# Patient Record
Sex: MALE | Race: WHITE | NOT HISPANIC OR LATINO | Employment: OTHER | URBAN - NONMETROPOLITAN AREA
[De-identification: names, ages, dates, MRNs, and addresses within clinical notes are randomized per-mention and may not be internally consistent; named-entity substitution may affect disease eponyms.]

---

## 2021-02-17 DIAGNOSIS — I10 ESSENTIAL HYPERTENSION, BENIGN: Primary | ICD-10-CM

## 2021-02-17 RX ORDER — CHLORTHALIDONE 25 MG/1
25 TABLET ORAL DAILY
Qty: 90 TABLET | Refills: 3 | Status: SHIPPED | OUTPATIENT
Start: 2021-02-17 | End: 2021-10-18 | Stop reason: SDUPTHER

## 2021-02-17 RX ORDER — HYDRALAZINE HYDROCHLORIDE 50 MG/1
50 TABLET, FILM COATED ORAL 2 TIMES DAILY
Qty: 180 TABLET | Refills: 3 | Status: SHIPPED | OUTPATIENT
Start: 2021-02-17 | End: 2021-04-27 | Stop reason: SDUPTHER

## 2021-04-27 ENCOUNTER — OFFICE VISIT (OUTPATIENT)
Dept: CARDIOLOGY | Facility: CLINIC | Age: 81
End: 2021-04-27

## 2021-04-27 VITALS
BODY MASS INDEX: 30.8 KG/M2 | HEIGHT: 71 IN | RESPIRATION RATE: 12 BRPM | DIASTOLIC BLOOD PRESSURE: 63 MMHG | SYSTOLIC BLOOD PRESSURE: 123 MMHG | TEMPERATURE: 97.8 F | WEIGHT: 220 LBS | OXYGEN SATURATION: 99 % | HEART RATE: 70 BPM

## 2021-04-27 DIAGNOSIS — I25.10 CORONARY ARTERY DISEASE INVOLVING NATIVE CORONARY ARTERY OF NATIVE HEART WITHOUT ANGINA PECTORIS: Primary | ICD-10-CM

## 2021-04-27 DIAGNOSIS — I48.20 ATRIAL FIBRILLATION, CHRONIC (HCC): ICD-10-CM

## 2021-04-27 DIAGNOSIS — E78.5 HYPERLIPIDEMIA LDL GOAL <70: ICD-10-CM

## 2021-04-27 DIAGNOSIS — Z95.0 PRESENCE OF CARDIAC PACEMAKER: ICD-10-CM

## 2021-04-27 DIAGNOSIS — I10 ESSENTIAL HYPERTENSION: ICD-10-CM

## 2021-04-27 PROCEDURE — 99443 PR PHYS/QHP TELEPHONE EVALUATION 21-30 MIN: CPT | Performed by: INTERNAL MEDICINE

## 2021-04-27 RX ORDER — HYDRALAZINE HYDROCHLORIDE 50 MG/1
50 TABLET, FILM COATED ORAL 2 TIMES DAILY
Qty: 180 TABLET | Refills: 3 | Status: SHIPPED | OUTPATIENT
Start: 2021-04-27 | End: 2021-10-18 | Stop reason: SDUPTHER

## 2021-04-27 NOTE — PROGRESS NOTES
[facility] [place]  [facility] CARDIOLOGY  [address] [place] [address]  Dept: [phone]  Dept Fax: [phone]    Reg Guerra  1940    Televisit Note    You have chosen to receive care through a telephone visit. Do you consent to use a telephone visit for your medical care today? Yes    History of Present Illness:  Reg Guerra is a 80 y.o. male who presents via phone for Follow-up. CAD- He denies any chest pain, SOB, no edema, he has CABG in 2008 and has had stress nuclear in 2017 and also 2020 with abnormal reversible defect inferior and lateral,  He is on ASA.,he has stop the Coreg few weeks ago,     The following portions of the patient's history were reviewed and updated as appropriate: allergies, current medications, past family history, past medical history, past social history, past surgical history and problem list.    This visit was scheduled as a phone visit to comply with patient safety concerns in accordance with CDC recommendations.  Time spent in discussion with the patient was 30 minutes.     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 (CMS/HCC)    • Benign prostate hyperplasia    • Bilateral cataracts    • BMI 28.0-28.9,adult    • Chronic atrial fibrillation (CMS/HCC)    • Chronic gout, unspecified, without tophus (tophi)    • Chronic gouty arthritis    • Chronic low back pain    • Diabetes (CMS/HCC)    • DJD (degenerative joint disease)    • Dyslipidemia    • Essential hypertension    • Gastroesophageal reflux disease without  "esophagitis    • Hiatal hernia    • History of rheumatic fever as a child    • Hyperlipidemia    • Knee injury    • Mixed hyperlipidemia    • Obesity    • Peptic ulcer disease    • Presence of cardiac pacemaker        Past Surgical History:   Procedure Laterality Date   • APPENDECTOMY  1957   • CARDIAC PACEMAKER PLACEMENT      VVI   • KIDNEY STONE SURGERY      LITHOTRIPSY   • KNEE SURGERY  1960   • MANDIBLE FRACTURE SURGERY  2009    KICKED BY HORSE   • NISSEN FUNDOPLICATION     • TURP / TRANSURETHRAL INCISION / DRAINAGE PROSTATE     • VENTRAL HERNIA REPAIR         Family History   Problem Relation Age of Onset   • Pancreatic cancer Father    • Heart attack Sister    • Diabetes Sister    • Hypertension Sister         Social History     Socioeconomic History   • Marital status:      Spouse name: Not on file   • Number of children: Not on file   • Years of education: Not on file   • Highest education level: Not on file   Tobacco Use   • Smoking status: Never Smoker   • Smokeless tobacco: Never Used   Substance and Sexual Activity   • Alcohol use: Never   • Drug use: Never   • Sexual activity: Defer       Cardiovascular Procedures    ECHO/MUGA:   STRESS TESTS:   CARDIAC CATH:   DEVICES:   HOLTER:   CT/MRI:   VASCULAR:   CARDIOTHORACIC:     Objective  Vitals:    04/27/21 0940   BP: 123/63  Comment: at home   BP Location: Left arm   Patient Position: Sitting   Cuff Size: Adult   Pulse: 70   Resp: 12   Temp: 97.8 °F (36.6 °C)   TempSrc: Infrared   SpO2: 99%   Weight: 99.8 kg (220 lb)   Height: 180.3 cm (71\")   PainSc: 0-No pain     Body mass index is 30.68 kg/m².    Assessment and Plan  Diagnoses and all orders for this visit:    Coronary artery disease involving native coronary artery of native heart without angina pectoris- No chest pain, prior CABG in 2008 normal Ef, stress nuclear is abnormal     Atrial fibrillation, chronic (CMS/HCC)-rate control, refused blood thinner just ASA    Essential hypertension- The BP " is fine on Amlodipine 5 mg, Hydralazine 50 mg bid and Clorthalidone     Presence of cardiac pacemaker- This seems working fine    Hyperlipidemia LDL goal <70- On Pravastatin         No follow-ups on file.    Fredy Yee MD  04/27/2021

## 2021-04-30 ENCOUNTER — LAB (OUTPATIENT)
Dept: CARDIOLOGY | Facility: CLINIC | Age: 81
End: 2021-04-30

## 2021-04-30 DIAGNOSIS — I10 ESSENTIAL HYPERTENSION: Primary | ICD-10-CM

## 2021-04-30 DIAGNOSIS — E78.5 HYPERLIPIDEMIA LDL GOAL <70: ICD-10-CM

## 2021-10-18 ENCOUNTER — OFFICE VISIT (OUTPATIENT)
Dept: CARDIOLOGY | Facility: CLINIC | Age: 81
End: 2021-10-18

## 2021-10-18 VITALS
HEART RATE: 86 BPM | BODY MASS INDEX: 28.98 KG/M2 | HEIGHT: 71 IN | TEMPERATURE: 97 F | DIASTOLIC BLOOD PRESSURE: 76 MMHG | WEIGHT: 207 LBS | OXYGEN SATURATION: 96 % | RESPIRATION RATE: 12 BRPM | SYSTOLIC BLOOD PRESSURE: 138 MMHG

## 2021-10-18 DIAGNOSIS — I10 ESSENTIAL HYPERTENSION, BENIGN: ICD-10-CM

## 2021-10-18 DIAGNOSIS — I48.20 ATRIAL FIBRILLATION, CHRONIC (HCC): ICD-10-CM

## 2021-10-18 DIAGNOSIS — E78.5 HYPERLIPIDEMIA LDL GOAL <70: ICD-10-CM

## 2021-10-18 DIAGNOSIS — Z95.0 PRESENCE OF CARDIAC PACEMAKER: ICD-10-CM

## 2021-10-18 DIAGNOSIS — I25.10 CORONARY ARTERY DISEASE INVOLVING NATIVE CORONARY ARTERY OF NATIVE HEART WITHOUT ANGINA PECTORIS: Primary | ICD-10-CM

## 2021-10-18 DIAGNOSIS — I10 ESSENTIAL HYPERTENSION: ICD-10-CM

## 2021-10-18 PROCEDURE — 93000 ELECTROCARDIOGRAM COMPLETE: CPT | Performed by: INTERNAL MEDICINE

## 2021-10-18 PROCEDURE — 99214 OFFICE O/P EST MOD 30 MIN: CPT | Performed by: INTERNAL MEDICINE

## 2021-10-18 RX ORDER — PRAVASTATIN SODIUM 40 MG
40 TABLET ORAL DAILY
Qty: 90 TABLET | Refills: 3 | Status: SHIPPED | OUTPATIENT
Start: 2021-10-18 | End: 2022-04-18 | Stop reason: DRUGHIGH

## 2021-10-18 RX ORDER — AMLODIPINE BESYLATE 5 MG/1
5 TABLET ORAL DAILY
Qty: 90 TABLET | Refills: 3 | Status: SHIPPED | OUTPATIENT
Start: 2021-10-18 | End: 2022-04-18 | Stop reason: SDUPTHER

## 2021-10-18 RX ORDER — CHLORTHALIDONE 25 MG/1
25 TABLET ORAL DAILY
Qty: 90 TABLET | Refills: 3 | Status: SHIPPED | OUTPATIENT
Start: 2021-10-18 | End: 2022-04-18 | Stop reason: SDUPTHER

## 2021-10-18 RX ORDER — HYDRALAZINE HYDROCHLORIDE 50 MG/1
50 TABLET, FILM COATED ORAL 2 TIMES DAILY
Qty: 180 TABLET | Refills: 3 | Status: SHIPPED | OUTPATIENT
Start: 2021-10-18 | End: 2022-04-18 | Stop reason: SDUPTHER

## 2021-10-18 NOTE — PROGRESS NOTES
[facility] [place]  [facility] CARDIOLOGY  [address] [place] [address]  Dept: [phone]  Dept Fax: [phone]    Reg Guerra  1940    Follow Up Office Visit Note    History of Present Illness:  Reg Guerra is a 81 y.o. male who presents to the clinic for Follow-up.  CADD_ He denies any chest pain, SOB, edema, he has CABG in 2008 and his Ef was normal, last stress nuclear was 2020, with reversible inferolateral defect, and normal EF on ASA, Coreg 3125 bid,    The following portions of the patient's history were reviewed and updated as appropriate: allergies, current medications, past family history, past medical history, past social history, past surgical history and problem list.    Medications:  allopurinol  amLODIPine  cetirizine  chlorthalidone  finasteride  fluticasone  hydrALAZINE  levothyroxine  nitroglycerin  omeprazole  pravastatin  sucralfate  tamsulosin capsule  TiZANidine  traMADol    Subjective  No Known Allergies     Past Medical History:   Diagnosis Date   • Acquired hypothyroidism    • Allergic rhinitis due to pollen    • Appendicitis    • Atherosclerotic heart disease of native coronary artery with other forms of angina pectoris (HCC)    • Benign prostate hyperplasia    • Bilateral cataracts    • BMI 28.0-28.9,adult    • Chronic atrial fibrillation (HCC)    • Chronic gout, unspecified, without tophus (tophi)    • Chronic gouty arthritis    • Chronic low back pain    • Diabetes (HCC)    • DJD (degenerative joint disease)    • Dyslipidemia    • Essential hypertension    • Gastroesophageal reflux disease without esophagitis    • Hiatal hernia    • History of rheumatic fever as a child    • Hyperlipidemia    • Knee injury    • Mixed hyperlipidemia    • Obesity    • Peptic ulcer disease    • Presence of cardiac pacemaker        Past Surgical History:   Procedure Laterality Date   • APPENDECTOMY  1957   • CARDIAC PACEMAKER PLACEMENT      VVI   • KIDNEY STONE  "SURGERY      LITHOTRIPSY   • KNEE SURGERY  1960   • MANDIBLE FRACTURE SURGERY  2009    KICKED BY HORSE   • NISSEN FUNDOPLICATION     • TURP / TRANSURETHRAL INCISION / DRAINAGE PROSTATE     • VENTRAL HERNIA REPAIR         Family History   Problem Relation Age of Onset   • Pancreatic cancer Father    • Heart attack Sister    • Diabetes Sister    • Hypertension Sister         Social History     Socioeconomic History   • Marital status:    Tobacco Use   • Smoking status: Never Smoker   • Smokeless tobacco: Never Used   Vaping Use   • Vaping Use: Never used   Substance and Sexual Activity   • Alcohol use: Never   • Drug use: Never   • Sexual activity: Defer       Review of Systems   Constitutional: Negative.    HENT: Negative.    Respiratory: Negative.    Cardiovascular: Negative.    Endocrine: Negative.    Genitourinary: Negative.    Musculoskeletal: Negative.    Skin: Negative.    Allergic/Immunologic: Negative.    Neurological: Negative.    Hematological: Negative.    Psychiatric/Behavioral: Negative.    All other systems reviewed and are negative.      Cardiovascular Procedures    ECHO/MUGA:   STRESS TESTS:   CARDIAC CATH:   DEVICES:   HOLTER:   CT/MRI:   VASCULAR:   CARDIOTHORACIC:     Objective  Vitals:    10/18/21 1324   BP: 138/76   BP Location: Left arm   Patient Position: Sitting   Cuff Size: Adult   Pulse: 86   Resp: 12   Temp: 97 °F (36.1 °C)   TempSrc: Infrared   SpO2: 96%   Weight: 93.9 kg (207 lb)   Height: 180.3 cm (71\")   PainSc: 0-No pain     Body mass index is 28.87 kg/m².     Physical Exam  Constitutional:       Appearance: Healthy appearance. Not in distress.   Neck:      Vascular: No JVR. JVD normal.   Pulmonary:      Effort: Pulmonary effort is normal.      Breath sounds: Normal breath sounds. No wheezing. No rhonchi. No rales.   Chest:      Chest wall: Not tender to palpatation.   Cardiovascular:      PMI at left midclavicular line. Normal rate. Irregularly irregular rhythm. Normal S1. " Normal S2.      Murmurs: There is no murmur.      No gallop. No click. No rub.   Pulses:     Intact distal pulses.   Edema:     Peripheral edema absent.   Abdominal:      General: Bowel sounds are normal.      Palpations: Abdomen is soft.      Tenderness: There is no abdominal tenderness.   Musculoskeletal: Normal range of motion.         General: No tenderness. Skin:     General: Skin is warm and dry.   Neurological:      General: No focal deficit present.      Mental Status: Alert and oriented to person, place and time.          Diagnostic Data    ECG 12 Lead    Date/Time: 10/18/2021 2:13 PM  Performed by: Fredy Yee MD  Authorized by: Fredy Yee MD   Previous ECG: no previous ECG available  Rhythm: sinus rhythm and paced  Rate: normal  BPM: 70  Conduction: left bundle branch block  QRS axis: left    Clinical impression: abnormal EKG            Assessment and Plan  Diagnoses and all orders for this visit:    Coronary artery disease involving native coronary artery of native heart without angina pectoris- On ASA, No chest pain, doing good, bypass 2008    Atrial fibrillation, chronic (HCC)= rate control on Coreg, he is s/p pacer    Essential hypertension- BP is good 130/.60  On Coreg 3,125 bid, Amlodipine 5 mg, Hydralazine 50 mg bid     Hyperlipidemia LDL goal <70- On Pravastatin    Presence of cardiac pacemaker- This seems working fine         No follow-ups on file.    Fredy Yee MD  10/18/2021

## 2022-04-08 RX ORDER — ALLOPURINOL 100 MG/1
100 TABLET ORAL 2 TIMES DAILY
Qty: 180 TABLET | Refills: 0 | Status: SHIPPED | OUTPATIENT
Start: 2022-04-08 | End: 2022-07-18 | Stop reason: SDUPTHER

## 2022-04-08 RX ORDER — SUCRALFATE 1 G/1
1 TABLET ORAL
Qty: 360 TABLET | Refills: 0 | Status: SHIPPED | OUTPATIENT
Start: 2022-04-08 | End: 2022-04-22 | Stop reason: SDUPTHER

## 2022-04-18 ENCOUNTER — OFFICE VISIT (OUTPATIENT)
Dept: CARDIOLOGY | Facility: CLINIC | Age: 82
End: 2022-04-18

## 2022-04-18 VITALS
OXYGEN SATURATION: 99 % | WEIGHT: 200 LBS | BODY MASS INDEX: 28 KG/M2 | HEIGHT: 71 IN | SYSTOLIC BLOOD PRESSURE: 128 MMHG | RESPIRATION RATE: 18 BRPM | DIASTOLIC BLOOD PRESSURE: 74 MMHG | TEMPERATURE: 98 F | HEART RATE: 84 BPM

## 2022-04-18 DIAGNOSIS — Z95.0 PRESENCE OF CARDIAC PACEMAKER: ICD-10-CM

## 2022-04-18 DIAGNOSIS — I10 ESSENTIAL HYPERTENSION, BENIGN: ICD-10-CM

## 2022-04-18 DIAGNOSIS — I48.20 ATRIAL FIBRILLATION, CHRONIC: ICD-10-CM

## 2022-04-18 DIAGNOSIS — I25.10 CORONARY ARTERY DISEASE INVOLVING NATIVE CORONARY ARTERY OF NATIVE HEART WITHOUT ANGINA PECTORIS: Primary | ICD-10-CM

## 2022-04-18 DIAGNOSIS — I10 ESSENTIAL HYPERTENSION: ICD-10-CM

## 2022-04-18 DIAGNOSIS — E78.5 HYPERLIPIDEMIA LDL GOAL <70: ICD-10-CM

## 2022-04-18 PROCEDURE — 99214 OFFICE O/P EST MOD 30 MIN: CPT | Performed by: INTERNAL MEDICINE

## 2022-04-18 RX ORDER — AMLODIPINE BESYLATE 5 MG/1
5 TABLET ORAL DAILY
Qty: 90 TABLET | Refills: 3 | Status: SHIPPED | OUTPATIENT
Start: 2022-04-18 | End: 2022-10-19 | Stop reason: SDUPTHER

## 2022-04-18 RX ORDER — ASPIRIN 81 MG/1
81 TABLET ORAL DAILY
Qty: 90 TABLET | Refills: 3 | Status: SHIPPED | OUTPATIENT
Start: 2022-04-18

## 2022-04-18 RX ORDER — HYDRALAZINE HYDROCHLORIDE 50 MG/1
50 TABLET, FILM COATED ORAL 2 TIMES DAILY
Qty: 180 TABLET | Refills: 3 | Status: SHIPPED | OUTPATIENT
Start: 2022-04-18 | End: 2022-10-19 | Stop reason: SDUPTHER

## 2022-04-18 RX ORDER — ROSUVASTATIN CALCIUM 40 MG/1
40 TABLET, COATED ORAL DAILY
Qty: 90 TABLET | Refills: 3 | Status: SHIPPED | OUTPATIENT
Start: 2022-04-18 | End: 2022-10-19 | Stop reason: SDUPTHER

## 2022-04-18 RX ORDER — CHLORTHALIDONE 25 MG/1
25 TABLET ORAL DAILY
Qty: 90 TABLET | Refills: 3 | Status: SHIPPED | OUTPATIENT
Start: 2022-04-18 | End: 2022-10-19 | Stop reason: SDUPTHER

## 2022-04-18 NOTE — PROGRESS NOTES
[facility] [place]  [facility] CARDIOLOGY  [address] [place] [address]  Dept: [phone]  Dept Fax: [phone]    Reg Guerra  1940    Follow Up Office Visit Note    History of Present Illness:  Reg Guerra is a 81 y.o. male who presents to the clinic for Follow-up. Coronary artery disease- He is s/p CABG in 2008 , denies any chest pain, SOB, his Ef is normal, , on ASA, stress nuclear in 2016 mild lateral reversible defect, normal EF., will keep medical treatment    The following portions of the patient's history were reviewed and updated as appropriate: allergies, current medications, past family history, past medical history, past social history, past surgical history and problem list.    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 (HCC)    • Benign prostate hyperplasia    • Bilateral cataracts    • BMI 28.0-28.9,adult    • Chronic atrial fibrillation (HCC)    • Chronic gout, unspecified, without tophus (tophi)    • Chronic gouty arthritis    • Chronic low back pain    • Diabetes (HCC)    • DJD (degenerative joint disease)    • Dyslipidemia    • Essential hypertension    • Gastroesophageal reflux disease without esophagitis    • Hiatal hernia    • History of rheumatic fever as a child    • Hyperlipidemia    • Knee injury    • Mixed hyperlipidemia    • Obesity    • Peptic ulcer disease    • Presence of cardiac pacemaker        Past Surgical History:   Procedure Laterality Date   • APPENDECTOMY  1957   • CARDIAC PACEMAKER PLACEMENT      VVI   •  "KIDNEY STONE SURGERY      LITHOTRIPSY   • KNEE SURGERY  1960   • MANDIBLE FRACTURE SURGERY  2009    KICKED BY HORSE   • NISSEN FUNDOPLICATION     • TURP / TRANSURETHRAL INCISION / DRAINAGE PROSTATE     • VENTRAL HERNIA REPAIR         Family History   Problem Relation Age of Onset   • Pancreatic cancer Father    • Heart attack Sister    • Diabetes Sister    • Hypertension Sister         Social History     Socioeconomic History   • Marital status:    Tobacco Use   • Smoking status: Never Smoker   • Smokeless tobacco: Never Used   Vaping Use   • Vaping Use: Never used   Substance and Sexual Activity   • Alcohol use: Never   • Drug use: Never   • Sexual activity: Defer       Review of Systems   Constitutional: Negative.    HENT: Negative.    Respiratory: Negative.    Cardiovascular: Negative.    Endocrine: Negative.    Genitourinary: Negative.    Musculoskeletal: Negative.    Skin: Negative.    Allergic/Immunologic: Negative.    Neurological: Negative.    Hematological: Negative.    Psychiatric/Behavioral: Negative.        Cardiovascular Procedures    ECHO/MUGA:   STRESS TESTS:   CARDIAC CATH:   DEVICES:   HOLTER:   CT/MRI:   VASCULAR:   CARDIOTHORACIC:     Objective  Vitals:    04/18/22 0953   BP: 128/74   BP Location: Left arm   Patient Position: Sitting   Cuff Size: Adult   Pulse: 84   Resp: 18   Temp: 98 °F (36.7 °C)   TempSrc: Infrared   SpO2: 99%   Weight: 90.7 kg (200 lb)   Height: 180.3 cm (71\")   PainSc: 0-No pain     Body mass index is 27.89 kg/m².     Physical Exam  Constitutional:       Appearance: Healthy appearance. Not in distress.   Neck:      Vascular: No JVR. JVD normal.   Pulmonary:      Effort: Pulmonary effort is normal.      Breath sounds: Normal breath sounds. No wheezing. No rhonchi. No rales.   Chest:      Chest wall: Not tender to palpatation.   Cardiovascular:      PMI at left midclavicular line. Normal rate. Regular rhythm. Normal S1. Normal S2.      Murmurs: There is no murmur.      " No gallop. No click. No rub.   Pulses:     Intact distal pulses.   Edema:     Peripheral edema absent.   Abdominal:      General: Bowel sounds are normal.      Palpations: Abdomen is soft.      Tenderness: There is no abdominal tenderness.   Musculoskeletal: Normal range of motion.         General: No tenderness. Skin:     General: Skin is warm and dry.   Neurological:      General: No focal deficit present.      Mental Status: Alert and oriented to person, place and time.          Diagnostic Data  Procedures    Assessment and Plan  Diagnoses and all orders for this visit:    Coronary artery disease involving native coronary artery of native heart without angina pectoris-No chest pain, prior CABG 2008 and stress nuclear 2016 small reversible lateral defect, on Asa Ef 55%    Atrial fibrillation, chronic (HCC)- rate control on Coreg 3,125 bid, has had nose bleeding and does not want to take any blood thinner, we also talk about watchman device, he is not interested now , advised risk for stroke is high, he only takes ASa    Essential hypertension- The BP is 140.80 on Amlodipine 5 mg, hydralazine 50 mg bid, Coreg 3,125 bid and Chlorthalidone 25 mg   -     hydrALAZINE (APRESOLINE) 50 MG tablet; Take 1 tablet by mouth 2 (Two) Times a Day.    Hyperlipidemia LDL goal <70 on Pravastatin, LDl is over 80, will change to Crestor 40 mg    Presence of cardiac pacemaker- This seems working wellm        Other orders  -     rosuvastatin (CRESTOR) 40 MG tablet; Take 1 tablet by mouth Daily.  -     aspirin (aspirin) 81 MG EC tablet; Take 1 tablet by mouth Daily.  -     amLODIPine (NORVASC) 5 MG tablet; Take 1 tablet by mouth Daily.         Return in about 6 months (around 10/18/2022) for Recheck.    Fredy Yee MD  04/18/2022

## 2022-04-21 ENCOUNTER — TELEPHONE (OUTPATIENT)
Dept: FAMILY MEDICINE CLINIC | Facility: CLINIC | Age: 82
End: 2022-04-21

## 2022-04-21 NOTE — TELEPHONE ENCOUNTER
There was a request for his sucralfate from pharmacy- last documentation in [employer] was he takes it twice a day- please see if that is correct and if he needs refill-- thanks

## 2022-04-22 RX ORDER — SUCRALFATE 1 G/1
TABLET ORAL
Qty: 270 TABLET | Refills: 1 | Status: SHIPPED | OUTPATIENT
Start: 2022-04-22 | End: 2022-09-06

## 2022-05-23 RX ORDER — ALLOPURINOL 100 MG/1
TABLET ORAL
Qty: 180 TABLET | Refills: 3 | OUTPATIENT
Start: 2022-05-23

## 2022-06-16 RX ORDER — OMEPRAZOLE 40 MG/1
CAPSULE, DELAYED RELEASE ORAL
Qty: 90 CAPSULE | Refills: 3 | OUTPATIENT
Start: 2022-06-16

## 2022-07-05 ENCOUNTER — OFFICE VISIT (OUTPATIENT)
Dept: FAMILY MEDICINE CLINIC | Facility: CLINIC | Age: 82
End: 2022-07-05

## 2022-07-05 VITALS
WEIGHT: 202 LBS | HEART RATE: 67 BPM | HEIGHT: 71 IN | SYSTOLIC BLOOD PRESSURE: 104 MMHG | OXYGEN SATURATION: 92 % | BODY MASS INDEX: 28.28 KG/M2 | DIASTOLIC BLOOD PRESSURE: 56 MMHG

## 2022-07-05 DIAGNOSIS — E03.9 PRIMARY HYPOTHYROIDISM: Primary | ICD-10-CM

## 2022-07-05 DIAGNOSIS — M1A.9XX0 CHRONIC GOUT WITHOUT TOPHUS, UNSPECIFIED CAUSE, UNSPECIFIED SITE: ICD-10-CM

## 2022-07-05 DIAGNOSIS — I25.10 CORONARY ARTERY DISEASE INVOLVING NATIVE CORONARY ARTERY OF NATIVE HEART WITHOUT ANGINA PECTORIS: ICD-10-CM

## 2022-07-05 DIAGNOSIS — E78.5 HYPERLIPIDEMIA LDL GOAL <70: ICD-10-CM

## 2022-07-05 DIAGNOSIS — G89.29 CHRONIC PAIN OF RIGHT KNEE: ICD-10-CM

## 2022-07-05 DIAGNOSIS — I10 ESSENTIAL HYPERTENSION: ICD-10-CM

## 2022-07-05 DIAGNOSIS — Z00.00 ENCOUNTER FOR SUBSEQUENT ANNUAL WELLNESS VISIT (AWV) IN MEDICARE PATIENT: ICD-10-CM

## 2022-07-05 DIAGNOSIS — K21.9 GASTROESOPHAGEAL REFLUX DISEASE, UNSPECIFIED WHETHER ESOPHAGITIS PRESENT: ICD-10-CM

## 2022-07-05 DIAGNOSIS — I48.20 ATRIAL FIBRILLATION, CHRONIC: ICD-10-CM

## 2022-07-05 DIAGNOSIS — I10 PRIMARY HYPERTENSION: ICD-10-CM

## 2022-07-05 DIAGNOSIS — Z95.0 PRESENCE OF CARDIAC PACEMAKER: ICD-10-CM

## 2022-07-05 DIAGNOSIS — M25.561 CHRONIC PAIN OF RIGHT KNEE: ICD-10-CM

## 2022-07-05 DIAGNOSIS — E78.2 MIXED HYPERLIPIDEMIA: ICD-10-CM

## 2022-07-05 PROCEDURE — G0439 PPPS, SUBSEQ VISIT: HCPCS | Performed by: PHYSICIAN ASSISTANT

## 2022-07-05 PROCEDURE — 1170F FXNL STATUS ASSESSED: CPT | Performed by: PHYSICIAN ASSISTANT

## 2022-07-05 PROCEDURE — 1125F AMNT PAIN NOTED PAIN PRSNT: CPT | Performed by: PHYSICIAN ASSISTANT

## 2022-07-05 PROCEDURE — 1159F MED LIST DOCD IN RCRD: CPT | Performed by: PHYSICIAN ASSISTANT

## 2022-07-05 PROCEDURE — 99214 OFFICE O/P EST MOD 30 MIN: CPT | Performed by: PHYSICIAN ASSISTANT

## 2022-07-05 RX ORDER — LEVOTHYROXINE SODIUM 88 UG/1
88 TABLET ORAL DAILY
Qty: 90 TABLET | Refills: 1 | Status: SHIPPED | OUTPATIENT
Start: 2022-07-05 | End: 2022-12-12 | Stop reason: SDUPTHER

## 2022-07-05 RX ORDER — OMEPRAZOLE 40 MG/1
40 CAPSULE, DELAYED RELEASE ORAL DAILY
Qty: 90 CAPSULE | Refills: 1 | Status: SHIPPED | OUTPATIENT
Start: 2022-07-05 | End: 2023-02-14 | Stop reason: SDUPTHER

## 2022-07-05 NOTE — PROGRESS NOTES
QUICK REFERENCE INFORMATION:  The ABCs of the Annual Wellness Visit    Subsequent Medicare Wellness Visit      HEALTH RISK ASSESSMENT    1940    Recent Hospitalizations:  No hospitalization(s) within the last year..    Current Medical Providers:  Patient Care Team:  Gloria Siegel PA-C as PCP - General (Physician Assistant)  Fredy Yee MD as Consulting Physician (Cardiology)    Smoking Status:  Social History     Tobacco Use   Smoking Status Never Smoker   Smokeless Tobacco Never Used       Alcohol Consumption:  Social History     Substance and Sexual Activity   Alcohol Use Never       Depression Screen:   PHQ-2/PHQ-9 Depression Screening 7/5/2022   Little Interest or Pleasure in Doing Things 0-->not at all   Feeling Down, Depressed or Hopeless 0-->not at all   PHQ-9: Brief Depression Severity Measure Score 0       Health Habits and Functional and Cognitive Screening:  Functional & Cognitive Status 7/5/2022   Do you have difficulty preparing food and eating? No   Do you have difficulty bathing yourself, getting dressed or grooming yourself? No   Do you have difficulty using the toilet? No   Do you have difficulty moving around from place to place? No   Do you have trouble with steps or getting out of a bed or a chair? No   Current Diet Well Balanced Diet   Dental Exam Up to date   Eye Exam Not up to date   Exercise (times per week) 5 times per week   Current Exercises Include Yard Work   Do you need help using the phone?  No   Are you deaf or do you have serious difficulty hearing?  No   Do you need help with transportation? No   Do you need help shopping? No   Do you need help preparing meals?  No   Do you need help with housework?  No   Do you need help with laundry? No   Do you need help taking your medications? No   Do you need help managing money? No   Do you ever drive or ride in a car without wearing a seat belt? No   Have you felt unusual stress, anger or loneliness in the last month? No    Who do you live with? Spouse   If you need help, do you have trouble finding someone available to you? No   Have you been bothered in the last four weeks by sexual problems? No   Do you have difficulty concentrating, remembering or making decisions? No       Fall Risk Screen:  COLEMAN Fall Risk Assessment was completed, and patient is at LOW risk for falls.Assessment completed on:7/5/2022    ACE III MINI        Does the patient have evidence of cognitive impairment? No    Opioid medication/s are on active medication list.  and I have evaluated his active treatment plan and pain score trends (see table).  Vitals:    07/05/22 1044   PainSc:   8   PainLoc: Comment: right knee     I have reviewed the chart for potential of high risk medication and harmful drug interactions in the elderly.            Aspirin use counseling: Taking ASA appropriately as indicated    Recent Lab Results:  CMP:     HbA1c:  No results found for: HGBA1C  Microalbumin:  No results found for: MICROALBUR, POCMALB, POCCREAT  Lipid Panel  No results found for: CHOL, TRIG, HDL, LDL, AST, ALT    Visual Acuity:  No exam data present    Age-appropriate Screening Schedule:  Refer to the list below for future screening recommendations based on patient's age, sex and/or medical conditions. Orders for these recommended tests are listed in the plan section. The patient has been provided with a written plan.    Health Maintenance   Topic Date Due   • ZOSTER VACCINE (1 of 2) 07/05/2022 (Originally 6/6/1991)   • TDAP/TD VACCINES (1 - Tdap) 10/19/2022 (Originally [birth year]/6/1960)   • INFLUENZA VACCINE  10/01/2022   • LIPID PANEL  02/03/2023        Giovanna Guerra is a 82 y.o. male who presents for a Subsequent Wellness Visit.    The following portions of the patient's history were reviewed and updated as appropriate: allergies, current medications, past family history, past medical history, past social history, past surgical history and problem  list.    Outpatient Medications Prior to Visit   Medication Sig Dispense Refill   • allopurinol (ZYLOPRIM) 100 MG tablet Take 1 tablet by mouth 2 (Two) Times a Day. 180 tablet 0   • amLODIPine (NORVASC) 5 MG tablet Take 1 tablet by mouth Daily. 90 tablet 3   • aspirin (aspirin) 81 MG EC tablet Take 1 tablet by mouth Daily. 90 tablet 3   • cetirizine (zyrTEC) 10 MG tablet Take 10 mg by mouth Daily.     • chlorthalidone (HYGROTON) 25 MG tablet Take 1 tablet by mouth Daily. 90 tablet 3   • finasteride (PROSCAR) 5 MG tablet Take 5 mg by mouth Daily.     • fluticasone (FLONASE) 50 MCG/ACT nasal spray 1-2 sprays into the nostril(s) as directed by provider Daily.     • hydrALAZINE (APRESOLINE) 50 MG tablet Take 1 tablet by mouth 2 (Two) Times a Day. 180 tablet 3   • nitroglycerin (NITROSTAT) 0.4 MG SL tablet Place 0.4 mg under the tongue Every 5 (Five) Minutes As Needed for Chest Pain. Take no more than 3 doses in 15 minutes.     • rosuvastatin (CRESTOR) 40 MG tablet Take 1 tablet by mouth Daily. 90 tablet 3   • sucralfate (Carafate) 1 g tablet Take 1 tablet by mouth three time a day on an empty stomach,  1 hour before meals 270 tablet 1   • tamsulosin (FLOMAX) 0.4 MG capsule 24 hr capsule Take 1 capsule by mouth Daily. 1/2 HOUR FOLLOWING THE SAME MEAL EACH DAY     • TiZANidine (ZANAFLEX) 2 MG capsule Take 2 mg by mouth Every 8 (Eight) Hours As Needed for Muscle Spasms.     • traMADol (ULTRAM) 50 MG tablet Take 50 mg by mouth Every 6 (Six) Hours As Needed for Moderate Pain .     • levothyroxine (SYNTHROID, LEVOTHROID) 88 MCG tablet Take 88 mcg by mouth Daily.     • omeprazole (priLOSEC) 40 MG capsule Take 40 mg by mouth Daily. Before a meal       No facility-administered medications prior to visit.       Patient Active Problem List   Diagnosis   • Coronary artery disease involving native coronary artery of native heart   • Atrial fibrillation, chronic (HCC)   • Primary hypertension   • Mixed hyperlipidemia   • Presence of  "cardiac pacemaker   • Encounter for subsequent annual wellness visit (AWV) in Medicare patient   • Gastroesophageal reflux disease   • Chronic pain of right knee   • Primary hypothyroidism   • Chronic gout without tophus       Advance Care Planning:  ACP discussion was held with the patient during this visit. Patient has an advance directive (not in EMR), copy requested.    Identification of Risk Factors:  Risk factors include: Advance Directive Discussion  Cardiovascular risk  Chronic Pain   Colon Cancer Screening  Immunizations Discussed/Encouraged (specific immunizations; Td, Tdap and Shingrix )  Obesity/Overweight .    Compared to one year ago, the patient feels his physical health is the same.  Compared to one year ago, the patient feels his mental health is the same.    Objective       Vitals:    07/05/22 1044   BP: 104/56   Pulse: 67   SpO2: 92%   Weight: 91.6 kg (202 lb)   Height: 180.3 cm (71\")   PainSc:   8   PainLoc: Comment: right knee     BMI Readings from Last 1 Encounters:   07/05/22 28.17 kg/m²   BMI is above normal parameters. Recommendations include: educational material, exercise counseling and nutrition counseling      Assessment & Plan   Problem List Items Addressed This Visit        Cardiac and Vasculature    Coronary artery disease involving native coronary artery of native heart    Current Assessment & Plan     Continue follow up with cardiology           Relevant Medications    nitroglycerin (NITROSTAT) 0.4 MG SL tablet    amLODIPine (NORVASC) 5 MG tablet    Atrial fibrillation, chronic (HCC)    Current Assessment & Plan     Continue follow up with cardiology           Relevant Medications    nitroglycerin (NITROSTAT) 0.4 MG SL tablet    amLODIPine (NORVASC) 5 MG tablet    Primary hypertension    Current Assessment & Plan     Continue follow up with cardiology           Relevant Medications    hydrALAZINE (APRESOLINE) 50 MG tablet    amLODIPine (NORVASC) 5 MG tablet    chlorthalidone " (HYGROTON) 25 MG tablet    Other Relevant Orders    CBC & Differential    TSH    Mixed hyperlipidemia    Current Assessment & Plan     Continue follow up with cardiology           Relevant Medications    rosuvastatin (CRESTOR) 40 MG tablet    Other Relevant Orders    Comprehensive Metabolic Panel    Lipid Panel    Presence of cardiac pacemaker    Current Assessment & Plan     Continue follow up with cardiology              Endocrine and Metabolic    Primary hypothyroidism - Primary    Relevant Medications    levothyroxine (SYNTHROID, LEVOTHROID) 88 MCG tablet       Gastrointestinal Abdominal     Gastroesophageal reflux disease    Relevant Medications    sucralfate (Carafate) 1 g tablet    omeprazole (priLOSEC) 40 MG capsule       Musculoskeletal and Injuries    Chronic pain of right knee    Relevant Orders    XR Knee 3 View Right    Chronic gout without tophus    Relevant Orders    Uric Acid       Other    Encounter for subsequent annual wellness visit (AWV) in Medicare patient    Current Assessment & Plan     Updated annual wellness visit checklist.  Immunizations discussed. Patient declined Shingles vaccine. Patient to follow up with pharmacy about Tdap. Patient is past routine screening age. No complaints of change in bowel patterns or rectal bleeding.  Recommend yearly dental and eye exams. Also discussed monitoring of blood pressure and lipids. We addressed patient self-assessment of health status, frailty, and physical functioning. We reviewed psychosocial risks, behavioral risks, instrumental activities of daily living, and patient health risk assessment. Patient was given a personalized prevention plan.                  Patient Self-Management and Personalized Health Advice  The patient has been provided with information about: diet, exercise and prevention of cardiac or vascular disease and preventive services including:   · Annual Wellness Visit (AWV)  · Depression Screening (15 minutes face to face, Code  ).    Outpatient Encounter Medications as of 7/5/2022   Medication Sig Dispense Refill   • allopurinol (ZYLOPRIM) 100 MG tablet Take 1 tablet by mouth 2 (Two) Times a Day. 180 tablet 0   • amLODIPine (NORVASC) 5 MG tablet Take 1 tablet by mouth Daily. 90 tablet 3   • aspirin (aspirin) 81 MG EC tablet Take 1 tablet by mouth Daily. 90 tablet 3   • cetirizine (zyrTEC) 10 MG tablet Take 10 mg by mouth Daily.     • chlorthalidone (HYGROTON) 25 MG tablet Take 1 tablet by mouth Daily. 90 tablet 3   • finasteride (PROSCAR) 5 MG tablet Take 5 mg by mouth Daily.     • fluticasone (FLONASE) 50 MCG/ACT nasal spray 1-2 sprays into the nostril(s) as directed by provider Daily.     • hydrALAZINE (APRESOLINE) 50 MG tablet Take 1 tablet by mouth 2 (Two) Times a Day. 180 tablet 3   • levothyroxine (SYNTHROID, LEVOTHROID) 88 MCG tablet Take 1 tablet by mouth Daily. 90 tablet 1   • nitroglycerin (NITROSTAT) 0.4 MG SL tablet Place 0.4 mg under the tongue Every 5 (Five) Minutes As Needed for Chest Pain. Take no more than 3 doses in 15 minutes.     • omeprazole (priLOSEC) 40 MG capsule Take 1 capsule by mouth Daily. Before a meal 90 capsule 1   • rosuvastatin (CRESTOR) 40 MG tablet Take 1 tablet by mouth Daily. 90 tablet 3   • sucralfate (Carafate) 1 g tablet Take 1 tablet by mouth three time a day on an empty stomach,  1 hour before meals 270 tablet 1   • tamsulosin (FLOMAX) 0.4 MG capsule 24 hr capsule Take 1 capsule by mouth Daily. 1/2 HOUR FOLLOWING THE SAME MEAL EACH DAY     • TiZANidine (ZANAFLEX) 2 MG capsule Take 2 mg by mouth Every 8 (Eight) Hours As Needed for Muscle Spasms.     • traMADol (ULTRAM) 50 MG tablet Take 50 mg by mouth Every 6 (Six) Hours As Needed for Moderate Pain .     • [DISCONTINUED] levothyroxine (SYNTHROID, LEVOTHROID) 88 MCG tablet Take 88 mcg by mouth Daily.     • [DISCONTINUED] omeprazole (priLOSEC) 40 MG capsule Take 40 mg by mouth Daily. Before a meal       No facility-administered encounter  medications on file as of 7/5/2022.       Follow Up:  Return in about 1 year (around 7/5/2023) for Medicare Wellness.     There are no Patient Instructions on file for this visit.    An After Visit Summary and PPPS with all of these plans were given to the patient.

## 2022-07-05 NOTE — ASSESSMENT & PLAN NOTE
Updated annual wellness visit checklist.  Immunizations discussed. Patient declined Shingles vaccine. Patient to follow up with pharmacy about Tdap. Patient is past routine screening age. No complaints of change in bowel patterns or rectal bleeding.  Recommend yearly dental and eye exams. Also discussed monitoring of blood pressure and lipids. We addressed patient self-assessment of health status, frailty, and physical functioning. We reviewed psychosocial risks, behavioral risks, instrumental activities of daily living, and patient health risk assessment. Patient was given a personalized prevention plan.

## 2022-07-05 NOTE — PROGRESS NOTES
"Chief Complaint  Med Refill, Knee Pain (Right knee pain), Sinusitis, and Medicare Wellness-subsequent    Subjective          Reg Guerra presents to [facility]  Patient in today for mediation recheck and refills.  Will rtc for labs this week. Sees cardiology for other medications. States has been feeling well. Has had some right knee pain x 1+ month- denies any injury to area.  Had total knee replacement around 5 yrs ago. Denies any heat/swelling to knee. Also has had daily nasal congestion x 5-6 months- states if doesn't use nasal decongestant keeps congestion. Denies fever. Denies sinus pressure.     Knee Pain     Heartburn  He reports no abdominal pain, no chest pain, no coughing, no nausea or no sore throat. Pertinent negatives include no fatigue.   Hypothyroidism  This is a chronic problem. Associated symptoms include arthralgias and congestion. Pertinent negatives include no abdominal pain, chest pain, coughing, fatigue, fever, nausea, sore throat or vomiting.       Objective   Vital Signs:   /56   Pulse 67   Ht 180.3 cm (71\")   Wt 91.6 kg (202 lb)   SpO2 92%   BMI 28.17 kg/m²     Body mass index is 28.17 kg/m².    Review of Systems   Constitutional: Negative for fatigue and fever.   HENT: Positive for congestion. Negative for sore throat.    Respiratory: Negative for cough and shortness of breath.    Cardiovascular: Negative for chest pain, palpitations and leg swelling.   Gastrointestinal: Negative for abdominal pain, diarrhea, nausea and vomiting.   Genitourinary: Negative for dysuria and frequency.   Musculoskeletal: Positive for arthralgias.   Neurological: Negative for dizziness and headache.       Past History:  Medical History: has a past medical history of Acquired hypothyroidism, Allergic rhinitis due to pollen, Appendicitis, Atherosclerotic heart disease of native coronary artery with other forms of angina pectoris (HCC), Benign prostate hyperplasia, " Bilateral cataracts, BMI 28.0-28.9,adult, Chronic atrial fibrillation (HCC), Chronic gout, unspecified, without tophus (tophi), Chronic gouty arthritis, Chronic low back pain, Diabetes (HCC), DJD (degenerative joint disease), Dyslipidemia, Essential hypertension, Gastroesophageal reflux disease without esophagitis, Hiatal hernia, History of rheumatic fever as a child, Hyperlipidemia, Knee injury, Mixed hyperlipidemia, Obesity, Peptic ulcer disease, and Presence of cardiac pacemaker.   Surgical History: has a past surgical history that includes Knee surgery (1960); Appendectomy (1957); Ventral hernia repair; Kidney stone surgery; Cardiac pacemaker placement; Nissen fundoplication; TURP / transurethral incision / drainage prostate; Mandible fracture surgery (2009); and Bypass Graft.   Family History: family history includes Diabetes in his sister; Heart attack in his sister; Hypertension in his sister; Pancreatic cancer in his father.   Social History: reports that he has never smoked. He has never used smokeless tobacco. He reports that he does not drink alcohol and does not use drugs.      Current Outpatient Medications:   •  allopurinol (ZYLOPRIM) 100 MG tablet, Take 1 tablet by mouth 2 (Two) Times a Day., Disp: 180 tablet, Rfl: 0  •  amLODIPine (NORVASC) 5 MG tablet, Take 1 tablet by mouth Daily., Disp: 90 tablet, Rfl: 3  •  aspirin (aspirin) 81 MG EC tablet, Take 1 tablet by mouth Daily., Disp: 90 tablet, Rfl: 3  •  cetirizine (zyrTEC) 10 MG tablet, Take 10 mg by mouth Daily., Disp: , Rfl:   •  chlorthalidone (HYGROTON) 25 MG tablet, Take 1 tablet by mouth Daily., Disp: 90 tablet, Rfl: 3  •  finasteride (PROSCAR) 5 MG tablet, Take 5 mg by mouth Daily., Disp: , Rfl:   •  fluticasone (FLONASE) 50 MCG/ACT nasal spray, 1-2 sprays into the nostril(s) as directed by provider Daily., Disp: , Rfl:   •  hydrALAZINE (APRESOLINE) 50 MG tablet, Take 1 tablet by mouth 2 (Two) Times a Day., Disp: 180 tablet, Rfl: 3  •   levothyroxine (SYNTHROID, LEVOTHROID) 88 MCG tablet, Take 1 tablet by mouth Daily., Disp: 90 tablet, Rfl: 1  •  nitroglycerin (NITROSTAT) 0.4 MG SL tablet, Place 0.4 mg under the tongue Every 5 (Five) Minutes As Needed for Chest Pain. Take no more than 3 doses in 15 minutes., Disp: , Rfl:   •  omeprazole (priLOSEC) 40 MG capsule, Take 1 capsule by mouth Daily. Before a meal, Disp: 90 capsule, Rfl: 1  •  rosuvastatin (CRESTOR) 40 MG tablet, Take 1 tablet by mouth Daily., Disp: 90 tablet, Rfl: 3  •  sucralfate (Carafate) 1 g tablet, Take 1 tablet by mouth three time a day on an empty stomach,  1 hour before meals, Disp: 270 tablet, Rfl: 1  •  tamsulosin (FLOMAX) 0.4 MG capsule 24 hr capsule, Take 1 capsule by mouth Daily. 1/2 HOUR FOLLOWING THE SAME MEAL EACH DAY, Disp: , Rfl:   •  TiZANidine (ZANAFLEX) 2 MG capsule, Take 2 mg by mouth Every 8 (Eight) Hours As Needed for Muscle Spasms., Disp: , Rfl:   •  traMADol (ULTRAM) 50 MG tablet, Take 50 mg by mouth Every 6 (Six) Hours As Needed for Moderate Pain ., Disp: , Rfl:   Allergies: Patient has no known allergies.    Physical Exam  Constitutional:       Appearance: Normal appearance.   HENT:      Right Ear: Tympanic membrane normal.      Left Ear: Tympanic membrane normal.      Mouth/Throat:      Pharynx: Oropharynx is clear.   Eyes:      Conjunctiva/sclera: Conjunctivae normal.      Pupils: Pupils are equal, round, and reactive to light.   Cardiovascular:      Rate and Rhythm: Normal rate and regular rhythm.      Heart sounds: Normal heart sounds.   Pulmonary:      Effort: Pulmonary effort is normal.      Breath sounds: Normal breath sounds.   Abdominal:      Palpations: Abdomen is soft.      Tenderness: There is no abdominal tenderness.   Musculoskeletal:      Right knee: No swelling. Normal range of motion. Tenderness present.   Neurological:      Mental Status: He is oriented to person, place, and time.   Psychiatric:         Mood and Affect: Mood normal.          Behavior: Behavior normal.             Assessment and Plan   Diagnoses and all orders for this visit:    1. Primary hypothyroidism (Primary)  Refilled medication. Will rtc for labs.   2. Chronic gout without tophus, unspecified cause, unspecified site  -     Uric Acid; Future  Will check uric acid level.   3. Primary hypertension  Assessment & Plan:  Continue follow up with cardiology  Orders:  -     CBC & Differential; Future  -     TSH; Future    4. Mixed hyperlipidemia  Assessment & Plan:  Continue follow up with cardiology    Orders:  -     Comprehensive Metabolic Panel; Future  -     Lipid Panel; Future    5. Gastroesophageal reflux disease, unspecified whether esophagitis present  Refilled omeprazole.   6. Chronic pain of right knee  -     XR Knee 3 View Right; Future  Will rtc for xray of knee and will f/up with ortho with persistent discomfort.   7. Coronary artery disease involving native coronary artery of native heart without angina pectoris  Assessment & Plan:  Continue follow up with cardiology      8. Atrial fibrillation, chronic (HCC)  Assessment & Plan:  Continue follow up with cardiology      9. Essential hypertension  Assessment & Plan:  Continue follow up with cardiology      10. Hyperlipidemia LDL goal <70  Assessment & Plan:  Continue follow up with cardiology      11. Presence of cardiac pacemaker  Assessment & Plan:  Continue follow up with cardiology      12. Encounter for subsequent annual wellness visit (AWV) in Medicare patient  Assessment & Plan:  Updated annual wellness visit checklist.  Immunizations discussed. Patient declined Shingles vaccine. Patient to follow up with pharmacy about Tdap. Patient is past routine screening age. No complaints of change in bowel patterns or rectal bleeding.  Recommend yearly dental and eye exams. Also discussed monitoring of blood pressure and lipids. We addressed patient self-assessment of health status, frailty, and physical functioning. We reviewed  psychosocial risks, behavioral risks, instrumental activities of daily living, and patient health risk assessment. Patient was given a personalized prevention plan.         Other orders  -     levothyroxine (SYNTHROID, LEVOTHROID) 88 MCG tablet; Take 1 tablet by mouth Daily.  Dispense: 90 tablet; Refill: 1  -     omeprazole (priLOSEC) 40 MG capsule; Take 1 capsule by mouth Daily. Before a meal  Dispense: 90 capsule; Refill: 1            Follow Up   Return in about 1 year (around 7/5/2023) for Medicare Wellness.  Patient was given instructions and counseling regarding his condition or for health maintenance advice. Please see specific information pulled into the AVS if appropriate.     Gloria Siegel PA-C

## 2022-07-13 ENCOUNTER — LAB (OUTPATIENT)
Dept: FAMILY MEDICINE CLINIC | Facility: CLINIC | Age: 82
End: 2022-07-13

## 2022-07-13 DIAGNOSIS — E78.2 MIXED HYPERLIPIDEMIA: ICD-10-CM

## 2022-07-13 DIAGNOSIS — I10 PRIMARY HYPERTENSION: ICD-10-CM

## 2022-07-13 DIAGNOSIS — M1A.9XX0 CHRONIC GOUT WITHOUT TOPHUS, UNSPECIFIED CAUSE, UNSPECIFIED SITE: ICD-10-CM

## 2022-07-13 PROCEDURE — 36415 COLL VENOUS BLD VENIPUNCTURE: CPT | Performed by: PHYSICIAN ASSISTANT

## 2022-07-14 LAB
ALBUMIN SERPL-MCNC: 4.1 G/DL (ref 3.6–4.6)
ALBUMIN/GLOB SERPL: 1.4 {RATIO} (ref 1.2–2.2)
ALP SERPL-CCNC: 86 IU/L (ref 44–121)
ALT SERPL-CCNC: 12 IU/L (ref 0–44)
AST SERPL-CCNC: 20 IU/L (ref 0–40)
BASOPHILS # BLD AUTO: 0.1 X10E3/UL (ref 0–0.2)
BASOPHILS NFR BLD AUTO: 1 %
BILIRUB SERPL-MCNC: 0.4 MG/DL (ref 0–1.2)
BUN SERPL-MCNC: 20 MG/DL (ref 8–27)
BUN/CREAT SERPL: 19 (ref 10–24)
CALCIUM SERPL-MCNC: 9.2 MG/DL (ref 8.6–10.2)
CHLORIDE SERPL-SCNC: 103 MMOL/L (ref 96–106)
CHOLEST SERPL-MCNC: 118 MG/DL (ref 100–199)
CO2 SERPL-SCNC: 22 MMOL/L (ref 20–29)
CREAT SERPL-MCNC: 1.05 MG/DL (ref 0.76–1.27)
EGFRCR SERPLBLD CKD-EPI 2021: 71 ML/MIN/1.73
EOSINOPHIL # BLD AUTO: 0.5 X10E3/UL (ref 0–0.4)
EOSINOPHIL NFR BLD AUTO: 6 %
ERYTHROCYTE [DISTWIDTH] IN BLOOD BY AUTOMATED COUNT: 15.7 % (ref 11.6–15.4)
GLOBULIN SER CALC-MCNC: 2.9 G/DL (ref 1.5–4.5)
GLUCOSE SERPL-MCNC: 101 MG/DL (ref 65–99)
HCT VFR BLD AUTO: 38.8 % (ref 37.5–51)
HDLC SERPL-MCNC: 41 MG/DL
HGB BLD-MCNC: 12.9 G/DL (ref 13–17.7)
IMM GRANULOCYTES # BLD AUTO: 0 X10E3/UL (ref 0–0.1)
IMM GRANULOCYTES NFR BLD AUTO: 1 %
LDLC SERPL CALC-MCNC: 60 MG/DL (ref 0–99)
LYMPHOCYTES # BLD AUTO: 1.9 X10E3/UL (ref 0.7–3.1)
LYMPHOCYTES NFR BLD AUTO: 24 %
MCH RBC QN AUTO: 28.7 PG (ref 26.6–33)
MCHC RBC AUTO-ENTMCNC: 33.2 G/DL (ref 31.5–35.7)
MCV RBC AUTO: 86 FL (ref 79–97)
MONOCYTES # BLD AUTO: 0.8 X10E3/UL (ref 0.1–0.9)
MONOCYTES NFR BLD AUTO: 10 %
NEUTROPHILS # BLD AUTO: 4.7 X10E3/UL (ref 1.4–7)
NEUTROPHILS NFR BLD AUTO: 58 %
PLATELET # BLD AUTO: 259 X10E3/UL (ref 150–450)
POTASSIUM SERPL-SCNC: 4.4 MMOL/L (ref 3.5–5.2)
PROT SERPL-MCNC: 7 G/DL (ref 6–8.5)
RBC # BLD AUTO: 4.49 X10E6/UL (ref 4.14–5.8)
SODIUM SERPL-SCNC: 140 MMOL/L (ref 134–144)
TRIGL SERPL-MCNC: 87 MG/DL (ref 0–149)
TSH SERPL DL<=0.005 MIU/L-ACNC: 1.17 UIU/ML (ref 0.45–4.5)
URATE SERPL-MCNC: 3.4 MG/DL (ref 3.8–8.4)
VLDLC SERPL CALC-MCNC: 17 MG/DL (ref 5–40)
WBC # BLD AUTO: 8 X10E3/UL (ref 3.4–10.8)

## 2022-07-18 NOTE — TELEPHONE ENCOUNTER
Caller: Ming Guerrae    Relationship: Self    Best call back number: [phone]    Requested Prescriptions:   Requested Prescriptions     Pending Prescriptions Disp Refills   • allopurinol (ZYLOPRIM) 100 MG tablet 180 tablet 0     Sig: Take 1 tablet by mouth 2 (Two) Times a Day.        Pharmacy where request should be sent: [facility]) - [facility] [address] [phone] [facility] [phone] [facility]     Additional details provided by patient: PATIENT HAS 8 DAYS. PATIENT ALSO REQUESTING HIS LAB RESULTS  Does the patient have less than a 3 day supply:  [x] Yes  [] No    Jossie Smith Rep   07/18/22 10:16 EDT

## 2022-07-20 ENCOUNTER — TELEPHONE (OUTPATIENT)
Dept: FAMILY MEDICINE CLINIC | Facility: CLINIC | Age: 82
End: 2022-07-20

## 2022-07-21 RX ORDER — ALLOPURINOL 100 MG/1
100 TABLET ORAL 2 TIMES DAILY
Qty: 180 TABLET | Refills: 1 | Status: SHIPPED | OUTPATIENT
Start: 2022-07-21 | End: 2022-12-12 | Stop reason: SDUPTHER

## 2022-08-01 ENCOUNTER — TELEPHONE (OUTPATIENT)
Dept: FAMILY MEDICINE CLINIC | Facility: CLINIC | Age: 82
End: 2022-08-01

## 2022-09-06 RX ORDER — SUCRALFATE 1 G/1
TABLET ORAL
Qty: 270 TABLET | Refills: 1 | Status: SHIPPED | OUTPATIENT
Start: 2022-09-06 | End: 2023-02-14 | Stop reason: SDUPTHER

## 2022-10-19 ENCOUNTER — OFFICE VISIT (OUTPATIENT)
Dept: CARDIOLOGY | Facility: CLINIC | Age: 82
End: 2022-10-19

## 2022-10-19 VITALS
TEMPERATURE: 98 F | DIASTOLIC BLOOD PRESSURE: 72 MMHG | SYSTOLIC BLOOD PRESSURE: 134 MMHG | BODY MASS INDEX: 28.56 KG/M2 | HEIGHT: 71 IN | WEIGHT: 204 LBS | RESPIRATION RATE: 16 BRPM | OXYGEN SATURATION: 99 % | HEART RATE: 72 BPM

## 2022-10-19 DIAGNOSIS — I10 PRIMARY HYPERTENSION: ICD-10-CM

## 2022-10-19 DIAGNOSIS — E78.2 MIXED HYPERLIPIDEMIA: ICD-10-CM

## 2022-10-19 DIAGNOSIS — I48.20 ATRIAL FIBRILLATION, CHRONIC: Primary | ICD-10-CM

## 2022-10-19 DIAGNOSIS — I25.10 CORONARY ARTERY DISEASE INVOLVING NATIVE CORONARY ARTERY OF NATIVE HEART WITHOUT ANGINA PECTORIS: ICD-10-CM

## 2022-10-19 DIAGNOSIS — Z95.0 PRESENCE OF CARDIAC PACEMAKER: ICD-10-CM

## 2022-10-19 DIAGNOSIS — I10 ESSENTIAL HYPERTENSION, BENIGN: ICD-10-CM

## 2022-10-19 DIAGNOSIS — I10 ESSENTIAL HYPERTENSION: ICD-10-CM

## 2022-10-19 PROCEDURE — 93000 ELECTROCARDIOGRAM COMPLETE: CPT | Performed by: INTERNAL MEDICINE

## 2022-10-19 PROCEDURE — 99214 OFFICE O/P EST MOD 30 MIN: CPT | Performed by: INTERNAL MEDICINE

## 2022-10-19 RX ORDER — AMLODIPINE BESYLATE 5 MG/1
5 TABLET ORAL DAILY
Qty: 90 TABLET | Refills: 3 | Status: SHIPPED | OUTPATIENT
Start: 2022-10-19

## 2022-10-19 RX ORDER — ROSUVASTATIN CALCIUM 40 MG/1
40 TABLET, COATED ORAL DAILY
Qty: 90 TABLET | Refills: 3 | Status: SHIPPED | OUTPATIENT
Start: 2022-10-19

## 2022-10-19 RX ORDER — HYDRALAZINE HYDROCHLORIDE 50 MG/1
50 TABLET, FILM COATED ORAL 2 TIMES DAILY
Qty: 180 TABLET | Refills: 3 | Status: SHIPPED | OUTPATIENT
Start: 2022-10-19

## 2022-10-19 RX ORDER — CHLORTHALIDONE 25 MG/1
25 TABLET ORAL DAILY
Qty: 90 TABLET | Refills: 3 | Status: SHIPPED | OUTPATIENT
Start: 2022-10-19

## 2022-10-19 NOTE — PROGRESS NOTES
[facility] [place]  [facility] CARDIOLOGY  [address] [place] [address]  Dept: [phone]  Dept Fax: [phone]    Reg Guerra  1940    Follow Up Office Visit Note    History of Present Illness:  Reg Guerra is a 82 y.o. male who presents to the clinic for Follow-up. CAD- He is s/p CABG  in 2008 Normal EF, denies any chest pain, SOB, has stress nuclear in 2016 with small reversible lateral defect, on ASA. No complaints     The following portions of the patient's history were reviewed and updated as appropriate: allergies, current medications, past family history, past medical history, past social history, past surgical history and problem list.    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 (HCC)    • Benign prostate hyperplasia    • Bilateral cataracts    • BMI 28.0-28.9,adult    • Chronic atrial fibrillation (HCC)    • Chronic gout, unspecified, without tophus (tophi)    • Chronic gouty arthritis    • Chronic low back pain    • Diabetes (HCC)    • DJD (degenerative joint disease)    • Dyslipidemia    • Essential hypertension    • Gastroesophageal reflux disease without esophagitis    • Hiatal hernia    • History of rheumatic fever as a child    • Hyperlipidemia    • Knee injury    • Mixed hyperlipidemia    • Obesity    • Peptic ulcer disease    • Presence of cardiac pacemaker        Past Surgical History:   Procedure Laterality Date   • APPENDECTOMY  1957   • BYPASS GRAFT     • CARDIAC PACEMAKER PLACEMENT      VVI   • KIDNEY STONE SURGERY       "LITHOTRIPSY   • KNEE SURGERY  1960   • MANDIBLE FRACTURE SURGERY  2009    KICKED BY HORSE   • NISSEN FUNDOPLICATION     • TURP / TRANSURETHRAL INCISION / DRAINAGE PROSTATE     • VENTRAL HERNIA REPAIR         Family History   Problem Relation Age of Onset   • Pancreatic cancer Father    • Heart attack Sister    • Diabetes Sister    • Hypertension Sister         Social History     Socioeconomic History   • Marital status:    • Number of children: 2   • Highest education level: 12th grade   Tobacco Use   • Smoking status: Never   • Smokeless tobacco: Never   Vaping Use   • Vaping Use: Never used   Substance and Sexual Activity   • Alcohol use: Never   • Drug use: Never   • Sexual activity: Defer       Review of Systems   Constitutional: Negative.    HENT: Negative.    Respiratory: Negative.    Cardiovascular: Negative.    Endocrine: Negative.    Genitourinary: Negative.    Musculoskeletal: Negative.    Skin: Negative.    Allergic/Immunologic: Negative.    Neurological: Negative.    Hematological: Negative.    Psychiatric/Behavioral: Negative.        Cardiovascular Procedures    ECHO/MUGA:   STRESS TESTS:   CARDIAC CATH:   DEVICES:   HOLTER:   CT/MRI:   VASCULAR:   CARDIOTHORACIC:     Objective  Vitals:    10/19/22 0802   BP: 134/72   BP Location: Right arm   Patient Position: Lying   Cuff Size: Adult   Pulse: 72   Resp: 16   Temp: 98 °F (36.7 °C)   TempSrc: Infrared   SpO2: 99%   Weight: 92.5 kg (204 lb)   Height: 180.3 cm (71\")   PainSc: 0-No pain     Body mass index is 28.45 kg/m².     Physical Exam  Vitals reviewed.   Constitutional:       Appearance: Healthy appearance. Not in distress.   Eyes:      Pupils: Pupils are equal, round, and reactive to light.   HENT:    Mouth/Throat:      Pharynx: Oropharynx is clear.   Neck:      Thyroid: Thyroid normal.      Vascular: No JVR. JVD normal.   Pulmonary:      Effort: Pulmonary effort is normal.      Breath sounds: Normal breath sounds. No wheezing. No rhonchi. No " rales.   Chest:      Chest wall: Not tender to palpatation.   Cardiovascular:      PMI at left midclavicular line. Normal rate. Irregularly irregular rhythm. Normal S1. Normal S2.      Murmurs: There is no murmur.      No gallop. No click. No rub.   Pulses:     Intact distal pulses.   Edema:     Peripheral edema absent.   Abdominal:      General: Bowel sounds are normal.      Palpations: Abdomen is soft.      Tenderness: There is no abdominal tenderness.   Musculoskeletal: Normal range of motion.         General: No tenderness.      Cervical back: Normal range of motion and neck supple. Skin:     General: Skin is warm and dry.   Neurological:      General: No focal deficit present.      Mental Status: Alert and oriented to person, place and time.          Diagnostic Data    ECG 12 Lead    Date/Time: 10/19/2022 8:27 AM  Performed by: Fredy Yee MD  Authorized by: Fredy Yee MD   Comparison: compared with previous ECG from 10/18/2021  Similar to previous ECG  Rhythm: atrial fibrillation  Rate: normal  BPM: 73  Conduction: left bundle branch block  QRS axis: left    Clinical impression: abnormal EKG            Assessment and Plan  Diagnoses and all orders for this visit:    Atrial fibrillation, chronic (HCC)-rate control on no meds s/p pacemaker, no blood thinner he refuses just asa,     Presence of cardiac pacemaker- This is working well    Mixed hyperlipidemia- Lipids are great on Crestor 40 mg    Primary hypertension- BP is 135.80 on Hydralazine 50 bid, Amlodipine 5 mg and also chlorthalidone 25 mg     Coronary artery disease involving native coronary artery of native heart without angina pectoris- No chest pain, no SOB, has had CABG in 20008 and stress in 2016 with small reversible lateral defect.         Return in about 6 months (around 4/19/2023) for Recheck.    Fredy Yee MD  10/19/2022

## 2022-11-28 RX ORDER — OMEPRAZOLE 40 MG/1
40 CAPSULE, DELAYED RELEASE ORAL DAILY
Qty: 90 CAPSULE | Refills: 3 | OUTPATIENT
Start: 2022-11-28

## 2022-11-28 NOTE — TELEPHONE ENCOUNTER
Patient should have fills until January. They are requesting the refill too early. Sent [facility] a msg

## 2022-12-08 NOTE — TELEPHONE ENCOUNTER
It looks like has enough until due for f/up appt which is next month-- if can't get in can send refill x 1. Thanks.

## 2022-12-12 RX ORDER — ALLOPURINOL 100 MG/1
100 TABLET ORAL 2 TIMES DAILY
Qty: 180 TABLET | Refills: 0 | Status: SHIPPED | OUTPATIENT
Start: 2022-12-12 | End: 2023-02-14 | Stop reason: SDUPTHER

## 2022-12-12 RX ORDER — LEVOTHYROXINE SODIUM 88 UG/1
88 TABLET ORAL DAILY
Qty: 90 TABLET | Refills: 0 | Status: SHIPPED | OUTPATIENT
Start: 2022-12-12 | End: 2023-02-14 | Stop reason: SDUPTHER

## 2022-12-12 RX ORDER — LEVOTHYROXINE SODIUM 88 UG/1
88 TABLET ORAL DAILY
Qty: 90 TABLET | Refills: 3 | OUTPATIENT
Start: 2022-12-12

## 2022-12-12 RX ORDER — ALLOPURINOL 100 MG/1
TABLET ORAL
Qty: 180 TABLET | Refills: 3 | OUTPATIENT
Start: 2022-12-12

## 2023-02-14 ENCOUNTER — OFFICE VISIT (OUTPATIENT)
Dept: FAMILY MEDICINE CLINIC | Facility: CLINIC | Age: 83
End: 2023-02-14
Payer: MEDICARE

## 2023-02-14 ENCOUNTER — TELEPHONE (OUTPATIENT)
Dept: FAMILY MEDICINE CLINIC | Facility: CLINIC | Age: 83
End: 2023-02-14

## 2023-02-14 VITALS
HEART RATE: 96 BPM | DIASTOLIC BLOOD PRESSURE: 70 MMHG | BODY MASS INDEX: 28.98 KG/M2 | OXYGEN SATURATION: 98 % | SYSTOLIC BLOOD PRESSURE: 160 MMHG | WEIGHT: 207 LBS | HEIGHT: 71 IN

## 2023-02-14 DIAGNOSIS — H10.31 ACUTE BACTERIAL CONJUNCTIVITIS OF RIGHT EYE: Primary | ICD-10-CM

## 2023-02-14 DIAGNOSIS — E78.2 MIXED HYPERLIPIDEMIA: ICD-10-CM

## 2023-02-14 DIAGNOSIS — E03.9 PRIMARY HYPOTHYROIDISM: ICD-10-CM

## 2023-02-14 DIAGNOSIS — I10 ESSENTIAL HYPERTENSION: ICD-10-CM

## 2023-02-14 DIAGNOSIS — M1A.9XX0 CHRONIC GOUT WITHOUT TOPHUS, UNSPECIFIED CAUSE, UNSPECIFIED SITE: ICD-10-CM

## 2023-02-14 DIAGNOSIS — K21.9 GASTROESOPHAGEAL REFLUX DISEASE, UNSPECIFIED WHETHER ESOPHAGITIS PRESENT: ICD-10-CM

## 2023-02-14 PROCEDURE — 99214 OFFICE O/P EST MOD 30 MIN: CPT | Performed by: PHYSICIAN ASSISTANT

## 2023-02-14 RX ORDER — MOXIFLOXACIN 5 MG/ML
SOLUTION/ DROPS OPHTHALMIC
Qty: 3 ML | Refills: 0 | Status: SHIPPED | OUTPATIENT
Start: 2023-02-14

## 2023-02-14 RX ORDER — SUCRALFATE 1 G/1
TABLET ORAL
Qty: 270 TABLET | Refills: 1 | Status: SHIPPED | OUTPATIENT
Start: 2023-02-14

## 2023-02-14 RX ORDER — LEVOTHYROXINE SODIUM 88 UG/1
88 TABLET ORAL DAILY
Qty: 90 TABLET | Refills: 1 | Status: SHIPPED | OUTPATIENT
Start: 2023-02-14

## 2023-02-14 RX ORDER — SUCRALFATE 1 G/1
TABLET ORAL
Qty: 270 TABLET | Refills: 3 | OUTPATIENT
Start: 2023-02-14

## 2023-02-14 RX ORDER — OMEPRAZOLE 40 MG/1
40 CAPSULE, DELAYED RELEASE ORAL DAILY
Qty: 90 CAPSULE | Refills: 1 | Status: SHIPPED | OUTPATIENT
Start: 2023-02-14

## 2023-02-14 RX ORDER — ALLOPURINOL 100 MG/1
100 TABLET ORAL 2 TIMES DAILY
Qty: 180 TABLET | Refills: 1 | Status: SHIPPED | OUTPATIENT
Start: 2023-02-14

## 2023-02-14 NOTE — PROGRESS NOTES
"Chief Complaint  Med Refill    Subjective          Reg Guerra presents to [facility]  History of Present Illness  Patient in today for medication recheck and refills.  He states he has been feeling well.  States his blood pressure normally runs in normal range.  He had not taken his medicine yet this morning.  He denies any chest pain or shortness of breath.  He states he has follow-up with his cardiologist in 2 months.  He is here today fasting for labs.  He states he sees his urologist next month and follow-up as well.  Overall states has been feeling well.  He states has noticed some itching/irritation  and yellow drainage from right eye over the past 2 days.  Denies any visual loss. Denies any injury to eye.   Hypertension  This is a chronic problem. The problem is controlled. Pertinent negatives include no chest pain, palpitations, peripheral edema or shortness of breath.   Hyperlipidemia  This is a chronic problem. Pertinent negatives include no chest pain or shortness of breath. Current antihyperlipidemic treatment includes statins.   Heartburn  He complains of heartburn. He reports no abdominal pain, no chest pain, no coughing, no dysphagia, no hoarse voice, no nausea or no sore throat. Pertinent negatives include no fatigue.       Objective   Vital Signs:   /70 (BP Location: Left arm, Patient Position: Sitting, Cuff Size: Large Adult)   Pulse 96   Ht 180.3 cm (71\")   Wt 93.9 kg (207 lb)   SpO2 98%   BMI 28.87 kg/m²     Body mass index is 28.87 kg/m².    Review of Systems   Constitutional: Negative for fatigue.   HENT: Negative for congestion, hoarse voice and sore throat.    Respiratory: Negative for cough and shortness of breath.    Cardiovascular: Negative for chest pain, palpitations and leg swelling.   Gastrointestinal: Positive for GERD. Negative for abdominal pain, blood in stool, diarrhea, dysphagia, nausea and vomiting.   Neurological: Negative for " dizziness and headache.       Past History:  Medical History: has a past medical history of Acquired hypothyroidism, Allergic rhinitis due to pollen, Appendicitis, Atherosclerotic heart disease of native coronary artery with other forms of angina pectoris (HCC), Benign prostate hyperplasia, Bilateral cataracts, BMI 28.0-28.9,adult, Chronic atrial fibrillation (HCC), Chronic gout, unspecified, without tophus (tophi), Chronic gouty arthritis, Chronic low back pain, Diabetes (HCC), DJD (degenerative joint disease), Dyslipidemia, Essential hypertension, Gastroesophageal reflux disease without esophagitis, Hiatal hernia, History of rheumatic fever as a child, Hyperlipidemia, Knee injury, Mixed hyperlipidemia, Obesity, Peptic ulcer disease, and Presence of cardiac pacemaker.   Surgical History: has a past surgical history that includes Knee surgery (1960); Appendectomy (1957); Ventral hernia repair; Kidney stone surgery; Cardiac pacemaker placement; Nissen fundoplication; TURP / transurethral incision / drainage prostate; Mandible fracture surgery (2009); and Bypass Graft.   Family History: family history includes Diabetes in his sister; Heart attack in his sister; Hypertension in his sister; Pancreatic cancer in his father.   Social History: reports that he has never smoked. He has never used smokeless tobacco. He reports that he does not drink alcohol and does not use drugs.      Current Outpatient Medications:   •  allopurinol (ZYLOPRIM) 100 MG tablet, Take 1 tablet by mouth 2 (Two) Times a Day., Disp: 180 tablet, Rfl: 1  •  amLODIPine (NORVASC) 5 MG tablet, Take 1 tablet by mouth Daily., Disp: 90 tablet, Rfl: 3  •  aspirin (aspirin) 81 MG EC tablet, Take 1 tablet by mouth Daily., Disp: 90 tablet, Rfl: 3  •  cetirizine (zyrTEC) 10 MG tablet, Take 10 mg by mouth Daily., Disp: , Rfl:   •  chlorthalidone (HYGROTON) 25 MG tablet, Take 1 tablet by mouth Daily., Disp: 90 tablet, Rfl: 3  •  finasteride (PROSCAR) 5 MG tablet,  Take 5 mg by mouth Daily., Disp: , Rfl:   •  fluticasone (FLONASE) 50 MCG/ACT nasal spray, 1-2 sprays into the nostril(s) as directed by provider Daily., Disp: , Rfl:   •  hydrALAZINE (APRESOLINE) 50 MG tablet, Take 1 tablet by mouth 2 (Two) Times a Day., Disp: 180 tablet, Rfl: 3  •  levothyroxine (SYNTHROID, LEVOTHROID) 88 MCG tablet, Take 1 tablet by mouth Daily., Disp: 90 tablet, Rfl: 1  •  nitroglycerin (NITROSTAT) 0.4 MG SL tablet, Place 0.4 mg under the tongue Every 5 (Five) Minutes As Needed for Chest Pain. Take no more than 3 doses in 15 minutes., Disp: , Rfl:   •  omeprazole (priLOSEC) 40 MG capsule, Take 1 capsule by mouth Daily. Before a meal, Disp: 90 capsule, Rfl: 1  •  rosuvastatin (CRESTOR) 40 MG tablet, Take 1 tablet by mouth Daily., Disp: 90 tablet, Rfl: 3  •  sucralfate (CARAFATE) 1 g tablet, TAKE 1 TABLET BY MOUTH 3  TIMES DAILY ON AN EMPTY  STOMACH 1 HOUR BEFORE MEALS, Disp: 270 tablet, Rfl: 1  •  tamsulosin (FLOMAX) 0.4 MG capsule 24 hr capsule, Take 1 capsule by mouth Daily. 1/2 HOUR FOLLOWING THE SAME MEAL EACH DAY, Disp: , Rfl:   •  TiZANidine (ZANAFLEX) 2 MG capsule, Take 2 mg by mouth Every 8 (Eight) Hours As Needed for Muscle Spasms., Disp: , Rfl:   •  traMADol (ULTRAM) 50 MG tablet, Take 50 mg by mouth Every 6 (Six) Hours As Needed for Moderate Pain ., Disp: , Rfl:   Allergies: Patient has no known allergies.    Physical Exam  Constitutional:       Appearance: Normal appearance.   HENT:      Right Ear: Tympanic membrane normal.      Left Ear: Tympanic membrane normal.      Mouth/Throat:      Pharynx: Oropharynx is clear.   Eyes:      General:         Right eye: Discharge present.      Extraocular Movements: Extraocular movements intact.      Conjunctiva/sclera: Conjunctivae normal.      Pupils: Pupils are equal, round, and reactive to light.      Comments: Yellow discharge noted to right eye with minimal redness noted to eye. Left eye appears clear.    Cardiovascular:      Rate and  Rhythm: Normal rate and regular rhythm.      Heart sounds: Normal heart sounds.   Pulmonary:      Effort: Pulmonary effort is normal.      Breath sounds: Normal breath sounds.   Abdominal:      Palpations: Abdomen is soft.      Tenderness: There is no abdominal tenderness.   Neurological:      Mental Status: He is oriented to person, place, and time.   Psychiatric:         Mood and Affect: Mood normal.         Behavior: Behavior normal.             Assessment and Plan   Diagnoses and all orders for this visit:    1. Acute bacterial conjunctivitis of right eye (Primary)  Encouraged frequent handwashing. Will send in some antibiotic drops. If not improving,recommend to get in with eye specialist.   2. Essential hypertension  -     CBC & Differential; Future  -     Comprehensive Metabolic Panel; Future  -     TSH; Future  Continue current medication and monitor bp and keep f/up with cardiology as directed.   3. Mixed hyperlipidemia  -     Lipid Panel; Future  Continue medication. Fasting labs today.   4. Chronic gout without tophus, unspecified cause, unspecified site  -     Uric Acid; Future  Refilled allopurinol. Will check uric acid level.   5. Gastroesophageal reflux disease, unspecified whether esophagitis present  Refilled omeprazole and sucralfate.   7. Primary hypothyroidism  Refilled levothyroxine. TSH with labs today.   Other orders  -     allopurinol (ZYLOPRIM) 100 MG tablet; Take 1 tablet by mouth 2 (Two) Times a Day.  Dispense: 180 tablet; Refill: 1  -     levothyroxine (SYNTHROID, LEVOTHROID) 88 MCG tablet; Take 1 tablet by mouth Daily.  Dispense: 90 tablet; Refill: 1  -     sucralfate (CARAFATE) 1 g tablet; TAKE 1 TABLET BY MOUTH 3  TIMES DAILY ON AN EMPTY  STOMACH 1 HOUR BEFORE MEALS  Dispense: 270 tablet; Refill: 1  -     omeprazole (priLOSEC) 40 MG capsule; Take 1 capsule by mouth Daily. Before a meal  Dispense: 90 capsule; Refill: 1            Follow Up   No follow-ups on file.  Patient was given  instructions and counseling regarding his condition or for health maintenance advice. Please see specific information pulled into the AVS if appropriate.     Gloria Siegel PA-C

## 2023-02-15 LAB
ALBUMIN SERPL-MCNC: 4.4 G/DL (ref 3.6–4.6)
ALBUMIN/GLOB SERPL: 1.8 {RATIO} (ref 1.2–2.2)
ALP SERPL-CCNC: 93 IU/L (ref 44–121)
ALT SERPL-CCNC: 12 IU/L (ref 0–44)
AST SERPL-CCNC: 19 IU/L (ref 0–40)
BASOPHILS # BLD AUTO: 0.1 X10E3/UL (ref 0–0.2)
BASOPHILS NFR BLD AUTO: 1 %
BILIRUB SERPL-MCNC: 0.5 MG/DL (ref 0–1.2)
BUN SERPL-MCNC: 16 MG/DL (ref 8–27)
BUN/CREAT SERPL: 15 (ref 10–24)
CALCIUM SERPL-MCNC: 9.4 MG/DL (ref 8.6–10.2)
CHLORIDE SERPL-SCNC: 101 MMOL/L (ref 96–106)
CHOLEST SERPL-MCNC: 124 MG/DL (ref 100–199)
CO2 SERPL-SCNC: 23 MMOL/L (ref 20–29)
CREAT SERPL-MCNC: 1.07 MG/DL (ref 0.76–1.27)
EGFRCR SERPLBLD CKD-EPI 2021: 70 ML/MIN/1.73
EOSINOPHIL # BLD AUTO: 0.3 X10E3/UL (ref 0–0.4)
EOSINOPHIL NFR BLD AUTO: 3 %
ERYTHROCYTE [DISTWIDTH] IN BLOOD BY AUTOMATED COUNT: 15 % (ref 11.6–15.4)
GLOBULIN SER CALC-MCNC: 2.5 G/DL (ref 1.5–4.5)
GLUCOSE SERPL-MCNC: 93 MG/DL (ref 70–99)
HCT VFR BLD AUTO: 39.6 % (ref 37.5–51)
HDLC SERPL-MCNC: 45 MG/DL
HGB BLD-MCNC: 13.1 G/DL (ref 13–17.7)
IMM GRANULOCYTES # BLD AUTO: 0 X10E3/UL (ref 0–0.1)
IMM GRANULOCYTES NFR BLD AUTO: 0 %
LDLC SERPL CALC-MCNC: 58 MG/DL (ref 0–99)
LYMPHOCYTES # BLD AUTO: 2 X10E3/UL (ref 0.7–3.1)
LYMPHOCYTES NFR BLD AUTO: 21 %
MCH RBC QN AUTO: 28.4 PG (ref 26.6–33)
MCHC RBC AUTO-ENTMCNC: 33.1 G/DL (ref 31.5–35.7)
MCV RBC AUTO: 86 FL (ref 79–97)
MONOCYTES # BLD AUTO: 1 X10E3/UL (ref 0.1–0.9)
MONOCYTES NFR BLD AUTO: 10 %
NEUTROPHILS # BLD AUTO: 6.1 X10E3/UL (ref 1.4–7)
NEUTROPHILS NFR BLD AUTO: 65 %
PLATELET # BLD AUTO: 276 X10E3/UL (ref 150–450)
POTASSIUM SERPL-SCNC: 4.2 MMOL/L (ref 3.5–5.2)
PROT SERPL-MCNC: 6.9 G/DL (ref 6–8.5)
RBC # BLD AUTO: 4.61 X10E6/UL (ref 4.14–5.8)
SODIUM SERPL-SCNC: 137 MMOL/L (ref 134–144)
TRIGL SERPL-MCNC: 114 MG/DL (ref 0–149)
TSH SERPL DL<=0.005 MIU/L-ACNC: 1.03 UIU/ML (ref 0.45–4.5)
URATE SERPL-MCNC: 3.5 MG/DL (ref 3.8–8.4)
VLDLC SERPL CALC-MCNC: 21 MG/DL (ref 5–40)
WBC # BLD AUTO: 9.5 X10E3/UL (ref 3.4–10.8)

## 2023-03-22 ENCOUNTER — OFFICE VISIT (OUTPATIENT)
Dept: FAMILY MEDICINE CLINIC | Facility: CLINIC | Age: 83
End: 2023-03-22
Payer: MEDICARE

## 2023-03-22 VITALS
SYSTOLIC BLOOD PRESSURE: 124 MMHG | WEIGHT: 207 LBS | HEIGHT: 71 IN | BODY MASS INDEX: 28.98 KG/M2 | HEART RATE: 88 BPM | OXYGEN SATURATION: 95 % | TEMPERATURE: 97.5 F | DIASTOLIC BLOOD PRESSURE: 62 MMHG

## 2023-03-22 DIAGNOSIS — R07.81 RIB PAIN: Primary | ICD-10-CM

## 2023-03-22 PROCEDURE — 1160F RVW MEDS BY RX/DR IN RCRD: CPT | Performed by: INTERNAL MEDICINE

## 2023-03-22 PROCEDURE — 99213 OFFICE O/P EST LOW 20 MIN: CPT | Performed by: INTERNAL MEDICINE

## 2023-03-22 PROCEDURE — 3074F SYST BP LT 130 MM HG: CPT | Performed by: INTERNAL MEDICINE

## 2023-03-22 PROCEDURE — 1159F MED LIST DOCD IN RCRD: CPT | Performed by: INTERNAL MEDICINE

## 2023-03-22 PROCEDURE — 3078F DIAST BP <80 MM HG: CPT | Performed by: INTERNAL MEDICINE

## 2023-03-22 NOTE — PROGRESS NOTES
Office Note     Name: Reg Guerra    : 1940     MRN: [id number]     Chief Complaint  Abdominal Pain (Pt complains of LUQ pain onset 1.5 weeks after a fall. )    Subjective     History of Present Illness:  Reg Guerra is a 82 y.o. male who presents today for rib pain      2 weeks ago was carryign a generator and tripped over a toy and landed hard on the concrete floor wit his right ribcage, had right rib and hip pain at first which is better. however  Starting about 5 days later he is now having left sided rib pain.  Hurts to turn or cough/lift, hurts to take a deep breath.    Review of Systems:   Review of Systems    Past Medical History:   Past Medical History:   Diagnosis Date   • Acquired hypothyroidism    • Allergic rhinitis due to pollen    • Appendicitis    • Atherosclerotic heart disease of native coronary artery with other forms of angina pectoris (HCC)    • Benign prostate hyperplasia    • Bilateral cataracts    • BMI 28.0-28.9,adult    • Chronic atrial fibrillation (HCC)    • Chronic gout, unspecified, without tophus (tophi)    • Chronic gouty arthritis    • Chronic low back pain    • Diabetes (HCC)    • DJD (degenerative joint disease)    • Dyslipidemia    • Essential hypertension    • Gastroesophageal reflux disease without esophagitis    • Hiatal hernia    • History of rheumatic fever as a child    • Hyperlipidemia    • Knee injury    • Mixed hyperlipidemia    • Obesity    • Peptic ulcer disease    • Presence of cardiac pacemaker        Past Surgical History:   Past Surgical History:   Procedure Laterality Date   • APPENDECTOMY     • BYPASS GRAFT     • CARDIAC PACEMAKER PLACEMENT      VVI   • KIDNEY STONE SURGERY      LITHOTRIPSY   • KNEE SURGERY     • MANDIBLE FRACTURE SURGERY      KICKED BY HORSE   • NISSEN FUNDOPLICATION     • TURP / TRANSURETHRAL INCISION / DRAINAGE PROSTATE     • VENTRAL HERNIA REPAIR         Family History:   Family History   Problem Relation Age of  Onset   • Pancreatic cancer Father    • Heart attack Sister    • Diabetes Sister    • Hypertension Sister        Social History:   Social History     Socioeconomic History   • Marital status:    • Number of children: 2   • Highest education level: 12th grade   Tobacco Use   • Smoking status: Never   • Smokeless tobacco: Never   Vaping Use   • Vaping Use: Never used   Substance and Sexual Activity   • Alcohol use: Never   • Drug use: Never   • Sexual activity: Defer       Immunizations:   Immunization History   Administered Date(s) Administered   • COVID-19 (MODERNA) 1st, 2nd, 3rd Dose Only 02/24/2021, 02/24/2021, 03/24/2021, 11/05/2021   • Flu Vaccine Quad PF >36MO 09/27/2012, 11/11/2019   • Fluzone High Dose =>65 Years ([employer] ONLY) 10/07/2016   • Fluzone High-Dose 65+yrs 10/16/2021   • Pneumococcal Conjugate 13-Valent (PCV13) 10/08/2018   • Pneumococcal Polysaccharide (PPSV23) 11/11/2019        Medications:     Current Outpatient Medications:   •  allopurinol (ZYLOPRIM) 100 MG tablet, Take 1 tablet by mouth 2 (Two) Times a Day., Disp: 180 tablet, Rfl: 1  •  amLODIPine (NORVASC) 5 MG tablet, Take 1 tablet by mouth Daily., Disp: 90 tablet, Rfl: 3  •  aspirin (aspirin) 81 MG EC tablet, Take 1 tablet by mouth Daily., Disp: 90 tablet, Rfl: 3  •  cetirizine (zyrTEC) 10 MG tablet, Take 1 tablet by mouth Daily., Disp: , Rfl:   •  chlorthalidone (HYGROTON) 25 MG tablet, Take 1 tablet by mouth Daily., Disp: 90 tablet, Rfl: 3  •  finasteride (PROSCAR) 5 MG tablet, Take 1 tablet by mouth Daily., Disp: , Rfl:   •  fluticasone (FLONASE) 50 MCG/ACT nasal spray, 1-2 sprays into the nostril(s) as directed by provider Daily., Disp: , Rfl:   •  hydrALAZINE (APRESOLINE) 50 MG tablet, Take 1 tablet by mouth 2 (Two) Times a Day., Disp: 180 tablet, Rfl: 3  •  levothyroxine (SYNTHROID, LEVOTHROID) 88 MCG tablet, Take 1 tablet by mouth Daily., Disp: 90 tablet, Rfl: 1  •  moxifloxacin (Vigamox) 0.5 % ophthalmic solution, Apply one  "drop to affected eye three times a day x 7 days, Disp: 3 mL, Rfl: 0  •  nitroglycerin (NITROSTAT) 0.4 MG SL tablet, Place 1 tablet under the tongue Every 5 (Five) Minutes As Needed for Chest Pain. Take no more than 3 doses in 15 minutes., Disp: , Rfl:   •  omeprazole (priLOSEC) 40 MG capsule, Take 1 capsule by mouth Daily. Before a meal, Disp: 90 capsule, Rfl: 1  •  rosuvastatin (CRESTOR) 40 MG tablet, Take 1 tablet by mouth Daily., Disp: 90 tablet, Rfl: 3  •  sucralfate (CARAFATE) 1 g tablet, TAKE 1 TABLET BY MOUTH 3  TIMES DAILY ON AN EMPTY  STOMACH 1 HOUR BEFORE MEALS, Disp: 270 tablet, Rfl: 1  •  tamsulosin (FLOMAX) 0.4 MG capsule 24 hr capsule, Take 1 capsule by mouth Daily. 1/2 HOUR FOLLOWING THE SAME MEAL EACH DAY, Disp: , Rfl:   •  TiZANidine (ZANAFLEX) 2 MG capsule, Take 1 capsule by mouth Every 8 (Eight) Hours As Needed for Muscle Spasms., Disp: , Rfl:   •  traMADol (ULTRAM) 50 MG tablet, Take 1 tablet by mouth Every 6 (Six) Hours As Needed for Moderate Pain., Disp: , Rfl:     Allergies:   No Known Allergies    Objective     Vital Signs  /62   Pulse 88   Temp 97.5 °F (36.4 °C)   Ht 180.3 cm (71\")   Wt 93.9 kg (207 lb)   SpO2 95%   BMI 28.87 kg/m²   Estimated body mass index is 28.87 kg/m² as calculated from the following:    Height as of this encounter: 180.3 cm (71\").    Weight as of this encounter: 93.9 kg (207 lb).          Physical Exam  Vitals and nursing note reviewed.   Constitutional:       Appearance: Normal appearance.   Cardiovascular:      Rate and Rhythm: Normal rate and regular rhythm.      Heart sounds: No murmur heard.    No friction rub. No gallop.   Pulmonary:      Effort: Pulmonary effort is normal.      Breath sounds: Normal breath sounds. No wheezing, rhonchi or rales.   Chest:      Chest wall: Tenderness present.   Neurological:      Mental Status: He is alert.            Procedures     Assessment and Plan     1. Rib pain    - XR Ribs Bilateral 3 View (In Office) showed no " obvious displaced fracture.  Recommend OTC medication for pain and discomfort, patietn should followup if pain worsens or persists or if he develops SOA worsening chest pain.     I spent 20 minutes caring for this patient on this date of service. This time includes time spent by me in the following activities: preparing for the visit, reviewing tests, obtaining and/or reviewing a separately obtained history, counseling and educating the patient/family/caregiver and documenting information in the medical record.    Follow Up  No follow-ups on file.    MD LOREN Razo PC [facility]  [address] [place] [id number] [phone]

## 2023-04-19 ENCOUNTER — OFFICE VISIT (OUTPATIENT)
Dept: CARDIOLOGY | Facility: CLINIC | Age: 83
End: 2023-04-19
Payer: MEDICARE

## 2023-04-19 VITALS
HEIGHT: 71 IN | SYSTOLIC BLOOD PRESSURE: 128 MMHG | RESPIRATION RATE: 18 BRPM | OXYGEN SATURATION: 98 % | DIASTOLIC BLOOD PRESSURE: 68 MMHG | HEART RATE: 70 BPM | WEIGHT: 208.6 LBS | BODY MASS INDEX: 29.2 KG/M2 | TEMPERATURE: 98.6 F

## 2023-04-19 DIAGNOSIS — I10 PRIMARY HYPERTENSION: ICD-10-CM

## 2023-04-19 DIAGNOSIS — E78.2 MIXED HYPERLIPIDEMIA: ICD-10-CM

## 2023-04-19 DIAGNOSIS — I10 ESSENTIAL HYPERTENSION: ICD-10-CM

## 2023-04-19 DIAGNOSIS — Z95.0 PRESENCE OF CARDIAC PACEMAKER: ICD-10-CM

## 2023-04-19 DIAGNOSIS — I48.20 ATRIAL FIBRILLATION, CHRONIC: Primary | ICD-10-CM

## 2023-04-19 DIAGNOSIS — I25.10 CORONARY ARTERY DISEASE INVOLVING NATIVE CORONARY ARTERY OF NATIVE HEART WITHOUT ANGINA PECTORIS: ICD-10-CM

## 2023-04-19 DIAGNOSIS — I10 ESSENTIAL HYPERTENSION, BENIGN: ICD-10-CM

## 2023-04-19 RX ORDER — AMLODIPINE BESYLATE 5 MG/1
5 TABLET ORAL DAILY
Qty: 90 TABLET | Refills: 3 | Status: SHIPPED | OUTPATIENT
Start: 2023-04-19

## 2023-04-19 RX ORDER — CHLORTHALIDONE 25 MG/1
25 TABLET ORAL DAILY
Qty: 90 TABLET | Refills: 3 | Status: SHIPPED | OUTPATIENT
Start: 2023-04-19

## 2023-04-19 RX ORDER — HYDRALAZINE HYDROCHLORIDE 50 MG/1
50 TABLET, FILM COATED ORAL 2 TIMES DAILY
Qty: 180 TABLET | Refills: 3 | Status: SHIPPED | OUTPATIENT
Start: 2023-04-19

## 2023-04-19 RX ORDER — ASPIRIN 81 MG/1
81 TABLET ORAL DAILY
Qty: 90 TABLET | Refills: 3 | Status: SHIPPED | OUTPATIENT
Start: 2023-04-19

## 2023-04-19 RX ORDER — ROSUVASTATIN CALCIUM 40 MG/1
40 TABLET, COATED ORAL DAILY
Qty: 90 TABLET | Refills: 3 | Status: SHIPPED | OUTPATIENT
Start: 2023-04-19

## 2023-04-19 NOTE — PROGRESS NOTES
[facility] [place]  [facility] CARDIOLOGY  [address] [place] [address]  Dept: [phone]  Dept Fax: [phone]    Reg Guerra  1940    Follow Up Office Visit Note    History of Present Illness:  Reg Guerra is a 82 y.o. male who presents to the clinic for Follow-up. CAD- he seems doing good, s/p CABG in 2008 and also has had a stress nuclear in 2016 with mild lateral ischemia, but he is asymptomatic, no chest pain, no SON, normal EF, On ASA,, will keep medical treatment    The following portions of the patient's history were reviewed and updated as appropriate: allergies, current medications, past family history, past medical history, past social history, past surgical history, and problem list.    Medications:  allopurinol  amLODIPine  aspirin  cetirizine  chlorthalidone  finasteride  fluticasone  hydrALAZINE  levothyroxine  moxifloxacin  nitroglycerin  omeprazole  rosuvastatin  sucralfate  tamsulosin capsule  TiZANidine  traMADol    Subjective  No Known Allergies     Past Medical History:   Diagnosis Date   • Acquired hypothyroidism    • Allergic rhinitis due to pollen    • Appendicitis    • Atherosclerotic heart disease of native coronary artery with other forms of angina pectoris    • Benign prostate hyperplasia    • Bilateral cataracts    • BMI 28.0-28.9,adult    • Chronic atrial fibrillation    • Chronic gout, unspecified, without tophus (tophi)    • Chronic gouty arthritis    • Chronic low back pain    • Diabetes    • DJD (degenerative joint disease)    • Dyslipidemia    • Essential hypertension    • Gastroesophageal reflux disease without esophagitis    • Hiatal hernia    • History of rheumatic fever as a child    • Hyperlipidemia    • Knee injury    • Mixed hyperlipidemia    • Obesity    • Peptic ulcer disease    • Presence of cardiac pacemaker        Past Surgical History:   Procedure Laterality Date   • APPENDECTOMY  1957   • BYPASS GRAFT     • CARDIAC PACEMAKER  "PLACEMENT      VVI   • KIDNEY STONE SURGERY      LITHOTRIPSY   • KNEE SURGERY  1960   • MANDIBLE FRACTURE SURGERY  2009    KICKED BY HORSE   • NISSEN FUNDOPLICATION     • TURP / TRANSURETHRAL INCISION / DRAINAGE PROSTATE     • VENTRAL HERNIA REPAIR         Family History   Problem Relation Age of Onset   • Pancreatic cancer Father    • Heart attack Sister    • Diabetes Sister    • Hypertension Sister         Social History     Socioeconomic History   • Marital status:    • Number of children: 2   • Highest education level: 12th grade   Tobacco Use   • Smoking status: Never   • Smokeless tobacco: Never   Vaping Use   • Vaping Use: Never used   Substance and Sexual Activity   • Alcohol use: Never   • Drug use: Never   • Sexual activity: Defer       Review of Systems   Constitutional: Negative.    HENT: Negative.    Respiratory: Negative.    Cardiovascular: Negative.    Endocrine: Negative.    Genitourinary: Negative.    Musculoskeletal: Negative.    Skin: Negative.    Allergic/Immunologic: Negative.    Neurological: Negative.    Hematological: Negative.    Psychiatric/Behavioral: Negative.        Cardiovascular Procedures    ECHO/MUGA:  STRESS TESTS:   CARDIAC CATH:   DEVICES:   HOLTER:   CT/MRI:   VASCULAR:   CARDIOTHORACIC:     Objective  Vitals:    04/19/23 0911   BP: 128/68   Pulse: 70   Resp: 18   Temp: 98.6 °F (37 °C)   SpO2: 98%   Weight: 94.6 kg (208 lb 9.6 oz)   Height: 180.3 cm (70.98\")   PainSc: 0-No pain     Body mass index is 29.11 kg/m².     Physical Exam  Vitals reviewed.   Constitutional:       Appearance: Healthy appearance. Not in distress.   Eyes:      Pupils: Pupils are equal, round, and reactive to light.   HENT:    Mouth/Throat:      Pharynx: Oropharynx is clear.   Neck:      Thyroid: Thyroid normal.      Vascular: No JVR. JVD normal.   Pulmonary:      Effort: Pulmonary effort is normal.      Breath sounds: Normal breath sounds. No wheezing. No rhonchi. No rales.   Chest:      Chest wall: " Not tender to palpatation.   Cardiovascular:      PMI at left midclavicular line. Normal rate. Regular rhythm. Normal S1. Normal S2.      Murmurs: There is no murmur.      No gallop. No click. No rub.   Pulses:     Intact distal pulses.   Edema:     Peripheral edema absent.   Abdominal:      General: Bowel sounds are normal.      Palpations: Abdomen is soft.      Tenderness: There is no abdominal tenderness.   Musculoskeletal: Normal range of motion.         General: No tenderness.      Cervical back: Normal range of motion and neck supple. Skin:     General: Skin is warm and dry.   Neurological:      General: No focal deficit present.      Mental Status: Alert and oriented to person, place and time.          Diagnostic Data  Procedures    Assessment and Plan  Diagnoses and all orders for this visit:    Atrial fibrillation, chronic- no complaints refused blood thinner just ASA    Coronary artery disease involving native coronary artery of native heart without angina pectoris- s/P: CABG no chest pain, on ASA    Mixed hyperlipidemia- On Crestor 40 mg lipids are great LDL 58    Presence of cardiac pacemaker- This is working well        Essential hypertension, benign- BP is good 120.80 on Hydralazine 50 mg BID, Amlodipine 5 mg, Chlorthalidone 25 mg  -     chlorthalidone (HYGROTON) 25 MG tablet; Take 1 tablet by mouth Harriet    Other orders  -     amLODIPine (NORVASC) 5 MG tablet; Take 1 tablet by mouth Daily.  -     aspirin 81 MG EC tablet; Take 1 tablet by mouth Daily.  -     rosuvastatin (CRESTOR) 40 MG tablet; Take 1 tablet by mouth Daily.         Return in about 6 months (around 10/19/2023) for Recheck with Dr. Yee.    Fredy Yee MD  04/19/2023

## 2023-08-02 RX ORDER — LEVOTHYROXINE SODIUM 88 UG/1
88 TABLET ORAL DAILY
Qty: 30 TABLET | Refills: 0 | Status: SHIPPED | OUTPATIENT
Start: 2023-08-02

## 2023-08-02 RX ORDER — ALLOPURINOL 100 MG/1
TABLET ORAL
Qty: 60 TABLET | Refills: 0 | Status: SHIPPED | OUTPATIENT
Start: 2023-08-02

## 2023-08-02 RX ORDER — SUCRALFATE 1 G/1
TABLET ORAL
Qty: 90 TABLET | Refills: 0 | Status: SHIPPED | OUTPATIENT
Start: 2023-08-02

## 2023-08-08 ENCOUNTER — OFFICE VISIT (OUTPATIENT)
Dept: FAMILY MEDICINE CLINIC | Facility: CLINIC | Age: 83
End: 2023-08-08
Payer: MEDICARE

## 2023-08-08 VITALS
HEART RATE: 70 BPM | OXYGEN SATURATION: 98 % | WEIGHT: 197.4 LBS | BODY MASS INDEX: 27.64 KG/M2 | SYSTOLIC BLOOD PRESSURE: 110 MMHG | DIASTOLIC BLOOD PRESSURE: 70 MMHG | HEIGHT: 71 IN

## 2023-08-08 DIAGNOSIS — E78.2 MIXED HYPERLIPIDEMIA: ICD-10-CM

## 2023-08-08 DIAGNOSIS — K21.9 GASTROESOPHAGEAL REFLUX DISEASE, UNSPECIFIED WHETHER ESOPHAGITIS PRESENT: ICD-10-CM

## 2023-08-08 DIAGNOSIS — E03.9 PRIMARY HYPOTHYROIDISM: ICD-10-CM

## 2023-08-08 DIAGNOSIS — I10 ESSENTIAL HYPERTENSION: ICD-10-CM

## 2023-08-08 DIAGNOSIS — M10.9 GOUT, UNSPECIFIED CAUSE, UNSPECIFIED CHRONICITY, UNSPECIFIED SITE: ICD-10-CM

## 2023-08-08 DIAGNOSIS — G56.91 NEUROPATHY, ARM, RIGHT: Primary | ICD-10-CM

## 2023-08-08 PROCEDURE — 3078F DIAST BP <80 MM HG: CPT | Performed by: PHYSICIAN ASSISTANT

## 2023-08-08 PROCEDURE — 3074F SYST BP LT 130 MM HG: CPT | Performed by: PHYSICIAN ASSISTANT

## 2023-08-08 PROCEDURE — 99214 OFFICE O/P EST MOD 30 MIN: CPT | Performed by: PHYSICIAN ASSISTANT

## 2023-08-08 RX ORDER — LEVOTHYROXINE SODIUM 88 UG/1
88 TABLET ORAL DAILY
Qty: 90 TABLET | Refills: 1 | Status: SHIPPED | OUTPATIENT
Start: 2023-08-08

## 2023-08-08 RX ORDER — ALLOPURINOL 100 MG/1
100 TABLET ORAL 2 TIMES DAILY
Qty: 180 TABLET | Refills: 1 | Status: SHIPPED | OUTPATIENT
Start: 2023-08-08

## 2023-08-08 RX ORDER — OMEPRAZOLE 40 MG/1
40 CAPSULE, DELAYED RELEASE ORAL DAILY
Qty: 90 CAPSULE | Refills: 1 | Status: SHIPPED | OUTPATIENT
Start: 2023-08-08

## 2023-08-08 RX ORDER — SUCRALFATE 1 G/1
TABLET ORAL
Qty: 270 TABLET | Refills: 1 | Status: SHIPPED | OUTPATIENT
Start: 2023-08-08

## 2023-08-08 NOTE — PROGRESS NOTES
"Chief Complaint  Med Refill and Numbness (Right arm.. couple months)    Subjective          Reg Guerra presents to [facility]  History of Present Illness  Patient in today for medication recheck and refills.  He is also here today for his labs.  States his blood pressure has been doing well.  Denies any chest pain or shortness of breath.  He continues to follow with cardiology for his medications.  He states he has been having some numbness into his right arm intermittently for the last several months that he describes as pins/needles- noted when doing things such as with eating.  No pain to his shoulder or pain into neck.  Denies any known injury to area.  Denies any swelling to right upper extremity.  He states he does follow with orthopedist for chronic lower back pain.  States the omeprazole continues to work well for his reflux symptoms and would like to continue.  He is also needing a refill on his sucralfate.  He states he continues to follow with urology for his prostate medication and recheck visits.   Hypertension  This is a chronic problem. Pertinent negatives include no chest pain, headaches, peripheral edema or shortness of breath. Current antihypertension treatment includes diuretics and calcium channel blockers.   Hyperlipidemia  This is a chronic problem. Pertinent negatives include no chest pain, myalgias or shortness of breath. Current antihyperlipidemic treatment includes statins.     Objective   Vital Signs:   /70   Pulse 70   Ht 180.3 cm (70.98\")   Wt 89.5 kg (197 lb 6.4 oz)   SpO2 98%   BMI 27.54 kg/mý     Body mass index is 27.54 kg/mý.    Review of Systems   Constitutional:  Negative for fatigue.   HENT:  Negative for congestion and sore throat.    Respiratory:  Negative for cough and shortness of breath.    Cardiovascular:  Negative for chest pain.   Gastrointestinal:  Negative for abdominal pain, diarrhea, nausea and vomiting.   Genitourinary:  " Negative for dysuria and frequency.   Musculoskeletal:  Positive for back pain. Negative for myalgias.   Neurological:  Positive for numbness. Negative for dizziness and headache.     Past History:  Medical History: has a past medical history of Acquired hypothyroidism, Allergic rhinitis due to pollen, Appendicitis, Atherosclerotic heart disease of native coronary artery with other forms of angina pectoris, Benign prostate hyperplasia, Bilateral cataracts, BMI 28.0-28.9,adult, Chronic atrial fibrillation, Chronic gout, unspecified, without tophus (tophi), Chronic gouty arthritis, Chronic low back pain, Diabetes, DJD (degenerative joint disease), Dyslipidemia, Essential hypertension, Gastroesophageal reflux disease without esophagitis, Hiatal hernia, History of rheumatic fever as a child, Hyperlipidemia, Knee injury, Mixed hyperlipidemia, Obesity, Peptic ulcer disease, and Presence of cardiac pacemaker.   Surgical History: has a past surgical history that includes Knee surgery (1960); Appendectomy (1957); Ventral hernia repair; Kidney stone surgery; Cardiac pacemaker placement; Nissen fundoplication; TURP / transurethral incision / drainage prostate; Mandible fracture surgery (2009); and Bypass Graft.   Family History: family history includes Diabetes in his sister; Heart attack in his sister; Hypertension in his sister; Pancreatic cancer in his father.   Social History: reports that he has never smoked. He has never used smokeless tobacco. He reports that he does not drink alcohol and does not use drugs.      Current Outpatient Medications:     allopurinol (ZYLOPRIM) 100 MG tablet, TAKE 1 TABLET BY MOUTH TWICE  DAILY, Disp: 60 tablet, Rfl: 0    amLODIPine (NORVASC) 5 MG tablet, Take 1 tablet by mouth Daily., Disp: 90 tablet, Rfl: 3    aspirin 81 MG EC tablet, Take 1 tablet by mouth Daily., Disp: 90 tablet, Rfl: 3    cetirizine (zyrTEC) 10 MG tablet, Take 1 tablet by mouth Daily., Disp: , Rfl:     chlorthalidone  (HYGROTON) 25 MG tablet, Take 1 tablet by mouth Daily., Disp: 90 tablet, Rfl: 3    finasteride (PROSCAR) 5 MG tablet, Take 1 tablet by mouth Daily., Disp: , Rfl:     fluticasone (FLONASE) 50 MCG/ACT nasal spray, 1-2 sprays into the nostril(s) as directed by provider Daily., Disp: , Rfl:     hydrALAZINE (APRESOLINE) 50 MG tablet, Take 1 tablet by mouth 2 (Two) Times a Day., Disp: 180 tablet, Rfl: 3    levothyroxine (SYNTHROID, LEVOTHROID) 88 MCG tablet, TAKE 1 TABLET BY MOUTH DAILY, Disp: 30 tablet, Rfl: 0    nitroglycerin (NITROSTAT) 0.4 MG SL tablet, Place 1 tablet under the tongue Every 5 (Five) Minutes As Needed for Chest Pain. Take no more than 3 doses in 15 minutes., Disp: , Rfl:     omeprazole (priLOSEC) 40 MG capsule, Take 1 capsule by mouth Daily. Before a meal, Disp: 90 capsule, Rfl: 1    rosuvastatin (CRESTOR) 40 MG tablet, Take 1 tablet by mouth Daily., Disp: 90 tablet, Rfl: 3    sucralfate (CARAFATE) 1 g tablet, TAKE 1 TABLET BY MOUTH 3 TIMES  DAILY ON AN EMPTY STOMACH 1 HOUR BEFORE MEALS, Disp: 90 tablet, Rfl: 0    tamsulosin (FLOMAX) 0.4 MG capsule 24 hr capsule, Take 1 capsule by mouth Daily. 1/2 HOUR FOLLOWING THE SAME MEAL EACH DAY, Disp: , Rfl:     TiZANidine (ZANAFLEX) 2 MG capsule, Take 1 capsule by mouth Every 8 (Eight) Hours As Needed for Muscle Spasms., Disp: , Rfl:     traMADol (ULTRAM) 50 MG tablet, Take 1 tablet by mouth Every 6 (Six) Hours As Needed for Moderate Pain., Disp: , Rfl:   Allergies: Patient has no known allergies.    Physical Exam  Constitutional:       Appearance: Normal appearance.   HENT:      Right Ear: Tympanic membrane normal.      Left Ear: Tympanic membrane normal.      Mouth/Throat:      Pharynx: Oropharynx is clear.   Eyes:      Conjunctiva/sclera: Conjunctivae normal.      Pupils: Pupils are equal, round, and reactive to light.   Cardiovascular:      Rate and Rhythm: Normal rate and regular rhythm.      Heart sounds: Normal heart sounds.   Pulmonary:      Effort:  Pulmonary effort is normal.      Breath sounds: Normal breath sounds.   Abdominal:      Palpations: Abdomen is soft.      Tenderness: There is no abdominal tenderness.   Musculoskeletal:      Comments: FROM of neck and FROM of right shoulder ;nontender to palpate these areas    Neurological:      Mental Status: He is alert and oriented to person, place, and time.   Psychiatric:         Mood and Affect: Mood normal.         Behavior: Behavior normal.           Assessment and Plan   Diagnoses and all orders for this visit:    1. Neuropathy, arm, right (Primary)  -     XR Spine Cervical 2 or 3 View  Will check xray of neck today and discussed may need to follow up with orthopedist.   2. Essential hypertension  -     CBC & Differential  -     Comprehensive Metabolic Panel  -     TSH  Continue current medication;;will check labs today. Encouraged healthy diet and exercise.   3. Mixed hyperlipidemia  -     Lipid Panel  Continue medication and f/up with cardiology as directed.   4. Gout, unspecified cause, unspecified chronicity, unspecified site  -     Uric Acid  Refilled allopurinol and will check uric acid level.   5. Primary hypothyroidism  Refilled levothyroxine. Will check  TSH with labs.   6. Gastroesophageal reflux disease, unspecified whether esophagitis present  Refilled omeprazole and sucralfate. He states doing well and does not feel needs to f/up with GI at this time. RTC prior to recheck as needed.           Follow Up   No follow-ups on file.  Patient was given instructions and counseling regarding his condition or for health maintenance advice. Please see specific information pulled into the AVS if appropriate.     Gloria Siegel PA-C

## 2023-08-09 DIAGNOSIS — D64.9 ANEMIA, UNSPECIFIED TYPE: Primary | ICD-10-CM

## 2023-08-09 LAB
ALBUMIN SERPL-MCNC: 4.4 G/DL (ref 3.7–4.7)
ALBUMIN/GLOB SERPL: 1.8 {RATIO} (ref 1.2–2.2)
ALP SERPL-CCNC: 87 IU/L (ref 44–121)
ALT SERPL-CCNC: 16 IU/L (ref 0–44)
AST SERPL-CCNC: 20 IU/L (ref 0–40)
BASOPHILS # BLD AUTO: 0.1 X10E3/UL (ref 0–0.2)
BASOPHILS NFR BLD AUTO: 1 %
BILIRUB SERPL-MCNC: 0.5 MG/DL (ref 0–1.2)
BUN SERPL-MCNC: 20 MG/DL (ref 8–27)
BUN/CREAT SERPL: 18 (ref 10–24)
CALCIUM SERPL-MCNC: 9.5 MG/DL (ref 8.6–10.2)
CHLORIDE SERPL-SCNC: 101 MMOL/L (ref 96–106)
CHOLEST SERPL-MCNC: 118 MG/DL (ref 100–199)
CO2 SERPL-SCNC: 22 MMOL/L (ref 20–29)
CREAT SERPL-MCNC: 1.14 MG/DL (ref 0.76–1.27)
EGFRCR SERPLBLD CKD-EPI 2021: 64 ML/MIN/1.73
EOSINOPHIL # BLD AUTO: 0.2 X10E3/UL (ref 0–0.4)
EOSINOPHIL NFR BLD AUTO: 3 %
ERYTHROCYTE [DISTWIDTH] IN BLOOD BY AUTOMATED COUNT: 14.8 % (ref 11.6–15.4)
GLOBULIN SER CALC-MCNC: 2.4 G/DL (ref 1.5–4.5)
GLUCOSE SERPL-MCNC: 100 MG/DL (ref 70–99)
HCT VFR BLD AUTO: 38.4 % (ref 37.5–51)
HDLC SERPL-MCNC: 42 MG/DL
HGB BLD-MCNC: 12.5 G/DL (ref 13–17.7)
IMM GRANULOCYTES # BLD AUTO: 0 X10E3/UL (ref 0–0.1)
IMM GRANULOCYTES NFR BLD AUTO: 1 %
LDLC SERPL CALC-MCNC: 56 MG/DL (ref 0–99)
LYMPHOCYTES # BLD AUTO: 1.6 X10E3/UL (ref 0.7–3.1)
LYMPHOCYTES NFR BLD AUTO: 20 %
MCH RBC QN AUTO: 28.8 PG (ref 26.6–33)
MCHC RBC AUTO-ENTMCNC: 32.6 G/DL (ref 31.5–35.7)
MCV RBC AUTO: 89 FL (ref 79–97)
MONOCYTES # BLD AUTO: 0.7 X10E3/UL (ref 0.1–0.9)
MONOCYTES NFR BLD AUTO: 8 %
NEUTROPHILS # BLD AUTO: 5.4 X10E3/UL (ref 1.4–7)
NEUTROPHILS NFR BLD AUTO: 67 %
PLATELET # BLD AUTO: 240 X10E3/UL (ref 150–450)
POTASSIUM SERPL-SCNC: 4 MMOL/L (ref 3.5–5.2)
PROT SERPL-MCNC: 6.8 G/DL (ref 6–8.5)
RBC # BLD AUTO: 4.34 X10E6/UL (ref 4.14–5.8)
SODIUM SERPL-SCNC: 138 MMOL/L (ref 134–144)
TRIGL SERPL-MCNC: 110 MG/DL (ref 0–149)
TSH SERPL DL<=0.005 MIU/L-ACNC: 1.09 UIU/ML (ref 0.45–4.5)
URATE SERPL-MCNC: 3.5 MG/DL (ref 3.8–8.4)
VLDLC SERPL CALC-MCNC: 20 MG/DL (ref 5–40)
WBC # BLD AUTO: 8 X10E3/UL (ref 3.4–10.8)

## 2023-08-16 ENCOUNTER — TELEPHONE (OUTPATIENT)
Dept: FAMILY MEDICINE CLINIC | Facility: CLINIC | Age: 83
End: 2023-08-16
Payer: MEDICARE

## 2023-08-16 DIAGNOSIS — M54.2 CERVICALGIA: Primary | ICD-10-CM

## 2023-08-16 NOTE — TELEPHONE ENCOUNTER
Caller: Reg Guerra    Relationship: Self    Best call back number: [phone]       Who are you requesting to speak with (clinical staff, provider,  specific staff member): WHOMEVER CALLED RECENTLY    Do you know the name of the person who called: REG    What was the call regarding: UNSURE/POSSIBLY X-RAY, BLOOD WORK RESULTS

## 2023-09-27 ENCOUNTER — LAB (OUTPATIENT)
Dept: FAMILY MEDICINE CLINIC | Facility: CLINIC | Age: 83
End: 2023-09-27
Payer: MEDICARE

## 2023-09-28 DIAGNOSIS — D64.9 ANEMIA, UNSPECIFIED TYPE: Primary | ICD-10-CM

## 2023-10-18 ENCOUNTER — OFFICE VISIT (OUTPATIENT)
Dept: FAMILY MEDICINE CLINIC | Facility: CLINIC | Age: 83
End: 2023-10-18
Payer: MEDICARE

## 2023-10-18 VITALS
DIASTOLIC BLOOD PRESSURE: 78 MMHG | OXYGEN SATURATION: 97 % | HEART RATE: 71 BPM | WEIGHT: 199.2 LBS | HEIGHT: 71 IN | BODY MASS INDEX: 27.89 KG/M2 | SYSTOLIC BLOOD PRESSURE: 142 MMHG

## 2023-10-18 DIAGNOSIS — M25.511 ACUTE PAIN OF RIGHT SHOULDER: Primary | ICD-10-CM

## 2023-10-18 PROCEDURE — 3077F SYST BP >= 140 MM HG: CPT | Performed by: INTERNAL MEDICINE

## 2023-10-18 PROCEDURE — 3078F DIAST BP <80 MM HG: CPT | Performed by: INTERNAL MEDICINE

## 2023-10-18 PROCEDURE — 1159F MED LIST DOCD IN RCRD: CPT | Performed by: INTERNAL MEDICINE

## 2023-10-18 PROCEDURE — 1160F RVW MEDS BY RX/DR IN RCRD: CPT | Performed by: INTERNAL MEDICINE

## 2023-10-18 PROCEDURE — 99213 OFFICE O/P EST LOW 20 MIN: CPT | Performed by: INTERNAL MEDICINE

## 2023-10-18 NOTE — PROGRESS NOTES
Office Note     Name: Reg Guerra    : 1940     MRN: [id number]     Chief Complaint    Shoulder pain    Subjective     History of Present Illness:  Reg Guerra is a 83 y.o. male who presents today for shoulder pain    Has been doing PT for neck pain and arm tingly  in the past    During the PT sesion the therapist hit a soft stop in her shoulder and that night it started getting sore. Now cannot lift the shoulder.    Has also been working on drywall and lifting around the house    Had an old rotator cuff injury several years ago requiring joint injection and PT through [facility]. Has also had scratchy throat yesterday and today, mild cough      Review of Systems:   Review of Systems    Past Medical History:   Past Medical History:   Diagnosis Date   • Acquired hypothyroidism    • Allergic rhinitis due to pollen    • Appendicitis    • Atherosclerotic heart disease of native coronary artery with other forms of angina pectoris    • Benign prostate hyperplasia    • Bilateral cataracts    • BMI 28.0-28.9,adult    • Chronic atrial fibrillation    • Chronic gout, unspecified, without tophus (tophi)    • Chronic gouty arthritis    • Chronic low back pain    • Diabetes    • DJD (degenerative joint disease)    • Dyslipidemia    • Essential hypertension    • Gastroesophageal reflux disease without esophagitis    • Hiatal hernia    • History of rheumatic fever as a child    • Hyperlipidemia    • Knee injury    • Mixed hyperlipidemia    • Obesity    • Peptic ulcer disease    • Presence of cardiac pacemaker        Past Surgical History:   Past Surgical History:   Procedure Laterality Date   • APPENDECTOMY     • BYPASS GRAFT     • CARDIAC PACEMAKER PLACEMENT      VVI   • KIDNEY STONE SURGERY      LITHOTRIPSY   • KNEE SURGERY     • MANDIBLE FRACTURE SURGERY      KICKED BY HORSE   • NISSEN FUNDOPLICATION     • TURP / TRANSURETHRAL INCISION / DRAINAGE PROSTATE     • VENTRAL HERNIA REPAIR         Family History:    Family History   Problem Relation Age of Onset   • Pancreatic cancer Father    • Heart attack Sister    • Diabetes Sister    • Hypertension Sister        Social History:   Social History     Socioeconomic History   • Marital status:    • Number of children: 2   • Highest education level: 12th grade   Tobacco Use   • Smoking status: Never   • Smokeless tobacco: Never   Vaping Use   • Vaping Use: Never used   Substance and Sexual Activity   • Alcohol use: Never   • Drug use: Never   • Sexual activity: Defer       Immunizations:   Immunization History   Administered Date(s) Administered   • COVID-19 (MODERNA) 1st,2nd,3rd Dose Monovalent 02/24/2021, 02/24/2021, 03/24/2021, 11/05/2021, 05/14/2022   • COVID-19 (MODERNA) BIVALENT 12+YRS 12/17/2022   • Flu Vaccine Quad PF >36MO 09/27/2012, 11/11/2019   • Fluzone High Dose =>65 Years ([employer] ONLY) 10/07/2016   • Fluzone High-Dose 65+yrs 10/07/2016, 10/16/2021, 11/02/2022   • Influenza, Unspecified 09/27/2012, 11/11/2019   • Pneumococcal Conjugate 13-Valent (PCV13) 10/08/2018   • Pneumococcal Polysaccharide (PPSV23) 11/11/2019        Medications:     Current Outpatient Medications:   •  allopurinol (ZYLOPRIM) 100 MG tablet, Take 1 tablet by mouth 2 (Two) Times a Day., Disp: 180 tablet, Rfl: 1  •  amLODIPine (NORVASC) 5 MG tablet, Take 1 tablet by mouth Daily., Disp: 90 tablet, Rfl: 3  •  aspirin 81 MG EC tablet, Take 1 tablet by mouth Daily., Disp: 90 tablet, Rfl: 3  •  cetirizine (zyrTEC) 10 MG tablet, Take 1 tablet by mouth Daily., Disp: , Rfl:   •  chlorthalidone (HYGROTON) 25 MG tablet, Take 1 tablet by mouth Daily., Disp: 90 tablet, Rfl: 3  •  finasteride (PROSCAR) 5 MG tablet, Take 1 tablet by mouth Daily., Disp: , Rfl:   •  fluticasone (FLONASE) 50 MCG/ACT nasal spray, 1-2 sprays into the nostril(s) as directed by provider Daily., Disp: , Rfl:   •  hydrALAZINE (APRESOLINE) 50 MG tablet, Take 1 tablet by mouth 2 (Two) Times a Day., Disp: 180 tablet, Rfl: 3  •   "levothyroxine (SYNTHROID, LEVOTHROID) 88 MCG tablet, Take 1 tablet by mouth Daily., Disp: 90 tablet, Rfl: 1  •  nitroglycerin (NITROSTAT) 0.4 MG SL tablet, Place 1 tablet under the tongue Every 5 (Five) Minutes As Needed for Chest Pain. Take no more than 3 doses in 15 minutes., Disp: , Rfl:   •  omeprazole (priLOSEC) 40 MG capsule, Take 1 capsule by mouth Daily. Before a meal, Disp: 90 capsule, Rfl: 1  •  rosuvastatin (CRESTOR) 40 MG tablet, Take 1 tablet by mouth Daily., Disp: 90 tablet, Rfl: 3  •  sucralfate (CARAFATE) 1 g tablet, Take one tablet by mouth 3 times daily on an empty stomach 1 hour before meals, Disp: 270 tablet, Rfl: 1  •  tamsulosin (FLOMAX) 0.4 MG capsule 24 hr capsule, Take 1 capsule by mouth Daily. 1/2 HOUR FOLLOWING THE SAME MEAL EACH DAY, Disp: , Rfl:   •  TiZANidine (ZANAFLEX) 2 MG capsule, Take 1 capsule by mouth Every 8 (Eight) Hours As Needed for Muscle Spasms., Disp: , Rfl:   •  traMADol (ULTRAM) 50 MG tablet, Take 1 tablet by mouth Every 6 (Six) Hours As Needed for Moderate Pain., Disp: , Rfl:     Allergies:   No Known Allergies    Objective     Vital Signs  /78   Pulse 71   Ht 180.3 cm (71\")   Wt 90.4 kg (199 lb 3.2 oz)   SpO2 97%   BMI 27.78 kg/m²   Estimated body mass index is 27.78 kg/m² as calculated from the following:    Height as of this encounter: 180.3 cm (71\").    Weight as of this encounter: 90.4 kg (199 lb 3.2 oz).          Physical Exam  Vitals and nursing note reviewed.   Constitutional:       Appearance: Normal appearance.   Cardiovascular:      Rate and Rhythm: Normal rate and regular rhythm.      Heart sounds: No murmur heard.     No friction rub. No gallop.   Pulmonary:      Effort: Pulmonary effort is normal.      Breath sounds: Normal breath sounds. No wheezing, rhonchi or rales.   Musculoskeletal:      Comments: Decreased ROM  Right shoudler, no crepitus   Neurological:      Mental Status: He is alert.          Procedures     Assessment and Plan     1. " Acute pain of right shoulder  (undiagnosed new problem with uncertain prognosis)    - XR Shoulder 2+ View Right; Future  - Ambulatory Referral to Orthopedic Surgery       Follow Up  Return if symptoms worsen or fail to improve.    Patient was advised to call the office or seek medical care if  any issues discussed during this visit worsen or persist or if new concerns arise        MD LOREN Razo PC [facility]  [address] [address] [phone]

## 2023-10-25 ENCOUNTER — OFFICE VISIT (OUTPATIENT)
Dept: CARDIOLOGY | Facility: CLINIC | Age: 83
End: 2023-10-25
Payer: MEDICARE

## 2023-10-25 VITALS
TEMPERATURE: 98 F | HEART RATE: 84 BPM | WEIGHT: 195 LBS | OXYGEN SATURATION: 99 % | RESPIRATION RATE: 18 BRPM | DIASTOLIC BLOOD PRESSURE: 80 MMHG | HEIGHT: 71 IN | BODY MASS INDEX: 27.3 KG/M2 | SYSTOLIC BLOOD PRESSURE: 136 MMHG

## 2023-10-25 DIAGNOSIS — I10 ESSENTIAL HYPERTENSION: ICD-10-CM

## 2023-10-25 DIAGNOSIS — I25.10 CORONARY ARTERY DISEASE INVOLVING NATIVE CORONARY ARTERY OF NATIVE HEART WITHOUT ANGINA PECTORIS: ICD-10-CM

## 2023-10-25 DIAGNOSIS — I10 ESSENTIAL HYPERTENSION, BENIGN: ICD-10-CM

## 2023-10-25 DIAGNOSIS — I48.20 ATRIAL FIBRILLATION, CHRONIC: Primary | ICD-10-CM

## 2023-10-25 DIAGNOSIS — Z95.0 PRESENCE OF CARDIAC PACEMAKER: ICD-10-CM

## 2023-10-25 DIAGNOSIS — I10 PRIMARY HYPERTENSION: ICD-10-CM

## 2023-10-25 PROCEDURE — 3079F DIAST BP 80-89 MM HG: CPT | Performed by: INTERNAL MEDICINE

## 2023-10-25 PROCEDURE — 1159F MED LIST DOCD IN RCRD: CPT | Performed by: INTERNAL MEDICINE

## 2023-10-25 PROCEDURE — 1160F RVW MEDS BY RX/DR IN RCRD: CPT | Performed by: INTERNAL MEDICINE

## 2023-10-25 PROCEDURE — 99214 OFFICE O/P EST MOD 30 MIN: CPT | Performed by: INTERNAL MEDICINE

## 2023-10-25 PROCEDURE — 93000 ELECTROCARDIOGRAM COMPLETE: CPT | Performed by: INTERNAL MEDICINE

## 2023-10-25 PROCEDURE — 3075F SYST BP GE 130 - 139MM HG: CPT | Performed by: INTERNAL MEDICINE

## 2023-10-25 RX ORDER — HYDRALAZINE HYDROCHLORIDE 50 MG/1
50 TABLET, FILM COATED ORAL 2 TIMES DAILY
Qty: 180 TABLET | Refills: 3 | Status: SHIPPED | OUTPATIENT
Start: 2023-10-25

## 2023-10-25 RX ORDER — ROSUVASTATIN CALCIUM 40 MG/1
40 TABLET, COATED ORAL DAILY
Qty: 90 TABLET | Refills: 3 | Status: SHIPPED | OUTPATIENT
Start: 2023-10-25

## 2023-10-25 RX ORDER — CHLORTHALIDONE 25 MG/1
25 TABLET ORAL DAILY
Qty: 90 TABLET | Refills: 3 | Status: SHIPPED | OUTPATIENT
Start: 2023-10-25

## 2023-10-25 RX ORDER — AMLODIPINE BESYLATE 5 MG/1
5 TABLET ORAL DAILY
Qty: 90 TABLET | Refills: 3 | Status: SHIPPED | OUTPATIENT
Start: 2023-10-25

## 2023-10-25 NOTE — PROGRESS NOTES
[facility] [place]  [facility] CARDIOLOGY  [address] [place] [address]  Dept: [phone]  Dept Fax: [phone]    Reg Guerra  1940    Follow Up Office Visit Note    History of Present Illness:  Reg Guerra is a 83 y.o. male who presents to the clinic for Follow-up.CAD- He is s/p CABG in 2008, and stress nallely in 2016 , with mild ischemia,denies any chest pain, no SOB, has some cough, no edema, EKG paced ventricular rhythm, he has refused blood thinner due to nose bleeding and he is willing now to have a watchman has been just on Asa , will set him to see Dr Rodriguez BP is 130.80    The following portions of the patient's history were reviewed and updated as appropriate: allergies, current medications, past family history, past medical history, past social history, past surgical history, and problem list.    Medications:  allopurinol  amLODIPine  aspirin  chlorthalidone  finasteride  hydrALAZINE  levothyroxine  nitroglycerin  omeprazole  rosuvastatin  sucralfate  tamsulosin capsule    Subjective  No Known Allergies     Past Medical History:   Diagnosis Date    Acquired hypothyroidism     Allergic rhinitis due to pollen     Appendicitis     Atherosclerotic heart disease of native coronary artery with other forms of angina pectoris     Benign prostate hyperplasia     Bilateral cataracts     BMI 28.0-28.9,adult     Chronic atrial fibrillation     Chronic gout, unspecified, without tophus (tophi)     Chronic gouty arthritis     Chronic low back pain     Diabetes     DJD (degenerative joint disease)     Dyslipidemia     Essential hypertension     Gastroesophageal reflux disease without esophagitis     Hiatal hernia     History of rheumatic fever as a child     Hyperlipidemia     Knee injury     Mixed hyperlipidemia     Obesity     Peptic ulcer disease     Presence of cardiac pacemaker        Past Surgical History:   Procedure Laterality Date    APPENDECTOMY  1957    BYPASS GRAFT       "CARDIAC PACEMAKER PLACEMENT      VVI    KIDNEY STONE SURGERY      LITHOTRIPSY    KNEE SURGERY  1960    MANDIBLE FRACTURE SURGERY  2009    KICKED BY HORSE    NISSEN FUNDOPLICATION      TURP / TRANSURETHRAL INCISION / DRAINAGE PROSTATE      VENTRAL HERNIA REPAIR         Family History   Problem Relation Age of Onset    Pancreatic cancer Father     Heart attack Sister     Diabetes Sister     Hypertension Sister         Social History     Socioeconomic History    Marital status:     Number of children: 2    Highest education level: 12th grade   Tobacco Use    Smoking status: Never    Smokeless tobacco: Never   Vaping Use    Vaping Use: Never used   Substance and Sexual Activity    Alcohol use: Never    Drug use: Never    Sexual activity: Defer       Review of Systems   Constitutional: Negative.    HENT: Negative.     Respiratory: Negative.     Cardiovascular: Negative.    Endocrine: Negative.    Genitourinary: Negative.    Musculoskeletal: Negative.    Skin: Negative.    Allergic/Immunologic: Negative.    Neurological: Negative.    Hematological: Negative.    Psychiatric/Behavioral: Negative.         Cardiovascular Procedures    ECHO/MUGA:  STRESS TESTS:   CARDIAC CATH:   DEVICES:   HOLTER:   CT/MRI:   VASCULAR:   CARDIOTHORACIC:     Objective  Vitals:    10/25/23 0917   BP: 136/80   BP Location: Right arm   Patient Position: Lying   Cuff Size: Adult   Pulse: 84   Resp: 18   Temp: 98 °F (36.7 °C)   TempSrc: Infrared   SpO2: 99%   Weight: 88.5 kg (195 lb)   Height: 180.3 cm (71\")   PainSc: 0-No pain     Body mass index is 27.2 kg/m².     Physical Exam  Vitals reviewed.   Constitutional:       Appearance: Healthy appearance. Not in distress.   Eyes:      Pupils: Pupils are equal, round, and reactive to light.   HENT:    Mouth/Throat:      Pharynx: Oropharynx is clear.   Neck:      Thyroid: Thyroid normal.      Vascular: No JVR. JVD normal.   Pulmonary:      Effort: Pulmonary effort is normal.      Breath sounds: " Normal breath sounds. No wheezing. No rhonchi. No rales.   Chest:      Chest wall: Not tender to palpatation.   Cardiovascular:      PMI at left midclavicular line. Normal rate. Irregularly irregular rhythm. Normal S1. Normal S2.       Murmurs: There is no murmur.      No gallop.  No click. No rub.   Pulses:     Intact distal pulses.      Carotid: 3+ bilaterally.     Radial: 3+ bilaterally.     Femoral: 3+ bilaterally.     Dorsalis pedis: 3+ bilaterally.     Posterior tibial: 3+ bilaterally.  Edema:     Peripheral edema absent.   Abdominal:      General: Bowel sounds are normal.      Palpations: Abdomen is soft.      Tenderness: There is no abdominal tenderness.   Musculoskeletal: Normal range of motion.         General: No tenderness.      Cervical back: Normal range of motion and neck supple. Skin:     General: Skin is warm and dry.   Neurological:      General: No focal deficit present.      Mental Status: Alert and oriented to person, place and time.        Diagnostic Data    ECG 12 Lead    Date/Time: 10/25/2023 9:58 AM  Performed by: Fredy Yee MD    Authorized by: Fredy Yee MD  Comparison: compared with previous ECG from 10/19/2022  Similar to previous ECG  Rhythm: atrial fibrillation and paced  Rate: normal  BPM: 71  QRS axis: normal  Pacing: ventricular paced rhythm  Clinical impression: abnormal EKG        Assessment and Plan  Diagnoses and all orders for this visit:    Atrial fibrillation, chronic- rate control on No meds only ASA, he now agrees for watchman will sent him to see Dr Rodriguez  -     Ambulatory Referral to Cardiac Electrophysiology    Presence of cardiac pacemaker- This is working well     Primary hypertension- BP is good 130.80 on Hydralazine 50 mg bid, Amlodipine 5 mg and Chlorthalidone 25 mg    Coronary artery disease involving native coronary artery of native heart without angina pectoris- s.p CABG in 2008 and  stress nuclear with mild ischemia on lateral wall  in  2016- No chest pain      Other orders  -     amLODIPine (NORVASC) 5 MG tablet; Take 1 tablet by mouth Daily.  -     rosuvastatin (CRESTOR) 40 MG tablet; Take 1 tablet by mouth Daily.         Return in about 6 months (around 4/25/2024) for Recheck with Dr. Yee.    Fredy Yee MD  10/25/2023

## 2023-11-27 ENCOUNTER — OFFICE VISIT (OUTPATIENT)
Dept: CARDIOLOGY | Facility: CLINIC | Age: 83
End: 2023-11-27
Payer: MEDICARE

## 2023-11-27 VITALS
WEIGHT: 191.6 LBS | OXYGEN SATURATION: 95 % | BODY MASS INDEX: 26.82 KG/M2 | HEIGHT: 71 IN | DIASTOLIC BLOOD PRESSURE: 66 MMHG | HEART RATE: 70 BPM | SYSTOLIC BLOOD PRESSURE: 118 MMHG

## 2023-11-27 DIAGNOSIS — R04.0 BLEEDING NOSE: ICD-10-CM

## 2023-11-27 DIAGNOSIS — Z95.0 PRESENCE OF CARDIAC PACEMAKER: ICD-10-CM

## 2023-11-27 DIAGNOSIS — I48.20 ATRIAL FIBRILLATION, CHRONIC: Primary | ICD-10-CM

## 2023-11-27 DIAGNOSIS — I10 PRIMARY HYPERTENSION: ICD-10-CM

## 2023-11-27 PROCEDURE — 99213 OFFICE O/P EST LOW 20 MIN: CPT | Performed by: PHYSICIAN ASSISTANT

## 2023-11-27 PROCEDURE — 3074F SYST BP LT 130 MM HG: CPT | Performed by: PHYSICIAN ASSISTANT

## 2023-11-27 PROCEDURE — 3078F DIAST BP <80 MM HG: CPT | Performed by: PHYSICIAN ASSISTANT

## 2023-11-27 PROCEDURE — 93000 ELECTROCARDIOGRAM COMPLETE: CPT | Performed by: PHYSICIAN ASSISTANT

## 2023-11-27 NOTE — PROGRESS NOTES
Cardiac Electrophysiology Outpatient Consult Note            [facility]    Consult Note     Reg Guerra  [id number]  11/27/2023  [phone]     Primary Care Physician: Gloria Siegel PA-C    Referred By: Fredy Yee, *    Subjective     Chief Complaint:   Diagnoses and all orders for this visit:    1. Atrial fibrillation, chronic (Primary)  -     ECG 12 Lead    2. Bleeding nose    3. Presence of cardiac pacemaker    4. Primary hypertension      Chief Complaint   Patient presents with    Atrial Fibrillation       History of Present Illness:   Reg Guerra is a 83 y.o. male who presents to my electrophysiology clinic for evaluation of this is an 83-year-old male with a history of coronary artery bypass who states he has had atrial fibrillation for approximately 30 years.  He had a remote AV node ablation and single-chamber permanent pacemaker implantation.  He is unable to tolerate anticoagulation due to recurrent nosebleeding.  States he has been at high admitted to the hospital on 2 separate occasions for bleeding.  He has seen an ENT surgeon who reportedly found no significant abnormalities.  He has significant stroke risk, [id number] 5.    Past Medical History:   Patient Active Problem List    Diagnosis Date Noted    Atrial fibrillation, chronic 04/27/2021     Priority: High     Note Last Updated: 11/27/2023     [id number] 5   remote AV node ablation with pacemaker implantation      Coronary artery disease involving native coronary artery of native heart 04/27/2021     Priority: Medium    Presence of cardiac pacemaker 04/27/2021     Priority: Medium     Note Last Updated: 11/27/2023     [employer] single-chamber permanent pacemaker generator change, 3/25/2020      Primary hypertension 04/27/2021     Priority: Low    Bleeding nose 11/27/2023    Gastroesophageal reflux disease 07/05/2022    Chronic pain of right knee 07/05/2022    Primary  hypothyroidism 07/05/2022    Chronic gout without tophus 07/05/2022    Mixed hyperlipidemia 04/27/2021       Past Surgical History:   Past Surgical History:   Procedure Laterality Date    APPENDECTOMY  1957    BYPASS GRAFT      CARDIAC PACEMAKER PLACEMENT      VVI    KIDNEY STONE SURGERY      LITHOTRIPSY    KNEE SURGERY  1960    MANDIBLE FRACTURE SURGERY  2009    KICKED BY HORSE    NISSEN FUNDOPLICATION      TURP / TRANSURETHRAL INCISION / DRAINAGE PROSTATE      VENTRAL HERNIA REPAIR         Social History:   Social History     Socioeconomic History    Marital status:     Number of children: 2    Highest education level: 12th grade   Tobacco Use    Smoking status: Never    Smokeless tobacco: Never   Vaping Use    Vaping Use: Never used   Substance and Sexual Activity    Alcohol use: Never    Drug use: Never    Sexual activity: Defer       Medications:     Current Outpatient Medications:     allopurinol (ZYLOPRIM) 100 MG tablet, Take 1 tablet by mouth 2 (Two) Times a Day., Disp: 180 tablet, Rfl: 1    amLODIPine (NORVASC) 5 MG tablet, Take 1 tablet by mouth Daily., Disp: 90 tablet, Rfl: 3    aspirin 81 MG EC tablet, Take 1 tablet by mouth Daily. (Patient taking differently: Take 1 tablet by mouth Every Other Day.), Disp: 90 tablet, Rfl: 3    chlorthalidone (HYGROTON) 25 MG tablet, Take 1 tablet by mouth Daily., Disp: 90 tablet, Rfl: 3    finasteride (PROSCAR) 5 MG tablet, Take 1 tablet by mouth Daily., Disp: , Rfl:     hydrALAZINE (APRESOLINE) 50 MG tablet, Take 1 tablet by mouth 2 (Two) Times a Day., Disp: 180 tablet, Rfl: 3    levothyroxine (SYNTHROID, LEVOTHROID) 88 MCG tablet, Take 1 tablet by mouth Daily., Disp: 90 tablet, Rfl: 1    omeprazole (priLOSEC) 40 MG capsule, Take 1 capsule by mouth Daily. Before a meal, Disp: 90 capsule, Rfl: 1    rosuvastatin (CRESTOR) 40 MG tablet, Take 1 tablet by mouth Daily., Disp: 90 tablet, Rfl: 3    sucralfate (CARAFATE) 1 g tablet, Take one tablet by mouth 3 times daily  "on an empty stomach 1 hour before meals, Disp: 270 tablet, Rfl: 1    tamsulosin (FLOMAX) 0.4 MG capsule 24 hr capsule, Take 1 capsule by mouth Daily. 1/2 HOUR FOLLOWING THE SAME MEAL EACH DAY, Disp: , Rfl:     nitroglycerin (NITROSTAT) 0.4 MG SL tablet, Place 1 tablet under the tongue Every 5 (Five) Minutes As Needed for Chest Pain. Take no more than 3 doses in 15 minutes. (Patient not taking: Reported on 11/27/2023), Disp: , Rfl:     Allergies:   No Known Allergies    Objective   Vital Signs:   Vitals:    11/27/23 0857   BP: 118/66   BP Location: Left arm   Patient Position: Sitting   Cuff Size: Adult   Pulse: 70   SpO2: 95%   Weight: 86.9 kg (191 lb 9.6 oz)   Height: 180.3 cm (70.98\")       PHYSICAL EXAM    Neck: no adenopathy, no carotid bruit, no JVD, and thyroid: not enlarged  Lungs: clear to auscultation bilaterally and no rhonchi or crackles\", ' symmetric  Heart: regular rate and rhythm, S1, S2 normal, no murmur, click, rub or gallop  Abdomen: Soft, non-tender, bowel sounds normal,  no organomegaly  Extremities: extremities normal, atraumatic, no cyanosis or edema      Lab Results   Component Value Date    GLUCOSE 100 (H) 08/08/2023    CALCIUM 9.5 08/08/2023     08/08/2023    K 4.0 08/08/2023    CO2 22 08/08/2023     08/08/2023    BUN 20 08/08/2023    CREATININE 1.14 08/08/2023    BCR 18 08/08/2023     Lab Results   Component Value Date    WBC 8.5 09/27/2023    HGB 12.0 (L) 09/27/2023    HCT 37.7 09/27/2023    MCV 91 09/27/2023     09/27/2023     No results found for: \"INR\", \"PROTIME\"  Lab Results   Component Value Date    TSH 1.090 08/08/2023       Cardiac Testing:                                        HAS-Bled Score for Major Bleeding Risk       Question  Yes  No    Hypertension History - BP Systolic >160 mmHg, no treatment x    2.   Renal Disease - Creatinine higher than 2.6 mg/dL (200mmol/L)     3.   Liver Disease - Bilirubin higher 2x or AST/ALT/AP higher 3x normal     4.   Stroke " History      5.   History of major bleeding or predisposition  x    6.   Labile INR - unstable/high INRs, time in Therapeutic Range <60%     7.   Age > 65  x    8.   Under medication that predisposes to bleeding  x    9.   Alcohol or drug usage history - more than 7 drinks per week              Score Major bleeding risk   0 1.10%   1 3.40%   2 4.10%   3 5.80%   4 8.90%   5 9.10%   >6 higher %           I personally viewed and interpreted the patient's EKG/Telemetry/lab data      ECG 12 Lead    Date/Time: 11/27/2023 9:35 AM  Performed by: Kurtis Lawson PA    Authorized by: Kurtis Lawson PA  Rhythm: paced  Rate: normal  BPM: 70  Pacing: ventricular paced rhythm  Clinical impression: abnormal EKG        Tobacco Cessation: N/A  Obstructive Sleep Apnea Screening: N/A    Assessment & Plan    Diagnoses and all orders for this visit:    1. Atrial fibrillation, chronic (Primary)  -     ECG 12 Lead    2. Bleeding nose    3. Presence of cardiac pacemaker    4. Primary hypertension         Diagnosis Plan   1. Atrial fibrillation, chronic  History of AV node ablation and single-chamber permanent pacemaker implantation.  High [id number] score.  With bleeding risk of 8.9 %/year.  Good candidate for Watchman left atrial appendage occlusion.  I have quoted him a less than 1% complication rate and he wishes to proceed      2. Bleeding nose  Good candidate for left atrial appendage occlusion.  Unable to tolerate chronic anticoagulation on multiple attempts      3. Presence of cardiac pacemaker  Normal functioning single-chamber permanent pacemaker followed by primary cardiologist      4. Primary hypertension  Well-managed on current medical regimen        Body mass index is 26.73 kg/m².      Follow Up:       Thank you for allowing me to participate in the care of your patient. Please to not hesitate to contact me with additional questions or concerns.        Electronically signed by ROMY Huitron, 11/27/23, 9:35  AM EST.

## 2023-11-27 NOTE — H&P (VIEW-ONLY)
Cardiac Electrophysiology Outpatient Consult Note            [facility]    Consult Note     Reg Guerra  [id number]  11/27/2023  [phone]     Primary Care Physician: Gloria Siegel PA-C    Referred By: Fredy Yee, *    Subjective     Chief Complaint:   Diagnoses and all orders for this visit:    1. Atrial fibrillation, chronic (Primary)  -     ECG 12 Lead    2. Bleeding nose    3. Presence of cardiac pacemaker    4. Primary hypertension      Chief Complaint   Patient presents with    Atrial Fibrillation       History of Present Illness:   Reg Guerra is a 83 y.o. male who presents to my electrophysiology clinic for evaluation of this is an 83-year-old male with a history of coronary artery bypass who states he has had atrial fibrillation for approximately 30 years.  He had a remote AV node ablation and single-chamber permanent pacemaker implantation.  He is unable to tolerate anticoagulation due to recurrent nosebleeding.  States he has been at high admitted to the hospital on 2 separate occasions for bleeding.  He has seen an ENT surgeon who reportedly found no significant abnormalities.  He has significant stroke risk, [id number] 5.    Past Medical History:   Patient Active Problem List    Diagnosis Date Noted    Atrial fibrillation, chronic 04/27/2021     Priority: High     Note Last Updated: 11/27/2023     [id number] 5   remote AV node ablation with pacemaker implantation      Coronary artery disease involving native coronary artery of native heart 04/27/2021     Priority: Medium    Presence of cardiac pacemaker 04/27/2021     Priority: Medium     Note Last Updated: 11/27/2023     [employer] single-chamber permanent pacemaker generator change, 3/25/2020      Primary hypertension 04/27/2021     Priority: Low    Bleeding nose 11/27/2023    Gastroesophageal reflux disease 07/05/2022    Chronic pain of right knee 07/05/2022    Primary  hypothyroidism 07/05/2022    Chronic gout without tophus 07/05/2022    Mixed hyperlipidemia 04/27/2021       Past Surgical History:   Past Surgical History:   Procedure Laterality Date    APPENDECTOMY  1957    BYPASS GRAFT      CARDIAC PACEMAKER PLACEMENT      VVI    KIDNEY STONE SURGERY      LITHOTRIPSY    KNEE SURGERY  1960    MANDIBLE FRACTURE SURGERY  2009    KICKED BY HORSE    NISSEN FUNDOPLICATION      TURP / TRANSURETHRAL INCISION / DRAINAGE PROSTATE      VENTRAL HERNIA REPAIR         Social History:   Social History     Socioeconomic History    Marital status:     Number of children: 2    Highest education level: 12th grade   Tobacco Use    Smoking status: Never    Smokeless tobacco: Never   Vaping Use    Vaping Use: Never used   Substance and Sexual Activity    Alcohol use: Never    Drug use: Never    Sexual activity: Defer       Medications:     Current Outpatient Medications:     allopurinol (ZYLOPRIM) 100 MG tablet, Take 1 tablet by mouth 2 (Two) Times a Day., Disp: 180 tablet, Rfl: 1    amLODIPine (NORVASC) 5 MG tablet, Take 1 tablet by mouth Daily., Disp: 90 tablet, Rfl: 3    aspirin 81 MG EC tablet, Take 1 tablet by mouth Daily. (Patient taking differently: Take 1 tablet by mouth Every Other Day.), Disp: 90 tablet, Rfl: 3    chlorthalidone (HYGROTON) 25 MG tablet, Take 1 tablet by mouth Daily., Disp: 90 tablet, Rfl: 3    finasteride (PROSCAR) 5 MG tablet, Take 1 tablet by mouth Daily., Disp: , Rfl:     hydrALAZINE (APRESOLINE) 50 MG tablet, Take 1 tablet by mouth 2 (Two) Times a Day., Disp: 180 tablet, Rfl: 3    levothyroxine (SYNTHROID, LEVOTHROID) 88 MCG tablet, Take 1 tablet by mouth Daily., Disp: 90 tablet, Rfl: 1    omeprazole (priLOSEC) 40 MG capsule, Take 1 capsule by mouth Daily. Before a meal, Disp: 90 capsule, Rfl: 1    rosuvastatin (CRESTOR) 40 MG tablet, Take 1 tablet by mouth Daily., Disp: 90 tablet, Rfl: 3    sucralfate (CARAFATE) 1 g tablet, Take one tablet by mouth 3 times daily  "on an empty stomach 1 hour before meals, Disp: 270 tablet, Rfl: 1    tamsulosin (FLOMAX) 0.4 MG capsule 24 hr capsule, Take 1 capsule by mouth Daily. 1/2 HOUR FOLLOWING THE SAME MEAL EACH DAY, Disp: , Rfl:     nitroglycerin (NITROSTAT) 0.4 MG SL tablet, Place 1 tablet under the tongue Every 5 (Five) Minutes As Needed for Chest Pain. Take no more than 3 doses in 15 minutes. (Patient not taking: Reported on 11/27/2023), Disp: , Rfl:     Allergies:   No Known Allergies    Objective   Vital Signs:   Vitals:    11/27/23 0857   BP: 118/66   BP Location: Left arm   Patient Position: Sitting   Cuff Size: Adult   Pulse: 70   SpO2: 95%   Weight: 86.9 kg (191 lb 9.6 oz)   Height: 180.3 cm (70.98\")       PHYSICAL EXAM    Neck: no adenopathy, no carotid bruit, no JVD, and thyroid: not enlarged  Lungs: clear to auscultation bilaterally and no rhonchi or crackles\", ' symmetric  Heart: regular rate and rhythm, S1, S2 normal, no murmur, click, rub or gallop  Abdomen: Soft, non-tender, bowel sounds normal,  no organomegaly  Extremities: extremities normal, atraumatic, no cyanosis or edema      Lab Results   Component Value Date    GLUCOSE 100 (H) 08/08/2023    CALCIUM 9.5 08/08/2023     08/08/2023    K 4.0 08/08/2023    CO2 22 08/08/2023     08/08/2023    BUN 20 08/08/2023    CREATININE 1.14 08/08/2023    BCR 18 08/08/2023     Lab Results   Component Value Date    WBC 8.5 09/27/2023    HGB 12.0 (L) 09/27/2023    HCT 37.7 09/27/2023    MCV 91 09/27/2023     09/27/2023     No results found for: \"INR\", \"PROTIME\"  Lab Results   Component Value Date    TSH 1.090 08/08/2023       Cardiac Testing:                                        HAS-Bled Score for Major Bleeding Risk       Question  Yes  No    Hypertension History - BP Systolic >160 mmHg, no treatment x    2.   Renal Disease - Creatinine higher than 2.6 mg/dL (200mmol/L)     3.   Liver Disease - Bilirubin higher 2x or AST/ALT/AP higher 3x normal     4.   Stroke " History      5.   History of major bleeding or predisposition  x    6.   Labile INR - unstable/high INRs, time in Therapeutic Range <60%     7.   Age > 65  x    8.   Under medication that predisposes to bleeding  x    9.   Alcohol or drug usage history - more than 7 drinks per week              Score Major bleeding risk   0 1.10%   1 3.40%   2 4.10%   3 5.80%   4 8.90%   5 9.10%   >6 higher %           I personally viewed and interpreted the patient's EKG/Telemetry/lab data      ECG 12 Lead    Date/Time: 11/27/2023 9:35 AM  Performed by: Kurtis Lawson PA    Authorized by: Kurtis Lawson PA  Rhythm: paced  Rate: normal  BPM: 70  Pacing: ventricular paced rhythm  Clinical impression: abnormal EKG        Tobacco Cessation: N/A  Obstructive Sleep Apnea Screening: N/A    Assessment & Plan    Diagnoses and all orders for this visit:    1. Atrial fibrillation, chronic (Primary)  -     ECG 12 Lead    2. Bleeding nose    3. Presence of cardiac pacemaker    4. Primary hypertension         Diagnosis Plan   1. Atrial fibrillation, chronic  History of AV node ablation and single-chamber permanent pacemaker implantation.  High [id number] score.  With bleeding risk of 8.9 %/year.  Good candidate for Watchman left atrial appendage occlusion.  I have quoted him a less than 1% complication rate and he wishes to proceed      2. Bleeding nose  Good candidate for left atrial appendage occlusion.  Unable to tolerate chronic anticoagulation on multiple attempts      3. Presence of cardiac pacemaker  Normal functioning single-chamber permanent pacemaker followed by primary cardiologist      4. Primary hypertension  Well-managed on current medical regimen        Body mass index is 26.73 kg/m².      Follow Up:       Thank you for allowing me to participate in the care of your patient. Please to not hesitate to contact me with additional questions or concerns.        Electronically signed by ROMY Huitron, 11/27/23, 9:35  AM EST.

## 2023-12-01 ENCOUNTER — TELEPHONE (OUTPATIENT)
Dept: CARDIOLOGY | Facility: CLINIC | Age: 83
End: 2023-12-01
Payer: MEDICARE

## 2023-12-01 DIAGNOSIS — I48.20 ATRIAL FIBRILLATION, CHRONIC: ICD-10-CM

## 2023-12-01 DIAGNOSIS — I48.19 ATRIAL FIBRILLATION, PERSISTENT: ICD-10-CM

## 2023-12-01 DIAGNOSIS — R04.0 BLEEDING NOSE: Primary | ICD-10-CM

## 2023-12-01 RX ORDER — CLOPIDOGREL BISULFATE 75 MG/1
75 TABLET ORAL DAILY
Qty: 30 TABLET | Refills: 6 | Status: SHIPPED | OUTPATIENT
Start: 2023-12-01

## 2023-12-01 NOTE — TELEPHONE ENCOUNTER
Left message for patient to call me.  Med sent to the pharmacy    Reyna Montero RN    ----- Message from Vicente Rodriguez DO sent at 11/29/2023  7:25 PM EST -----  Regarding: RE: 12/13 LAAO  Now pls  ----- Message -----  From: Reyna Montero RN  Sent: 11/29/2023   4:35 PM EST  To: Vicente Rodriguez DO; TEODORA Jasmine; #  Subject: 12/13 LAAO                                       Patient scheduled for Watchman on 12/13    Will the anticoag plan be DAPT?  On ASA now.  If so, when does he need to start plavix?    Thanks

## 2023-12-05 ENCOUNTER — PREP FOR SURGERY (OUTPATIENT)
Dept: OTHER | Facility: HOSPITAL | Age: 83
End: 2023-12-05
Payer: MEDICARE

## 2023-12-05 DIAGNOSIS — I48.20 ATRIAL FIBRILLATION, CHRONIC: Primary | ICD-10-CM

## 2023-12-05 RX ORDER — CEFAZOLIN SODIUM 2 G/100ML
2 INJECTION, SOLUTION INTRAVENOUS ONCE
OUTPATIENT
Start: 2023-12-05 | End: 2023-12-05

## 2023-12-05 RX ORDER — ONDANSETRON 2 MG/ML
4 INJECTION INTRAMUSCULAR; INTRAVENOUS EVERY 6 HOURS PRN
OUTPATIENT
Start: 2023-12-05

## 2023-12-05 RX ORDER — ACETAMINOPHEN 325 MG/1
650 TABLET ORAL EVERY 4 HOURS PRN
OUTPATIENT
Start: 2023-12-05

## 2023-12-05 RX ORDER — SODIUM CHLORIDE 0.9 % (FLUSH) 0.9 %
3 SYRINGE (ML) INJECTION EVERY 12 HOURS SCHEDULED
OUTPATIENT
Start: 2023-12-05

## 2023-12-05 RX ORDER — SODIUM CHLORIDE 0.9 % (FLUSH) 0.9 %
10 SYRINGE (ML) INJECTION AS NEEDED
OUTPATIENT
Start: 2023-12-05

## 2023-12-05 RX ORDER — NITROGLYCERIN 0.4 MG/1
0.4 TABLET SUBLINGUAL
OUTPATIENT
Start: 2023-12-05

## 2023-12-05 RX ORDER — SODIUM CHLORIDE 9 MG/ML
40 INJECTION, SOLUTION INTRAVENOUS AS NEEDED
OUTPATIENT
Start: 2023-12-05

## 2023-12-06 ENCOUNTER — DOCUMENTATION (OUTPATIENT)
Dept: CARDIOLOGY | Facility: HOSPITAL | Age: 83
End: 2023-12-06
Payer: MEDICARE

## 2023-12-06 ENCOUNTER — HOSPITAL ENCOUNTER (OUTPATIENT)
Dept: CT IMAGING | Facility: HOSPITAL | Age: 83
Discharge: HOME | End: 2023-12-06
Payer: MEDICARE

## 2023-12-06 ENCOUNTER — PRE-ADMISSION TESTING (OUTPATIENT)
Dept: PREADMISSION TESTING | Facility: HOSPITAL | Age: 83
End: 2023-12-06
Payer: MEDICARE

## 2023-12-06 DIAGNOSIS — I48.19 ATRIAL FIBRILLATION, PERSISTENT: ICD-10-CM

## 2023-12-06 DIAGNOSIS — R04.0 BLEEDING NOSE: ICD-10-CM

## 2023-12-06 DIAGNOSIS — I48.20 ATRIAL FIBRILLATION, CHRONIC: ICD-10-CM

## 2023-12-06 LAB
ANION GAP SERPL CALCULATED.3IONS-SCNC: 11 MMOL/L (ref 5–15)
BUN SERPL-MCNC: 18 MG/DL (ref 8–23)
BUN/CREAT SERPL: 17.1 (ref 7–25)
CALCIUM SPEC-SCNC: 9.4 MG/DL (ref 8.6–10.5)
CHLORIDE SERPL-SCNC: 100 MMOL/L (ref 98–107)
CO2 SERPL-SCNC: 26 MMOL/L (ref 22–29)
CREAT SERPL-MCNC: 1.05 MG/DL (ref 0.76–1.27)
DEPRECATED RDW RBC AUTO: 47.4 FL (ref 37–54)
EGFRCR SERPLBLD CKD-EPI 2021: 70.9 ML/MIN/1.73
ERYTHROCYTE [DISTWIDTH] IN BLOOD BY AUTOMATED COUNT: 14.5 % (ref 12.3–15.4)
GLUCOSE SERPL-MCNC: 126 MG/DL (ref 65–99)
HCT VFR BLD AUTO: 39.2 % (ref 37.5–51)
HGB BLD-MCNC: 13.1 G/DL (ref 13–17.7)
MCH RBC QN AUTO: 30.1 PG (ref 26.6–33)
MCHC RBC AUTO-ENTMCNC: 33.4 G/DL (ref 31.5–35.7)
MCV RBC AUTO: 90.1 FL (ref 79–97)
PLATELET # BLD AUTO: 275 10*3/MM3 (ref 140–450)
PMV BLD AUTO: 8.4 FL (ref 6–12)
POTASSIUM SERPL-SCNC: 3.7 MMOL/L (ref 3.5–5.2)
RBC # BLD AUTO: 4.35 10*6/MM3 (ref 4.14–5.8)
SODIUM SERPL-SCNC: 137 MMOL/L (ref 136–145)
WBC NRBC COR # BLD AUTO: 13.22 10*3/MM3 (ref 3.4–10.8)

## 2023-12-06 PROCEDURE — 71275 CT ANGIOGRAPHY CHEST: CPT

## 2023-12-06 PROCEDURE — 25510000001 IOPAMIDOL PER 1 ML: Performed by: INTERNAL MEDICINE

## 2023-12-06 PROCEDURE — 80048 BASIC METABOLIC PNL TOTAL CA: CPT

## 2023-12-06 PROCEDURE — 36415 COLL VENOUS BLD VENIPUNCTURE: CPT

## 2023-12-06 PROCEDURE — 85027 COMPLETE CBC AUTOMATED: CPT

## 2023-12-06 RX ADMIN — IOPAMIDOL 80 ML: 755 INJECTION, SOLUTION INTRAVENOUS at 11:03

## 2023-12-06 NOTE — PROGRESS NOTES
PRE-WATCHMAN HISTORY  Reg Guerra 1940   [address] [facility] [address]   [phone] (home)     Referring MD: Dr. Yee  Information obtained from: [x] Medical record review  [x] Patient report  Scheduled for: Watchman implant on 12/13/23 with Dr. Rodriguez  SDM Visit: Dr. Yee    History & Risk Factors (prior to Watchman implant)  [id number] Risk Factors:  Congestive HF     [x] No   [] Yes  LV Dysfunction   [x] No   [] Yes  Hypertension      [] No   [x] Yes  Diabetes    [x] No   [] Yes  Stroke       [x] No   [] Yes  TIA       [x] No   [] Yes  Thromboembolism    [x] No   [] Yes  Vascular Disease   [] No   [x] Yes- CAD s/p CABG    HAS-BLED Risk Factors:  HTN (uncontrolled) [x] No  [] Yes  Abnormal Renal Fxn  [x] No  [] Yes  Abnormal Liver Fxn   [x] No  [] Yes  Stroke            [x] No  [] Yes  Bleeding event [] No  [x] Yes  Labile INR      [x] No  [] Yes  Alcohol  [x] No  [] Yes  Antiplatelets      [] No  [x] Yes  NSAIDS  [x] No  [] Yes    Additional Stroke & Bleeding Risk Factors:  Increased Fall Risk   [x] No  [] Yes  Bleeding Event         [] No  [x] Yes      If Yes, Type      [] Intracranial [x] Epistaxis   [] GI   [] Other    Rhythm History:  AFIBTYPE: persistent    Valvular AFib        [x] No  [] Yes  Attempt at AFib Termination:  [] No  [x] Yes       If Yes, pharmacologic CV    [] No  [x] Yes       If Yes, DC cardioversion  [] No  [x] Yes       If Yes, catheter ablation  [] No  [x] Yes            If Yes, Date of most recent:  Remote AVN+PPM. Does not recall what year            If Yes, surgical ablation  [x] No  [] Yes  Atrial Flutter    [x] No  [] Yes  MAXINE Intervention    [x] No  [] Yes    Additional History & Risk Factors:  Cardiomyopathy   [x] No  [] Yes  Chronic Lung Disease  [x] No  [] Yes  Coronary Artery Disease  [] No  [x] Yes  Sleep Apnea    [x] No  [] Yes  Epicardial Approach Considered [x] No  [] Yes    Diagnostic Studies:  Last Echo:  [x] TTE Date: 8/30/2017          "         EF: 55-60%              LA: \"normal size\"             VHD: mild MR           Pre-procedure blood thinner medications:  Continue ASA, clopidogrel as instructed.       12/06/23 16:23 EST   YAYA Revised 1.31.2017   "

## 2023-12-06 NOTE — PAT
An arrival time for procedure was not provided during PAT visit. If patient had any questions or concerns about their arrival time, they were instructed to contact their surgeon/physician.  Additionally, if the patient referred to an arrival time that was acquired from their my chart account, patient was encouraged to verify that time with their surgeon/physician. Arrival times are NOT provided in Pre Admission Testing Department.    Patient viewed general PAT education video as instructed in their preoperative information received from their surgeon.  Patient stated the general PAT education video was viewed in its entirety and survey completed.  Copies of PAT general education handouts (Incentive Spirometry, Meds to Beds Program, Patient Belongings, Pre-op skin preparation instructions, Blood Glucose testing, Visitor policy, Surgery FAQ, Code H) distributed to patient if not printed. Education related to the PAT pass and skin preparation for surgery (if applicable) completed in PAT as a reinforcement to PAT education video. Patient instructed to return PAT pass provided today as well as completed skin preparation sheet (if applicable) on the day of procedure.     Additionally if patient had not viewed video yet but intended to view it at home or in our waiting area, then referred them to the handout with QR code/link provided during PAT visit.  Instructed patient to complete survey after viewing the video in its entirety.  Encouraged patient/family to read PAT general education handouts thoroughly and notify PAT staff with any questions or concerns. Patient verbalized understanding of all information and priority content.    Patient denies any current skin issues.

## 2023-12-11 LAB — CREAT BLDA-MCNC: 1.1 MG/DL (ref 0.6–1.3)

## 2023-12-13 ENCOUNTER — HOSPITAL ENCOUNTER (INPATIENT)
Facility: HOSPITAL | Age: 83
LOS: 1 days | Discharge: HOME | End: 2023-12-13
Attending: INTERNAL MEDICINE | Admitting: INTERNAL MEDICINE
Payer: MEDICARE

## 2023-12-13 ENCOUNTER — READMISSION MANAGEMENT (OUTPATIENT)
Dept: CALL CENTER | Facility: HOSPITAL | Age: 83
End: 2023-12-13
Payer: MEDICARE

## 2023-12-13 VITALS
BODY MASS INDEX: 27.24 KG/M2 | HEIGHT: 71 IN | OXYGEN SATURATION: 95 % | WEIGHT: 194.6 LBS | DIASTOLIC BLOOD PRESSURE: 60 MMHG | RESPIRATION RATE: 18 BRPM | HEART RATE: 70 BPM | TEMPERATURE: 97.4 F | SYSTOLIC BLOOD PRESSURE: 94 MMHG

## 2023-12-13 DIAGNOSIS — I48.20 ATRIAL FIBRILLATION, CHRONIC: ICD-10-CM

## 2023-12-13 DIAGNOSIS — R04.0 BLEEDING NOSE: ICD-10-CM

## 2023-12-13 DIAGNOSIS — I48.19 ATRIAL FIBRILLATION, PERSISTENT: ICD-10-CM

## 2023-12-13 PROCEDURE — C1889 IMPLANT/INSERT DEVICE, NOC: HCPCS | Performed by: INTERNAL MEDICINE

## 2023-12-13 PROCEDURE — C1894 INTRO/SHEATH, NON-LASER: HCPCS | Performed by: INTERNAL MEDICINE

## 2023-12-13 PROCEDURE — 25010000002 FENTANYL CITRATE (PF) 50 MCG/ML SOLUTION: Performed by: INTERNAL MEDICINE

## 2023-12-13 PROCEDURE — 02L73DK OCCLUSION OF LEFT ATRIAL APPENDAGE WITH INTRALUMINAL DEVICE, PERCUTANEOUS APPROACH: ICD-10-PCS | Performed by: INTERNAL MEDICINE

## 2023-12-13 PROCEDURE — C1759 CATH, INTRA ECHOCARDIOGRAPHY: HCPCS | Performed by: INTERNAL MEDICINE

## 2023-12-13 PROCEDURE — 25010000002 ONDANSETRON PER 1 MG: Performed by: INTERNAL MEDICINE

## 2023-12-13 PROCEDURE — 33340 PERQ CLSR TCAT L ATR APNDGE: CPT | Performed by: INTERNAL MEDICINE

## 2023-12-13 PROCEDURE — C1760 CLOSURE DEV, VASC: HCPCS | Performed by: INTERNAL MEDICINE

## 2023-12-13 PROCEDURE — 99152 MOD SED SAME PHYS/QHP 5/>YRS: CPT | Performed by: INTERNAL MEDICINE

## 2023-12-13 PROCEDURE — 25010000002 BUPIVACAINE 0.5 % SOLUTION: Performed by: INTERNAL MEDICINE

## 2023-12-13 PROCEDURE — 25010000002 CEFAZOLIN PER 500 MG: Performed by: PHYSICIAN ASSISTANT

## 2023-12-13 PROCEDURE — 93662 INTRACARDIAC ECG (ICE): CPT | Performed by: INTERNAL MEDICINE

## 2023-12-13 PROCEDURE — B245ZZ4 ULTRASONOGRAPHY OF LEFT HEART, TRANSESOPHAGEAL: ICD-10-PCS | Performed by: INTERNAL MEDICINE

## 2023-12-13 PROCEDURE — 25010000002 LIDOCAINE 1 % SOLUTION: Performed by: INTERNAL MEDICINE

## 2023-12-13 PROCEDURE — 85347 COAGULATION TIME ACTIVATED: CPT

## 2023-12-13 PROCEDURE — C1893 INTRO/SHEATH, FIXED,NON-PEEL: HCPCS | Performed by: INTERNAL MEDICINE

## 2023-12-13 PROCEDURE — 99153 MOD SED SAME PHYS/QHP EA: CPT | Performed by: INTERNAL MEDICINE

## 2023-12-13 PROCEDURE — 25010000002 PROTAMINE SULFATE PER 10 MG: Performed by: INTERNAL MEDICINE

## 2023-12-13 PROCEDURE — C1769 GUIDE WIRE: HCPCS | Performed by: INTERNAL MEDICINE

## 2023-12-13 PROCEDURE — 25010000002 HEPARIN (PORCINE) PER 1000 UNITS: Performed by: INTERNAL MEDICINE

## 2023-12-13 PROCEDURE — 25810000003 SODIUM CHLORIDE 0.9 % SOLUTION: Performed by: INTERNAL MEDICINE

## 2023-12-13 PROCEDURE — 25010000002 MIDAZOLAM PER 1 MG: Performed by: INTERNAL MEDICINE

## 2023-12-13 DEVICE — CAP SYS WATCHMAN TRUSEAL ACC PROC: Type: IMPLANTABLE DEVICE | Status: FUNCTIONAL

## 2023-12-13 DEVICE — LEFT ATRIAL APPENDAGE CLOSURE DEVICE WITH DELIVERY SYSTEM
Type: IMPLANTABLE DEVICE | Site: HEART | Status: FUNCTIONAL
Brand: WATCHMAN FLX™

## 2023-12-13 DEVICE — CAP WATCHMAN FLX PROC: Type: IMPLANTABLE DEVICE | Status: FUNCTIONAL

## 2023-12-13 RX ORDER — LIDOCAINE HYDROCHLORIDE 10 MG/ML
INJECTION, SOLUTION INFILTRATION; PERINEURAL
Status: DISCONTINUED | OUTPATIENT
Start: 2023-12-13 | End: 2023-12-13 | Stop reason: HOSPADM

## 2023-12-13 RX ORDER — HEPARIN SODIUM 1000 [USP'U]/ML
INJECTION, SOLUTION INTRAVENOUS; SUBCUTANEOUS
Status: DISCONTINUED | OUTPATIENT
Start: 2023-12-13 | End: 2023-12-13 | Stop reason: HOSPADM

## 2023-12-13 RX ORDER — MIDAZOLAM HYDROCHLORIDE 1 MG/ML
INJECTION INTRAMUSCULAR; INTRAVENOUS
Status: DISCONTINUED | OUTPATIENT
Start: 2023-12-13 | End: 2023-12-13 | Stop reason: HOSPADM

## 2023-12-13 RX ORDER — BUPIVACAINE HYDROCHLORIDE 5 MG/ML
INJECTION, SOLUTION PERINEURAL
Status: DISCONTINUED | OUTPATIENT
Start: 2023-12-13 | End: 2023-12-13 | Stop reason: HOSPADM

## 2023-12-13 RX ORDER — SODIUM CHLORIDE 9 MG/ML
INJECTION, SOLUTION INTRAVENOUS
Status: COMPLETED | OUTPATIENT
Start: 2023-12-13 | End: 2023-12-13

## 2023-12-13 RX ORDER — ONDANSETRON 2 MG/ML
4 INJECTION INTRAMUSCULAR; INTRAVENOUS EVERY 6 HOURS PRN
Status: DISCONTINUED | OUTPATIENT
Start: 2023-12-13 | End: 2023-12-13 | Stop reason: HOSPADM

## 2023-12-13 RX ORDER — SODIUM CHLORIDE 0.9 % (FLUSH) 0.9 %
10 SYRINGE (ML) INJECTION AS NEEDED
Status: DISCONTINUED | OUTPATIENT
Start: 2023-12-13 | End: 2023-12-13 | Stop reason: HOSPADM

## 2023-12-13 RX ORDER — ONDANSETRON 2 MG/ML
INJECTION INTRAMUSCULAR; INTRAVENOUS
Status: DISCONTINUED | OUTPATIENT
Start: 2023-12-13 | End: 2023-12-13 | Stop reason: HOSPADM

## 2023-12-13 RX ORDER — FENTANYL CITRATE 50 UG/ML
INJECTION, SOLUTION INTRAMUSCULAR; INTRAVENOUS
Status: DISCONTINUED | OUTPATIENT
Start: 2023-12-13 | End: 2023-12-13 | Stop reason: HOSPADM

## 2023-12-13 RX ORDER — ACETAMINOPHEN 325 MG/1
650 TABLET ORAL EVERY 4 HOURS PRN
Status: DISCONTINUED | OUTPATIENT
Start: 2023-12-13 | End: 2023-12-13 | Stop reason: HOSPADM

## 2023-12-13 RX ORDER — NITROGLYCERIN 0.4 MG/1
0.4 TABLET SUBLINGUAL
Status: DISCONTINUED | OUTPATIENT
Start: 2023-12-13 | End: 2023-12-13 | Stop reason: HOSPADM

## 2023-12-13 RX ORDER — PROTAMINE SULFATE 10 MG/ML
INJECTION, SOLUTION INTRAVENOUS
Status: DISCONTINUED | OUTPATIENT
Start: 2023-12-13 | End: 2023-12-13 | Stop reason: HOSPADM

## 2023-12-13 RX ORDER — SODIUM CHLORIDE 0.9 % (FLUSH) 0.9 %
3 SYRINGE (ML) INJECTION EVERY 12 HOURS SCHEDULED
Status: DISCONTINUED | OUTPATIENT
Start: 2023-12-13 | End: 2023-12-13 | Stop reason: HOSPADM

## 2023-12-13 RX ORDER — CLOPIDOGREL BISULFATE 75 MG/1
75 TABLET ORAL DAILY
Status: DISCONTINUED | OUTPATIENT
Start: 2023-12-13 | End: 2023-12-13 | Stop reason: HOSPADM

## 2023-12-13 RX ORDER — SODIUM CHLORIDE 9 MG/ML
40 INJECTION, SOLUTION INTRAVENOUS AS NEEDED
Status: DISCONTINUED | OUTPATIENT
Start: 2023-12-13 | End: 2023-12-13 | Stop reason: HOSPADM

## 2023-12-13 RX ORDER — ASPIRIN 81 MG/1
81 TABLET ORAL DAILY
Status: DISCONTINUED | OUTPATIENT
Start: 2023-12-13 | End: 2023-12-13 | Stop reason: HOSPADM

## 2023-12-13 RX ADMIN — SODIUM CHLORIDE 2 G: 900 INJECTION INTRAVENOUS at 10:11

## 2023-12-13 NOTE — NURSING NOTE
LAAO Procedure Information   Reg Guerra 1940   [address] [facility] [id number] [phone] (home)       MAXINE Orifice Maximal Width: 20  mm  Cumulative Air Kerma:  119 mGy  Contrast Volume:  40 mL  Dose Area Product:  862.68 ?Gy-M2    Reyna Montero RN

## 2023-12-13 NOTE — OUTREACH NOTE
Prep Survey      Flowsheet Row Responses   [facility] patient discharged from? [place]   Is LACE score < 7 ? Yes   Eligibility [facility]   Date of Admission 12/13/23   Date of Discharge 12/13/23   Discharge Disposition Home or Self Care   Discharge diagnosis Atrial fibrillation, chronic (Primary)   Does the patient have one of the following disease processes/diagnoses(primary or secondary)? Other   Does the patient have Home health ordered? No   Is there a DME ordered? No   Prep survey completed? Yes            JAVON SPRAGUE - Registered Nurse

## 2023-12-13 NOTE — OP NOTE
" LEFT ATRIAL APPENDAGE CLOSURE (LAAC)                                 Watchman Implant    PROCEDURES PERFORMED:    Left atrial appendage closure with 24 mm Watchman FLX device with intracardiac echocardiography.  Patient was admitted to the hospital for this procedure.  This is an inpatient procedure.    PREPROCEDURAL DIAGNOSES:    1. Persistent Atrial fibrillation  2. [id number] score of 5  3. HASBLED score of 4    REFERRING PHYSICIAN:    Dr Yee    POST PROCEDURE DIANGOSES:  As above.    INDICATION FOR PROCEDURE:  Briefly, Reg Guerra is a 83 y.o. year old male with a history of severe bleeding and high risk for carioembolic stroke from atrial fibrillation presents for elective inpatient watchman implantation.    ANTICOAGULATION STRATEGY PRIOR TO AND POST PROCEDURE:  DAP.  The last dose of anticoagulant was confirmed to have been taken this AM.    PT/INR:  No results found for: \"LABPROT\", \"INR\"  PTT:  No results found for: \"APTT\"    CBC: No results found for: \"WBC\", \"RBC\", \"HGB\", \"HCT\", \"MCV\", \"RDW\", \"PLT\"  BMP:   No results found for: \"NA\", \"K\", \"CL\", \"CO2\", \"BUN\", \"CREATININE\", \"LABCREA\", \"EGFR\", \"GLU\", \"LABGLUC\"    Vital Signs: /67   Pulse 70   Temp 97.4 °F (36.3 °C) (Temporal)   Resp 18   Ht 180.3 cm (71\")   Wt 88.3 kg (194 lb 9.6 oz)   SpO2 96%   BMI 27.14 kg/m²  O2 Flow Rate (L/min): 2 L/min   Admit Weight:  88.3 kg (194 lb 9.6 oz)  BMI: Body mass index is 27.14 kg/m².    MODERATE SEDATION FOR PROCEDURE:    Moderate sedation was given during this procedure.    I supervised and directed RN to administer this sedation.  This staff member also monitored the patient's hemodynamic and respiratory status and response to these medications.  Please see the full detailed procedure report generated by the electrophysiology laboratory staff.  The patient tolerated moderate sedation well.  There were no complications regarding sedation.  The total dose of fentanyl was 75 mcg and the total dose " of midazolam was 3 mg.  The total dose of Brevital was 20 mg mg.  First sedation was administered at 1013 and continued through 1045.    PROCEDURE NARRATIVE:    The patient was able to give written informed consent after revisiting the key portions of the risk versus benefit profile of the procedure.  This discussion was framed by our lengthy conversations  (please see our detailed notes).  Patient verbalized strong understanding of this discussion and a strong desire to proceed with the procedure.  Please note that this detailed informed consent process utilized mutual and shared decision making process between all parties involved, principally the physician and patient, but also potentially with input from the patient's selected family and friends.    Please also note that the patient was seen and examined prior to this procedure by a non implanting physician who agreed that this patient would benefit from left atrial appendage closure as an alternative to long-term anticoagulation.  Please see this physician separate attestation.    The patient was brought to the EP Lab in the post absorptive state.      The patient was then prepared and draped in a routing sterile fashion.  Seldinger access was obtained at the right common femoral vein with a dual venipunctures utilizing ultrasound probe guided vascular access.  A J tip wire was advanced into the vascular space.  A pair of sheaths were placed into the inferior vena cava.  The patient was anticoagulated with unfractionated heparin in bolus and then continuous infusion to maintain an ACT of between 200 and 300 seconds.    A phased array intracardiac echocardiography probe was placed into the right atrium, right ventricle and after transseptal puncture also into the left atrium and left ventricle.  This was used for visualization of critical cardiac structures such as the fossa ovalis interatrial septum left atrial appendage left superior pulmonary vein mitral  valve annulus and pericardial space.  This aspect of the procedure is a separate and independent part of the procedure which was critical for implantation of the Watchman.    We performed transseptal puncture with a combination of fluoroscopic and echocardiographic guidance.    The Watchman delivery sheath was then placed in the left atrium under echocardiographic and fluoroscopic guidance safely.  Left atrial appendage was then cannulated with a 10 mm angled pigtail catheter.  This was again performed with a combination of echocardiographic and fluoroscopic guidance.  Contrast injection was also performed.  The Watchman delivery sheath was then placed into optimal position within left atrial appendage.  The left atrial appendage diameter was viewed in multiple projections on echocardiography as well as fluoroscopy with contrast injection into the left atrial appendage.  A 24 mm device was selected and delivered with the supplied delivery tools to the left atrial appendage safely.      PASS criteria were then evaluated and confirmed.      The device was then safely released.    Catheters and sheaths were withdrawn from the left atrium and then the body.  Hemostasis at the site of venous access was achieved after withdrawing the sheath from the body with a vascade closure device  and manual pressure.  Intracardiac echocardiogram demonstrated no pericardial effusion.    Protamine was given to reverse anticoagulation.    The patient was transferred to recovery in stable condition.    COMPLICATIONS: none    EBL: minimal    RADIATION EXPOSURE: 119 mGy over 3.3 minutes    LA PRESSURE: prior to placement of the left atrial appendage closure device.: 13 / 3 mmHg, with a mean of  6 mmHg    KEY PROCEDURAL FINDINGS:     Successful 24 mm Watchman FLX implantation with all trabeculae covered and no peridevice leak    POST PROCEDURAL PLAN:    Dual antiplatelet therapy  An imaging ( LA or CT scan ) will be obtained to assess for  appropriate closure of the left atrial appendage occlusion device at 45 days post implant.    Please note that this patient was admitted specifically for this elective inpatient procedure.  This is an inpatient procedure.   Due to COVID-19 global pandemic and bed shortage we will endeavor to discharge this patient later today rather than tomorrow.  I think that he will tolerate this well.  We will of course wait until later in the afternoon to make a final decision.  The patient will be seen at our office per protocol for this procedure.    Vicente Rodriguez DO, FACC, [facility]  Cardiac Electrophysiologist  [facility] / [facility]

## 2023-12-13 NOTE — Clinical Note
Hemostasis started on the right femoral vein. Vascade MVP was used in achieving hemostasis. Closure device deployed in the vessel. Hemostasis achieved successfully. Closure device additional comment: Vascade with tegaderm dressing

## 2023-12-13 NOTE — INTERVAL H&P NOTE
H&P reviewed. The patient was examined and there are no changes to the H&P.       EP Pre-Procedure Report  Cardiovascular Laboratory  [facility]    Patient:  Reg Guerra  :  1940    DATE: 2023      MEDICATIONS:  Prior to Admission medications    Medication Sig Start Date End Date Taking? Authorizing Provider   allopurinol (ZYLOPRIM) 100 MG tablet Take 1 tablet by mouth 2 (Two) Times a Day. 23  Yes Gloria Siegel PA-C   amLODIPine (NORVASC) 5 MG tablet Take 1 tablet by mouth Daily. 10/25/23  Yes Fredy Yee MD   aspirin 81 MG EC tablet Take 1 tablet by mouth Daily.  Patient taking differently: Take 1 tablet by mouth Every Other Day. 23  Yes Fredy Yee MD   chlorthalidone (HYGROTON) 25 MG tablet Take 1 tablet by mouth Daily. 10/25/23  Yes Fredy Yee MD   clopidogrel (PLAVIX) 75 MG tablet Take 1 tablet by mouth Daily. 23  Yes Vicente Rodriguez DO   finasteride (PROSCAR) 5 MG tablet Take 1 tablet by mouth Daily.   Yes Madelaine Heard MD   hydrALAZINE (APRESOLINE) 50 MG tablet Take 1 tablet by mouth 2 (Two) Times a Day. 10/25/23  Yes Fredy Yee MD   levothyroxine (SYNTHROID, LEVOTHROID) 88 MCG tablet Take 1 tablet by mouth Daily. 23  Yes Gloria Siegel PA-C   omeprazole (priLOSEC) 40 MG capsule Take 1 capsule by mouth Daily. Before a meal 23  Yes Gloria Siegel PA-C   rosuvastatin (CRESTOR) 40 MG tablet Take 1 tablet by mouth Daily. 10/25/23  Yes Fredy Yee MD   sucralfate (CARAFATE) 1 g tablet Take one tablet by mouth 3 times daily on an empty stomach 1 hour before meals 23  Yes Gloria Siegel PA-C   tamsulosin (FLOMAX) 0.4 MG capsule 24 hr capsule Take 1 capsule by mouth Daily. 1/2 HOUR FOLLOWING THE SAME MEAL EACH DAY   Yes Madelaine Heard MD   nitroglycerin (NITROSTAT) 0.4 MG SL tablet Place 1 tablet under the tongue Every 5 (Five) Minutes As Needed for Chest Pain. Take  no more than 3 doses in 15 minutes.    Provider, MD Madelaine       Past medical & surgical history, social and family history reviewed in the electronic medical record.    Physical Exam:    Vitals:   Vitals:    12/13/23 0713   BP: 147/78   Pulse: 69   Resp:    Temp:    SpO2: 98%          12/13/23  0711   Weight: 88.3 kg (194 lb 9.6 oz)   Body mass index is 27.14 kg/m².    GENERAL: No apparent distress.  No significant changes since last exam.  CHEST: Clear to auscultation bilaterally no stridor no wheeze.  CV: S1, S2, regular without Murmurs, Rubs or Gallops  EXTREMITIES: No edema.    Results from last 7 days   Lab Units 12/06/23  1110   SODIUM mmol/L 137   POTASSIUM mmol/L 3.7   CHLORIDE mmol/L 100   CO2 mmol/L 26.0   BUN mg/dL 18   CREATININE mg/dL 1.05   GLUCOSE mg/dL 126*   CALCIUM mg/dL 9.4     Results from last 7 days   Lab Units 12/06/23  1110   WBC 10*3/mm3 13.22*   HEMOGLOBIN g/dL 13.1   HEMATOCRIT % 39.2   PLATELETS 10*3/mm3 275       IMPRESSION:Cheif Complaint EP: Atrial Fibrillation      PLAN:  Procedure to perform: LAAO        Electronically signed by ROMY Huitron, 12/13/23, 8:59 AM EST.

## 2023-12-14 ENCOUNTER — TRANSITIONAL CARE MANAGEMENT TELEPHONE ENCOUNTER (OUTPATIENT)
Dept: CALL CENTER | Facility: HOSPITAL | Age: 83
End: 2023-12-14
Payer: MEDICARE

## 2023-12-14 LAB — ACT BLD: 298 SECONDS (ref 82–152)

## 2023-12-14 NOTE — OUTREACH NOTE
Call Center TCM Note      Flowsheet Row Responses   [facility] patient discharged from? [place]   Does the patient have one of the following disease processes/diagnoses(primary or secondary)? Other   TCM attempt successful? Yes   Call start time 1235   Call end time 1239   Discharge diagnosis Atrial fibrillation, chronic (Primary)   Meds reviewed with patient/caregiver? Yes   Is the patient having any side effects they believe may be caused by any medication additions or changes? No   Does the patient have all medications ordered at discharge? Yes   Is the patient taking all medications as directed (includes completed medication regime)? Yes   Comments Scheduled hospital f/u appt. with PCP 12/19/23 @ 11am.   Does the patient have an appointment with their PCP within 7-14 days of discharge? Yes   Has home health visited the patient within 72 hours of discharge? N/A   Psychosocial issues? No   Did the patient receive a copy of their discharge instructions? Yes   Nursing interventions Reviewed instructions with patient   What is the patient's perception of their health status since discharge? Improving   Is the patient/caregiver able to teach back signs and symptoms related to disease process for when to call PCP? Yes   Is the patient/caregiver able to teach back signs and symptoms related to disease process for when to call 911? Yes   Is the patient/caregiver able to teach back the hierarchy of who to call/visit for symptoms/problems? PCP, Specialist, Home health nurse, Urgent Care, ED, 911 Yes   If the patient is a current smoker, are they able to teach back resources for cessation? Not a smoker   TCM call completed? Yes   Call end time 1239   Would this patient benefit from a Referral to Amb Social Work? No   Is the patient interested in additional calls from an ambulatory ? No            Adwoa Mathews RN    12/14/2023, 12:41 EST

## 2023-12-18 DIAGNOSIS — I48.19 PERSISTENT ATRIAL FIBRILLATION: Primary | ICD-10-CM

## 2023-12-18 DIAGNOSIS — Z95.818 PRESENCE OF WATCHMAN LEFT ATRIAL APPENDAGE CLOSURE DEVICE: ICD-10-CM

## 2023-12-19 ENCOUNTER — OFFICE VISIT (OUTPATIENT)
Dept: FAMILY MEDICINE CLINIC | Facility: CLINIC | Age: 83
End: 2023-12-19
Payer: MEDICARE

## 2023-12-19 VITALS
HEART RATE: 71 BPM | HEIGHT: 71 IN | DIASTOLIC BLOOD PRESSURE: 70 MMHG | WEIGHT: 194 LBS | BODY MASS INDEX: 27.16 KG/M2 | OXYGEN SATURATION: 96 % | SYSTOLIC BLOOD PRESSURE: 120 MMHG

## 2023-12-19 DIAGNOSIS — I10 PRIMARY HYPERTENSION: ICD-10-CM

## 2023-12-19 DIAGNOSIS — I48.20 ATRIAL FIBRILLATION, CHRONIC: ICD-10-CM

## 2023-12-19 DIAGNOSIS — D72.829 LEUKOCYTOSIS, UNSPECIFIED TYPE: Primary | ICD-10-CM

## 2023-12-19 NOTE — PROGRESS NOTES
Transitional Care Follow Up Visit  Subjective     Reg Gian Guerra is a 83 y.o. male who presents for a transitional care management visit.    Within 48 business hours after discharge our office contacted him via telephone to coordinate his care and needs.      I reviewed and discussed the details of that call along with the discharge summary, hospital problems, inpatient lab results, inpatient diagnostic studies, and consultation reports with Reg.     Current outpatient and discharge medications have been reconciled for the patient.  Reviewed by: Gloria Siegel PA-C          12/13/2023     5:15 PM   Date of TCM Phone Call   [facility]   Date of Admission 12/13/2023   Date of Discharge 12/13/2023   Discharge Disposition Home or Self Care     Risk for Readmission (LACE) No data recorded    History of Present Illness   Course During Hospital Stay:  He is in today for f/up after having Watchman procedure done on 12/13/2023. He states has been doing well. Denies chest pain, shortness of breath or palpitations. Denies any fever. No vomiting or diarrhea. Denies any area of redness, heat or pain to procedure area.      The following portions of the patient's history were reviewed and updated as appropriate: allergies, current medications, past family history, past medical history, past social history, past surgical history, and problem list.    Review of Systems   Constitutional:  Negative for fever.   HENT:  Negative for congestion and sore throat.    Respiratory:  Negative for cough and shortness of breath.    Cardiovascular:  Negative for chest pain, palpitations and leg swelling.   Gastrointestinal:  Negative for abdominal pain, constipation, nausea and vomiting.   Genitourinary:  Negative for dysuria and frequency.   Neurological:  Negative for dizziness and headaches.       Objective   Physical Exam  Constitutional:       Appearance: Normal appearance.   HENT:      Right Ear: Tympanic  membrane normal.      Left Ear: Tympanic membrane normal.      Mouth/Throat:      Pharynx: Oropharynx is clear.   Eyes:      Conjunctiva/sclera: Conjunctivae normal.      Pupils: Pupils are equal, round, and reactive to light.   Cardiovascular:      Rate and Rhythm: Normal rate and regular rhythm.      Heart sounds: Normal heart sounds.   Pulmonary:      Effort: Pulmonary effort is normal.      Breath sounds: Normal breath sounds.   Abdominal:      Palpations: Abdomen is soft.      Tenderness: There is no abdominal tenderness.   Skin:     Comments: No redness or heat noted to right groin area   Neurological:      Mental Status: He is oriented to person, place, and time.   Psychiatric:         Mood and Affect: Mood normal.         Behavior: Behavior normal.         Assessment & Plan   Problems Addressed this Visit       Atrial fibrillation, chronic  Patient states is doing well. Will schedule f/up with his cardiologist since having procedure done.     2. Primary hypertension       Continue current medications.   Other Visit Diagnoses       3. Leukocytosis, unspecified type    -  Primary    Relevant Orders    CBC & Differential       Will recheck cbc - mild elevation at hospital . Patient states is feeling well. RTC prior to recheck as needed.        Diagnoses         Codes Comments    Leukocytosis, unspecified type    -  Primary ICD-10-CM: D72.829  ICD-9-CM: 288.60     Atrial fibrillation, chronic     ICD-10-CM: I48.20  ICD-9-CM: 427.31     Primary hypertension     ICD-10-CM: I10  ICD-9-CM: 401.9

## 2023-12-20 ENCOUNTER — TELEPHONE (OUTPATIENT)
Dept: FAMILY MEDICINE CLINIC | Facility: CLINIC | Age: 83
End: 2023-12-20
Payer: MEDICARE

## 2023-12-20 LAB
BASOPHILS # BLD AUTO: 0.1 X10E3/UL (ref 0–0.2)
BASOPHILS NFR BLD AUTO: 1 %
EOSINOPHIL # BLD AUTO: 0.5 X10E3/UL (ref 0–0.4)
EOSINOPHIL NFR BLD AUTO: 5 %
ERYTHROCYTE [DISTWIDTH] IN BLOOD BY AUTOMATED COUNT: 14.1 % (ref 11.6–15.4)
HCT VFR BLD AUTO: 38.4 % (ref 37.5–51)
HGB BLD-MCNC: 12.7 G/DL (ref 13–17.7)
IMM GRANULOCYTES # BLD AUTO: 0.1 X10E3/UL (ref 0–0.1)
IMM GRANULOCYTES NFR BLD AUTO: 1 %
LYMPHOCYTES # BLD AUTO: 2 X10E3/UL (ref 0.7–3.1)
LYMPHOCYTES NFR BLD AUTO: 21 %
MCH RBC QN AUTO: 29.3 PG (ref 26.6–33)
MCHC RBC AUTO-ENTMCNC: 33.1 G/DL (ref 31.5–35.7)
MCV RBC AUTO: 89 FL (ref 79–97)
MONOCYTES # BLD AUTO: 0.8 X10E3/UL (ref 0.1–0.9)
MONOCYTES NFR BLD AUTO: 8 %
NEUTROPHILS # BLD AUTO: 6 X10E3/UL (ref 1.4–7)
NEUTROPHILS NFR BLD AUTO: 64 %
PLATELET # BLD AUTO: 297 X10E3/UL (ref 150–450)
RBC # BLD AUTO: 4.33 X10E6/UL (ref 4.14–5.8)
WBC # BLD AUTO: 9.4 X10E3/UL (ref 3.4–10.8)

## 2023-12-21 ENCOUNTER — OFFICE VISIT (OUTPATIENT)
Dept: CARDIOLOGY | Facility: CLINIC | Age: 83
End: 2023-12-21
Payer: MEDICARE

## 2023-12-21 VITALS
WEIGHT: 192 LBS | HEIGHT: 71 IN | BODY MASS INDEX: 26.88 KG/M2 | SYSTOLIC BLOOD PRESSURE: 118 MMHG | RESPIRATION RATE: 18 BRPM | DIASTOLIC BLOOD PRESSURE: 68 MMHG | OXYGEN SATURATION: 96 % | HEART RATE: 86 BPM

## 2023-12-21 DIAGNOSIS — I48.20 ATRIAL FIBRILLATION, CHRONIC: Primary | ICD-10-CM

## 2023-12-21 DIAGNOSIS — E78.2 MIXED HYPERLIPIDEMIA: ICD-10-CM

## 2023-12-21 DIAGNOSIS — I25.10 CORONARY ARTERY DISEASE INVOLVING NATIVE CORONARY ARTERY OF NATIVE HEART WITHOUT ANGINA PECTORIS: ICD-10-CM

## 2023-12-21 DIAGNOSIS — I10 PRIMARY HYPERTENSION: ICD-10-CM

## 2023-12-21 PROBLEM — I48.19 ATRIAL FIBRILLATION, PERSISTENT: Status: RESOLVED | Noted: 2023-12-01 | Resolved: 2023-12-21

## 2023-12-21 PROBLEM — I48.19 PERSISTENT ATRIAL FIBRILLATION: Status: RESOLVED | Noted: 2023-12-13 | Resolved: 2023-12-21

## 2023-12-21 PROCEDURE — 3078F DIAST BP <80 MM HG: CPT | Performed by: INTERNAL MEDICINE

## 2023-12-21 PROCEDURE — 99214 OFFICE O/P EST MOD 30 MIN: CPT | Performed by: INTERNAL MEDICINE

## 2023-12-21 PROCEDURE — 1160F RVW MEDS BY RX/DR IN RCRD: CPT | Performed by: INTERNAL MEDICINE

## 2023-12-21 PROCEDURE — 3074F SYST BP LT 130 MM HG: CPT | Performed by: INTERNAL MEDICINE

## 2023-12-21 PROCEDURE — 1159F MED LIST DOCD IN RCRD: CPT | Performed by: INTERNAL MEDICINE

## 2023-12-21 NOTE — PROGRESS NOTES
[facility] [place]  [facility] CARDIOLOGY  [address] [place] [address]  Dept: [phone]  Dept Fax: [phone]    Reg Guerra  1940    Follow Up Office Visit Note    History of Present Illness:  Reg Guerra is a 83 y.o. male who presents to the clinic for Follow-up. CAD- He is s./p CABG  denies any CP, SOB, palpitations on ASA CABG in 2005 BP is 130.80, he recently has the Watchman placed and doing good also on Plavix    The following portions of the patient's history were reviewed and updated as appropriate: allergies, current medications, past family history, past medical history, past social history, past surgical history, and problem list.    Medications:  allopurinol  amLODIPine  aspirin  chlorthalidone  clopidogrel  finasteride  hydrALAZINE  levothyroxine  nitroglycerin  omeprazole  rosuvastatin  sucralfate  tamsulosin capsule    Subjective  No Known Allergies     Past Medical History:   Diagnosis Date    Acquired hypothyroidism     Allergic rhinitis due to pollen     Appendicitis     Atherosclerotic heart disease of native coronary artery with other forms of angina pectoris     Benign prostate hyperplasia     Bilateral cataracts     BMI 28.0-28.9,adult     Chronic atrial fibrillation     Chronic gout, unspecified, without tophus (tophi)     Chronic gouty arthritis     Chronic low back pain     Diabetes     DJD (degenerative joint disease)     Dyslipidemia     Essential hypertension     Gastroesophageal reflux disease without esophagitis     Hiatal hernia     History of rheumatic fever as a child     Hyperlipidemia     Knee injury     Mixed hyperlipidemia     Obesity     Peptic ulcer disease     Presence of cardiac pacemaker        Past Surgical History:   Procedure Laterality Date    APPENDECTOMY  1957    ATRIAL APPENDAGE EXCLUSION LEFT WITH TRANSESOPHAGEAL ECHOCARDIOGRAM N/A 12/13/2023    Procedure: Atrial Appendage Occlusion;  Surgeon: Vicente Rodriguez  "DO;  Location: [facility] INVASIVE LOCATION;  Service: Cardiology;  Laterality: N/A;    BYPASS GRAFT      CARDIAC PACEMAKER PLACEMENT      VVI    COLONOSCOPY      KIDNEY STONE SURGERY      LITHOTRIPSY    KNEE SURGERY  1960    MANDIBLE FRACTURE SURGERY  2009    KICKED BY HORSE    NISSEN FUNDOPLICATION      TURP / TRANSURETHRAL INCISION / DRAINAGE PROSTATE      VENTRAL HERNIA REPAIR         Family History   Problem Relation Age of Onset    Pancreatic cancer Father     Heart attack Sister     Diabetes Sister     Hypertension Sister         Social History     Socioeconomic History    Marital status:     Number of children: 2    Highest education level: 12th grade   Tobacco Use    Smoking status: Never    Smokeless tobacco: Never   Vaping Use    Vaping Use: Never used   Substance and Sexual Activity    Alcohol use: Never    Drug use: Never    Sexual activity: Defer       Review of Systems   Constitutional: Negative.    HENT: Negative.     Respiratory: Negative.     Cardiovascular: Negative.    Endocrine: Negative.    Genitourinary: Negative.    Musculoskeletal: Negative.    Skin: Negative.    Allergic/Immunologic: Negative.    Neurological: Negative.    Hematological: Negative.    Psychiatric/Behavioral: Negative.       Cardiovascular Procedures    ECHO/MUGA:  STRESS TESTS:   CARDIAC CATH:   DEVICES:   HOLTER:   CT/MRI:   VASCULAR:   CARDIOTHORACIC:     Objective  Vitals:    12/21/23 0905   BP: 118/68   BP Location: Right arm   Patient Position: Lying   Cuff Size: Adult   Pulse: 86   Resp: 18   SpO2: 96%   Weight: 87.1 kg (192 lb)   Height: 180.3 cm (71\")   PainSc: 0-No pain     Body mass index is 26.78 kg/m².     Physical Exam  Vitals reviewed.   Constitutional:       Appearance: Healthy appearance. Not in distress.   Eyes:      Pupils: Pupils are equal, round, and reactive to light.   HENT:    Mouth/Throat:      Pharynx: Oropharynx is clear.   Neck:      Thyroid: Thyroid normal.      Vascular: No JVR. JVD normal. "   Pulmonary:      Effort: Pulmonary effort is normal.      Breath sounds: Normal breath sounds. No wheezing. No rhonchi. No rales.   Chest:      Chest wall: Not tender to palpatation.   Cardiovascular:      PMI at left midclavicular line. Normal rate. Irregularly irregular rhythm. Normal S1. Normal S2.       Murmurs: There is no murmur.      No gallop.  No click. No rub.   Pulses:     Intact distal pulses.      Carotid: 3+ bilaterally.     Radial: 3+ bilaterally.     Femoral: 3+ bilaterally.     Dorsalis pedis: 3+ bilaterally.     Posterior tibial: 3+ bilaterally.  Edema:     Peripheral edema absent.   Abdominal:      General: Bowel sounds are normal.      Palpations: Abdomen is soft.      Tenderness: There is no abdominal tenderness.   Musculoskeletal: Normal range of motion.         General: No tenderness.      Cervical back: Normal range of motion and neck supple. Skin:     General: Skin is warm and dry.   Neurological:      General: No focal deficit present.      Mental Status: Alert and oriented to person, place and time.        Diagnostic Data  Procedures    Assessment and Plan  Diagnoses and all orders for this visit:    Atrial fibrillation, chronic-rate control, no AV blockers, doing good, s/p Watchman device    Coronary artery disease involving native coronary artery of native heart without angina pectoris- s/p CABG in 2005, no complaints     Primary hypertension- BP is 130.80 on Hydralazine 50 mg bid, Amlodipine 5 mg and also chlorthalidone 25 mg    Mixed hyperlipidemia- On Crestor 40 mg, LDL is 56         No follow-ups on file.    Fredy Yee MD  12/21/2023

## 2023-12-27 RX ORDER — OMEPRAZOLE 40 MG/1
40 CAPSULE, DELAYED RELEASE ORAL DAILY
Qty: 90 CAPSULE | Refills: 3 | OUTPATIENT
Start: 2023-12-27

## 2024-01-17 NOTE — TELEPHONE ENCOUNTER
UPCOMING APPTS  With Cardiology  01/29/2024 at 9:00 AM  LAST OFFICE VISIT - THIS DEPT  12/19/2023 Gloria Siegel PA-C

## 2024-01-18 RX ORDER — SUCRALFATE 1 G/1
TABLET ORAL
Qty: 270 TABLET | Refills: 3 | OUTPATIENT
Start: 2024-01-18

## 2024-01-18 RX ORDER — LEVOTHYROXINE SODIUM 88 UG/1
88 TABLET ORAL DAILY
Qty: 90 TABLET | Refills: 3 | OUTPATIENT
Start: 2024-01-18

## 2024-01-18 RX ORDER — ALLOPURINOL 100 MG/1
100 TABLET ORAL 2 TIMES DAILY
Qty: 180 TABLET | Refills: 3 | OUTPATIENT
Start: 2024-01-18

## 2024-01-29 ENCOUNTER — HOSPITAL ENCOUNTER (OUTPATIENT)
Dept: CARDIOLOGY | Facility: HOSPITAL | Age: 84
Discharge: HOME | End: 2024-01-29
Admitting: INTERNAL MEDICINE
Payer: MEDICARE

## 2024-01-29 VITALS
HEART RATE: 70 BPM | RESPIRATION RATE: 18 BRPM | SYSTOLIC BLOOD PRESSURE: 127 MMHG | DIASTOLIC BLOOD PRESSURE: 71 MMHG | OXYGEN SATURATION: 93 %

## 2024-01-29 DIAGNOSIS — Z95.818 PRESENCE OF WATCHMAN LEFT ATRIAL APPENDAGE CLOSURE DEVICE: ICD-10-CM

## 2024-01-29 DIAGNOSIS — I48.19 PERSISTENT ATRIAL FIBRILLATION: ICD-10-CM

## 2024-01-29 LAB — BH CV VAS BP RIGHT ARM: NORMAL MMHG

## 2024-01-29 PROCEDURE — 93312 ECHO TRANSESOPHAGEAL: CPT | Performed by: INTERNAL MEDICINE

## 2024-01-29 PROCEDURE — 76376 3D RENDER W/INTRP POSTPROCES: CPT | Performed by: INTERNAL MEDICINE

## 2024-01-29 PROCEDURE — 93325 DOPPLER ECHO COLOR FLOW MAPG: CPT | Performed by: INTERNAL MEDICINE

## 2024-01-29 PROCEDURE — 93320 DOPPLER ECHO COMPLETE: CPT | Performed by: INTERNAL MEDICINE

## 2024-01-29 PROCEDURE — 93320 DOPPLER ECHO COMPLETE: CPT

## 2024-01-29 PROCEDURE — 93325 DOPPLER ECHO COLOR FLOW MAPG: CPT

## 2024-01-29 PROCEDURE — 93312 ECHO TRANSESOPHAGEAL: CPT

## 2024-01-29 PROCEDURE — 76376 3D RENDER W/INTRP POSTPROCES: CPT

## 2024-01-29 PROCEDURE — 25010000002 MIDAZOLAM PER 1 MG: Performed by: INTERNAL MEDICINE

## 2024-01-29 RX ORDER — MIDAZOLAM HYDROCHLORIDE 1 MG/ML
INJECTION INTRAMUSCULAR; INTRAVENOUS
Status: COMPLETED | OUTPATIENT
Start: 2024-01-29 | End: 2024-01-29

## 2024-01-29 RX ADMIN — METHOHEXITAL SODIUM 20 MG: 500 INJECTION, POWDER, LYOPHILIZED, FOR SOLUTION INTRAMUSCULAR; INTRAVENOUS; RECTAL at 09:18

## 2024-01-29 RX ADMIN — METHOHEXITAL SODIUM 20 MG: 500 INJECTION, POWDER, LYOPHILIZED, FOR SOLUTION INTRAMUSCULAR; INTRAVENOUS; RECTAL at 09:15

## 2024-01-29 RX ADMIN — MIDAZOLAM 2 MG: 1 INJECTION INTRAMUSCULAR; INTRAVENOUS at 09:15

## 2024-01-29 NOTE — H&P
Reg Guerra  [id number]  1940   LOS: 0 days   Patient Care Team:  Gloria Siegel PA-C as PCP - General (Physician Assistant)  Fredy Yee MD as Consulting Physician (Cardiology)  Vicente Rodriguez DO as Consulting Physician (Cardiology)    Mr. Guerra is an 83-year-old  white male from [place], [place].    Chief Complaint: 45-day LA status post Watchman device implant    Problem List:  Chronic atrial fibrillation  CHADsVasc 5, unable to tolerate anticoagulation due to recurrent epistaxis, HASBLED 4  Remote AV node ablation/VVI pacemaker  Status post Watchman device implant 12/13/2023  Coronary artery disease  Status post CABG in 2005  Lexiscan stress test 2/23/2016: EF 59%, moderate size mild in severity lateral reversible ischemia  Echocardiogram 8/30/2017: LVEF 55-60%, concentric LVH, aortic sclerosis, mild MR  Hypertension  Hyperlipidemia  Hypothyroidism, on replacement therapy  Gout  GERD  BPH  DJD  History of rheumatic fever as a child  Surgical history:  Appendectomy  Watchman device implant December 2023  Pacemaker implant  CABG  Lithotripsy  Knee surgery  Mandible fracture  Nissen fundoplication  TURP  Ventral hernia repair    No Known Allergies  (Not in a hospital admission)    Scheduled Meds:  Continuous Infusions:No current facility-administered medications for this encounter.    PRN Meds:.       History of Present Illness:   This is an 83-year-old white male who presents to [facility] 1/29/2024 for 45-day LA status post Watchman device implant.  The patient denies any chest pain, shortness of breath, palpitations, dizziness, presyncope, or syncope.  He is NPO.  He does not have any loose teeth.  He has not been able to be on any anticoagulants other than aspirin and Plavix because remotely he got kicked in the face by a horse and says that he bled profusely requiring transfusions and was at [facility].    Cardiac risk factors: advanced age (older than 55 for men, 65 for  women), dyslipidemia, hypertension, male gender, and sedentary lifestyle.    Social History     Socioeconomic History    Marital status:     Number of children: 2    Highest education level: 12th grade   Tobacco Use    Smoking status: Never    Smokeless tobacco: Never   Vaping Use    Vaping Use: Never used   Substance and Sexual Activity    Alcohol use: Never    Drug use: Never    Sexual activity: Defer     Family History   Problem Relation Age of Onset    Pancreatic cancer Father     Heart attack Sister     Diabetes Sister     Hypertension Sister        Review of Systems  10 point review of systems was completed, positives outlined in the HPI, and otherwise all other systems are negative.      Objective:       Physical Exam  /79   Pulse 70   Resp 18   SpO2 95%   There were no vitals filed for this visit.  There is no height or weight on file to calculate BMI.  No intake or output data in the 24 hours ending 01/29/24 0831    General Appearance:  Alert, cooperative, no distress, appears stated age   Head:  Normocephalic, without obvious abnormality, atraumatic   Neck: Supple, symmetrical, trachea midline, no adenopathy, thyroid: not enlarged, symmetric, no tenderness/mass/nodules, no carotid bruit or JVD   Lungs:   Clear to auscultation bilaterally, respirations unlabored   Heart:  Intermittently paced regular rate and rhythm with occasional PVC, S1, S2 normal, grade 1/6  murmur, rub or gallop   Abdomen:   Soft, nontender, no masses, no organomegaly, bowel sounds audible x4   Extremities: No edema, normal range of motion   Pulses: 2+ and symmetric   Skin: Skin color, texture, turgor normal, no rashes or lesions   Neurologic: Normal       Cardiographics  EKG: Intermittently paced on telemetry    Imaging  CTA chest 12/06/2023:  1. Cardiac enlargement without pericardial effusion.  2 . No anomalous pulmonary venous return.   3. Left atrial appendage well-opacified without significant thrombus burden.    4. Esophagus traverses the chest posterior to the left atrium, with large hiatal hernia noted including intrathoracic stomach with the gastric lumen interposed between the esophagus and left atrium over much of its course as above.    Lab Review                           Assessment:   Patient is here for 45-day LA status post Watchman device implant; procedure, risks, and complications discussed with the patient and he is agreeable to proceed.            Plan:   45-day LA status post Watchman device implant; patient will continue to stay on Plavix 75 mg daily, aspirin 81 mg daily until 6/13/2023, and then patient will discontinue Plavix at that point and be on aspirin indefinitely   Follow-up with Dr. Rodriguez for 3/11/2024 appt       Electronically signed by TEODORA Cornelius, 01/29/24, 8:42 AM EST.       Limited echo views obtained.  Transgastric images not obtained secondary to resistance felt at the gastroesophageal junction.  Watchman device is well-seated with no significant periprosthetic flow.  Continue dual antiplatelet therapy at this time.

## 2024-02-09 ENCOUNTER — TELEPHONE (OUTPATIENT)
Dept: FAMILY MEDICINE CLINIC | Facility: CLINIC | Age: 84
End: 2024-02-09

## 2024-02-09 ENCOUNTER — OFFICE VISIT (OUTPATIENT)
Dept: FAMILY MEDICINE CLINIC | Facility: CLINIC | Age: 84
End: 2024-02-09
Payer: MEDICARE

## 2024-02-09 VITALS
BODY MASS INDEX: 27.16 KG/M2 | SYSTOLIC BLOOD PRESSURE: 126 MMHG | DIASTOLIC BLOOD PRESSURE: 80 MMHG | OXYGEN SATURATION: 96 % | WEIGHT: 194 LBS | HEART RATE: 70 BPM | HEIGHT: 71 IN

## 2024-02-09 DIAGNOSIS — K21.9 GASTROESOPHAGEAL REFLUX DISEASE, UNSPECIFIED WHETHER ESOPHAGITIS PRESENT: ICD-10-CM

## 2024-02-09 DIAGNOSIS — I10 HYPERTENSION, ESSENTIAL: Primary | ICD-10-CM

## 2024-02-09 DIAGNOSIS — M1A.9XX0 CHRONIC GOUT WITHOUT TOPHUS, UNSPECIFIED CAUSE, UNSPECIFIED SITE: ICD-10-CM

## 2024-02-09 DIAGNOSIS — E78.2 HYPERLIPIDEMIA, MIXED: ICD-10-CM

## 2024-02-09 DIAGNOSIS — R73.9 HYPERGLYCEMIA: ICD-10-CM

## 2024-02-09 PROCEDURE — 1160F RVW MEDS BY RX/DR IN RCRD: CPT | Performed by: PHYSICIAN ASSISTANT

## 2024-02-09 PROCEDURE — 1159F MED LIST DOCD IN RCRD: CPT | Performed by: PHYSICIAN ASSISTANT

## 2024-02-09 PROCEDURE — 3079F DIAST BP 80-89 MM HG: CPT | Performed by: PHYSICIAN ASSISTANT

## 2024-02-09 PROCEDURE — 99214 OFFICE O/P EST MOD 30 MIN: CPT | Performed by: PHYSICIAN ASSISTANT

## 2024-02-09 PROCEDURE — 3074F SYST BP LT 130 MM HG: CPT | Performed by: PHYSICIAN ASSISTANT

## 2024-02-09 RX ORDER — LEVOTHYROXINE SODIUM 88 UG/1
88 TABLET ORAL DAILY
Qty: 90 TABLET | Refills: 1 | Status: SHIPPED | OUTPATIENT
Start: 2024-02-09

## 2024-02-09 RX ORDER — PANTOPRAZOLE SODIUM 40 MG/1
40 TABLET, DELAYED RELEASE ORAL DAILY
Qty: 90 TABLET | Refills: 1 | Status: SHIPPED | OUTPATIENT
Start: 2024-02-09

## 2024-02-09 RX ORDER — OMEPRAZOLE 40 MG/1
40 CAPSULE, DELAYED RELEASE ORAL DAILY
Qty: 90 CAPSULE | Refills: 1 | Status: CANCELLED | OUTPATIENT
Start: 2024-02-09

## 2024-02-09 RX ORDER — SUCRALFATE 1 G/1
TABLET ORAL
Qty: 180 TABLET | Refills: 1 | Status: SHIPPED | OUTPATIENT
Start: 2024-02-09

## 2024-02-09 RX ORDER — ALLOPURINOL 100 MG/1
100 TABLET ORAL 2 TIMES DAILY
Qty: 180 TABLET | Refills: 1 | Status: SHIPPED | OUTPATIENT
Start: 2024-02-09

## 2024-02-09 NOTE — TELEPHONE ENCOUNTER
Please let him know I sent in his med refills- however since he has recently been started on plavix, I did switch his omeprazole to protonix which may potentially play nicer with this medication for the long run-  but should work equally as well; thanks

## 2024-02-10 LAB
ALBUMIN SERPL-MCNC: 4.2 G/DL (ref 3.7–4.7)
ALBUMIN/GLOB SERPL: 1.4 {RATIO} (ref 1.2–2.2)
ALP SERPL-CCNC: 90 IU/L (ref 44–121)
ALT SERPL-CCNC: 15 IU/L (ref 0–44)
AST SERPL-CCNC: 21 IU/L (ref 0–40)
BASOPHILS # BLD AUTO: 0.1 X10E3/UL (ref 0–0.2)
BASOPHILS NFR BLD AUTO: 1 %
BILIRUB SERPL-MCNC: 0.5 MG/DL (ref 0–1.2)
BUN SERPL-MCNC: 14 MG/DL (ref 8–27)
BUN/CREAT SERPL: 14 (ref 10–24)
CALCIUM SERPL-MCNC: 9.3 MG/DL (ref 8.6–10.2)
CHLORIDE SERPL-SCNC: 102 MMOL/L (ref 96–106)
CHOLEST SERPL-MCNC: 125 MG/DL (ref 100–199)
CO2 SERPL-SCNC: 21 MMOL/L (ref 20–29)
CREAT SERPL-MCNC: 1.01 MG/DL (ref 0.76–1.27)
EGFRCR SERPLBLD CKD-EPI 2021: 74 ML/MIN/1.73
EOSINOPHIL # BLD AUTO: 0.4 X10E3/UL (ref 0–0.4)
EOSINOPHIL NFR BLD AUTO: 5 %
ERYTHROCYTE [DISTWIDTH] IN BLOOD BY AUTOMATED COUNT: 14.3 % (ref 11.6–15.4)
GLOBULIN SER CALC-MCNC: 3 G/DL (ref 1.5–4.5)
GLUCOSE SERPL-MCNC: 96 MG/DL (ref 70–99)
HBA1C MFR BLD: 6 % (ref 4.8–5.6)
HCT VFR BLD AUTO: 39.5 % (ref 37.5–51)
HDLC SERPL-MCNC: 45 MG/DL
HGB BLD-MCNC: 12.8 G/DL (ref 13–17.7)
IMM GRANULOCYTES # BLD AUTO: 0 X10E3/UL (ref 0–0.1)
IMM GRANULOCYTES NFR BLD AUTO: 0 %
LDLC SERPL CALC-MCNC: 61 MG/DL (ref 0–99)
LYMPHOCYTES # BLD AUTO: 1.8 X10E3/UL (ref 0.7–3.1)
LYMPHOCYTES NFR BLD AUTO: 25 %
MCH RBC QN AUTO: 29.5 PG (ref 26.6–33)
MCHC RBC AUTO-ENTMCNC: 32.4 G/DL (ref 31.5–35.7)
MCV RBC AUTO: 91 FL (ref 79–97)
MONOCYTES # BLD AUTO: 0.6 X10E3/UL (ref 0.1–0.9)
MONOCYTES NFR BLD AUTO: 8 %
NEUTROPHILS # BLD AUTO: 4.4 X10E3/UL (ref 1.4–7)
NEUTROPHILS NFR BLD AUTO: 61 %
PLATELET # BLD AUTO: 290 X10E3/UL (ref 150–450)
POTASSIUM SERPL-SCNC: 4.1 MMOL/L (ref 3.5–5.2)
PROT SERPL-MCNC: 7.2 G/DL (ref 6–8.5)
RBC # BLD AUTO: 4.34 X10E6/UL (ref 4.14–5.8)
SODIUM SERPL-SCNC: 137 MMOL/L (ref 134–144)
TRIGL SERPL-MCNC: 105 MG/DL (ref 0–149)
TSH SERPL DL<=0.005 MIU/L-ACNC: 1.11 UIU/ML (ref 0.45–4.5)
URATE SERPL-MCNC: 3.4 MG/DL (ref 3.8–8.4)
VLDLC SERPL CALC-MCNC: 19 MG/DL (ref 5–40)
WBC # BLD AUTO: 7.3 X10E3/UL (ref 3.4–10.8)

## 2024-02-11 NOTE — PROGRESS NOTES
"Chief Complaint  THYROID (Med check )    Subjective          Reg Guerra presents to [facility] PRIMARY [facility]  History of Present Illness  Patient in today for medication recheck and refills. States has been feeling well. States blood pressure has been doing well. Denies chest pain or shortness of breath. States has decreased sucralfate to twice a day and symptoms well controlled.States reflux also well controlled.  He continues to follow up with cardiology after having Watchman procedure done and sees urology for prostate exam and PSA level rechecks.   Hypothyroidism  This is a chronic problem. Pertinent negatives include no abdominal pain, chest pain, congestion, coughing, fatigue, fever, nausea, sore throat or vomiting.   Heartburn  He complains of heartburn. He reports no abdominal pain, no chest pain, no coughing, no dysphagia, no hoarse voice, no nausea or no sore throat. Pertinent negatives include no fatigue.       Objective   Vital Signs:   /80   Pulse 70   Ht 180.3 cm (71\")   Wt 88 kg (194 lb)   SpO2 96%   BMI 27.06 kg/m²     Body mass index is 27.06 kg/m².    Review of Systems   Constitutional:  Negative for fatigue and fever.   HENT:  Negative for congestion, hoarse voice and sore throat.    Respiratory:  Negative for cough and shortness of breath.    Cardiovascular:  Negative for chest pain and palpitations.   Gastrointestinal:  Positive for GERD. Negative for abdominal pain, blood in stool, constipation, diarrhea, dysphagia, nausea and vomiting.   Genitourinary:  Negative for dysuria and frequency.   Neurological:  Negative for dizziness and headache.       Past History:  Medical History: has a past medical history of Acquired hypothyroidism, Allergic rhinitis due to pollen, Appendicitis, Atherosclerotic heart disease of native coronary artery with other forms of angina pectoris, Benign prostate hyperplasia, Bilateral cataracts, BMI 28.0-28.9,adult, Chronic atrial " fibrillation, Chronic gout, unspecified, without tophus (tophi), Chronic gouty arthritis, Chronic low back pain, Diabetes, DJD (degenerative joint disease), Dyslipidemia, Essential hypertension, Gastroesophageal reflux disease without esophagitis, Hiatal hernia, History of rheumatic fever as a child, Hyperlipidemia, Knee injury, Mixed hyperlipidemia, Obesity, Peptic ulcer disease, and Presence of cardiac pacemaker.   Surgical History: has a past surgical history that includes Knee surgery (1960); Appendectomy (1957); Ventral hernia repair; Kidney stone surgery; Cardiac pacemaker placement; Nissen fundoplication; TURP / transurethral incision / drainage prostate; Mandible fracture surgery (2009); Bypass Graft; Colonoscopy; and Atrial Appendage Exclusion Left (N/A, 12/13/2023).   Family History: family history includes Diabetes in his sister; Heart attack in his sister; Hypertension in his sister; Pancreatic cancer in his father.   Social History: reports that he has never smoked. He has never used smokeless tobacco. He reports that he does not drink alcohol and does not use drugs.      Current Outpatient Medications:     allopurinol (ZYLOPRIM) 100 MG tablet, Take 1 tablet by mouth 2 (Two) Times a Day., Disp: 180 tablet, Rfl: 1    amLODIPine (NORVASC) 5 MG tablet, Take 1 tablet by mouth Daily., Disp: 90 tablet, Rfl: 3    aspirin 81 MG EC tablet, Take 1 tablet by mouth Daily., Disp: 90 tablet, Rfl: 3    chlorthalidone (HYGROTON) 25 MG tablet, Take 1 tablet by mouth Daily., Disp: 90 tablet, Rfl: 3    clopidogrel (PLAVIX) 75 MG tablet, Take 1 tablet by mouth Daily., Disp: 30 tablet, Rfl: 6    finasteride (PROSCAR) 5 MG tablet, Take 1 tablet by mouth Daily., Disp: , Rfl:     hydrALAZINE (APRESOLINE) 50 MG tablet, Take 1 tablet by mouth 2 (Two) Times a Day., Disp: 180 tablet, Rfl: 3    levothyroxine (SYNTHROID, LEVOTHROID) 88 MCG tablet, Take 1 tablet by mouth Daily., Disp: 90 tablet, Rfl: 1    rosuvastatin (CRESTOR) 40 MG  tablet, Take 1 tablet by mouth Daily., Disp: 90 tablet, Rfl: 3    sucralfate (CARAFATE) 1 g tablet, Take one tablet by mouth 2 times daily on an empty stomach 1 hour before meals, Disp: 180 tablet, Rfl: 1    tamsulosin (FLOMAX) 0.4 MG capsule 24 hr capsule, Take 1 capsule by mouth Daily. 1/2 HOUR FOLLOWING THE SAME MEAL EACH DAY, Disp: , Rfl:     pantoprazole (Protonix) 40 MG EC tablet, Take 1 tablet by mouth Daily., Disp: 90 tablet, Rfl: 1  Allergies: Patient has no known allergies.    Physical Exam  Constitutional:       Appearance: Normal appearance.   HENT:      Right Ear: Tympanic membrane normal.      Left Ear: Tympanic membrane normal.      Mouth/Throat:      Pharynx: Oropharynx is clear.   Eyes:      Conjunctiva/sclera: Conjunctivae normal.      Pupils: Pupils are equal, round, and reactive to light.   Cardiovascular:      Rate and Rhythm: Normal rate and regular rhythm.      Heart sounds: Normal heart sounds.   Pulmonary:      Effort: Pulmonary effort is normal.      Breath sounds: Normal breath sounds.   Abdominal:      Palpations: Abdomen is soft.      Tenderness: There is no abdominal tenderness.   Neurological:      Mental Status: He is oriented to person, place, and time.   Psychiatric:         Mood and Affect: Mood normal.         Behavior: Behavior normal.             Assessment and Plan   Diagnoses and all orders for this visit:    1. Hypertension, essential (Primary)  -     CBC & Differential  -     Comprehensive Metabolic Panel  -     TSH  Continue current medication and f/up with cardiology as directed. Encouraged healthy diet and exercise. RTC prior to recheck as needed.  2. Hyperlipidemia, mixed  -     Lipid Panel  Continue rosuvastatin. Fasting lipid panel with labs.   3. Chronic gout without tophus, unspecified cause, unspecified site  -     Uric Acid  Refilled allopurinol.   4. Gastroesophageal reflux disease, unspecified whether esophagitis present  With now being on plavix, will switch  omeprazole to protonix for GERD.   5. Hyperglycemia  -     Hemoglobin A1c  Will check hga1c with labs.   Other orders  -     sucralfate (CARAFATE) 1 g tablet; Take one tablet by mouth 2 times daily on an empty stomach 1 hour before meals  Dispense: 180 tablet; Refill: 1  -     levothyroxine (SYNTHROID, LEVOTHROID) 88 MCG tablet; Take 1 tablet by mouth Daily.  Dispense: 90 tablet; Refill: 1  -     allopurinol (ZYLOPRIM) 100 MG tablet; Take 1 tablet by mouth 2 (Two) Times a Day.  Dispense: 180 tablet; Refill: 1  -     pantoprazole (Protonix) 40 MG EC tablet; Take 1 tablet by mouth Daily.  Dispense: 90 tablet; Refill: 1            Follow Up   Return in about 6 months (around 8/9/2024) for Medicare Wellness, Recheck.  Patient was given instructions and counseling regarding his condition or for health maintenance advice. Please see specific information pulled into the AVS if appropriate.     Gloria Siegel PA-C

## 2024-02-12 ENCOUNTER — TELEPHONE (OUTPATIENT)
Dept: FAMILY MEDICINE CLINIC | Facility: CLINIC | Age: 84
End: 2024-02-12
Payer: MEDICARE

## 2024-03-05 PROBLEM — Z95.818 PRESENCE OF WATCHMAN LEFT ATRIAL APPENDAGE CLOSURE DEVICE: Status: ACTIVE | Noted: 2024-03-05

## 2024-03-08 ENCOUNTER — TELEPHONE (OUTPATIENT)
Dept: FAMILY MEDICINE CLINIC | Facility: CLINIC | Age: 84
End: 2024-03-08

## 2024-03-08 NOTE — TELEPHONE ENCOUNTER
Spoke with patient. He has an appointment with cardiology on 3/11/24 and will discuss whether he can take the two together.

## 2024-03-08 NOTE — TELEPHONE ENCOUNTER
Caller: Reg Guerra Springer    Relationship to patient: Self    Best call back number: [phone]     Patient is needing: PATIENT IS REQUESTING A CALL BACK FROM [facility]Y MONTES DUE TO MEDICATION ISSUES. HE STATES HE BELIEVES HE HAS TAKEN SOME OF HIS MEDICATIONS INCORRECTLY AND IT MAY BE CAUSING HIS DIARRHEA.     PLEASE CALL PATIENT BACK, IF NO ANSWER LEAVE A DETAILED MESSAGE.

## 2024-03-08 NOTE — TELEPHONE ENCOUNTER
Spoke with patient. He states that he feels like the new medication Pantoprazole is causing him to have diarrhea and he would like to switch back to Omeprazole.     Thanks.

## 2024-03-11 ENCOUNTER — OFFICE VISIT (OUTPATIENT)
Dept: CARDIOLOGY | Facility: CLINIC | Age: 84
End: 2024-03-11
Payer: MEDICARE

## 2024-03-11 VITALS
DIASTOLIC BLOOD PRESSURE: 72 MMHG | HEIGHT: 71 IN | SYSTOLIC BLOOD PRESSURE: 124 MMHG | BODY MASS INDEX: 27.55 KG/M2 | HEART RATE: 74 BPM | WEIGHT: 196.8 LBS | OXYGEN SATURATION: 99 %

## 2024-03-11 DIAGNOSIS — I44.2 CHB (COMPLETE HEART BLOCK): ICD-10-CM

## 2024-03-11 DIAGNOSIS — I10 PRIMARY HYPERTENSION: ICD-10-CM

## 2024-03-11 DIAGNOSIS — Z95.0 PRESENCE OF CARDIAC PACEMAKER: ICD-10-CM

## 2024-03-11 DIAGNOSIS — Z95.818 PRESENCE OF WATCHMAN LEFT ATRIAL APPENDAGE CLOSURE DEVICE: ICD-10-CM

## 2024-03-11 DIAGNOSIS — I48.20 ATRIAL FIBRILLATION, CHRONIC: Primary | ICD-10-CM

## 2024-03-11 PROBLEM — R04.0 BLEEDING NOSE: Status: RESOLVED | Noted: 2023-11-27 | Resolved: 2024-03-11

## 2024-03-11 PROCEDURE — 99213 OFFICE O/P EST LOW 20 MIN: CPT | Performed by: PHYSICIAN ASSISTANT

## 2024-03-11 PROCEDURE — 1159F MED LIST DOCD IN RCRD: CPT | Performed by: PHYSICIAN ASSISTANT

## 2024-03-11 PROCEDURE — 3078F DIAST BP <80 MM HG: CPT | Performed by: PHYSICIAN ASSISTANT

## 2024-03-11 PROCEDURE — 1160F RVW MEDS BY RX/DR IN RCRD: CPT | Performed by: PHYSICIAN ASSISTANT

## 2024-03-11 PROCEDURE — 3074F SYST BP LT 130 MM HG: CPT | Performed by: PHYSICIAN ASSISTANT

## 2024-03-11 PROCEDURE — 93279 PRGRMG DEV EVAL PM/LDLS PM: CPT | Performed by: PHYSICIAN ASSISTANT

## 2024-03-11 NOTE — LETTER
March 11, 2024     Gloria Siegel PA-C  [address] [facility] [address]    Patient: Reg Guerra   YOB: 1940   Date of Visit: 3/11/2024     Dear Gloria Siegel PA-C:       Thank you for referring Reg Guerra to me for evaluation. Below are the relevant portions of my assessment and plan of care.    If you have questions, please do not hesitate to call me. I look forward to following Reg along with you.         Sincerely,        Vicente Rodriguez, DO        CC: No Recipients    Kurtis Lawson PA  03/11/24 1351  Sign when Signing Visit          Encounter Date:03/11/2024      Patient ID: Reg Guerra is a 83 y.o. male.    Gloria Siegel PA-C    Cheif Complaint EP: Atrial Fibrillation, Heart block, and Pacemaker check    PROBLEM LIST:  Patient Active Problem List    Diagnosis Date Noted   • CHB (complete heart block) 03/11/2024     Priority: High   • Atrial fibrillation, chronic 04/27/2021     Priority: High     Note Last Updated: 3/5/2024     [id number] 5   remote AV node ablation with pacemaker implantation  Left atrial appendage closure with 24 mm Watchman FLX device 12/13/2023     • Presence of Watchman left atrial appendage closure device 03/05/2024     Priority: Medium   • Coronary artery disease involving native coronary artery of native heart 04/27/2021     Priority: Medium   • Presence of cardiac pacemaker 04/27/2021     Priority: Medium     Note Last Updated: 11/27/2023     [employer] single-chamber permanent pacemaker generator change, 3/25/2020     • Primary hypertension 04/27/2021     Priority: Low   • Gastroesophageal reflux disease 07/05/2022   • Chronic pain of right knee 07/05/2022   • Primary hypothyroidism 07/05/2022   • Chronic gout without tophus 07/05/2022   • Mixed hyperlipidemia 04/27/2021               History of Present Illness  Patient presents today for follow-up with a history of chronic atrial fibrillation status post AV node  "ablation and pacemaker implantation.  Returns today for scheduled electrophysiology follow-up.  He is doing quite well.  He has no awareness of palpitations, no dizziness no syncope.  Blood pressure at home typically runs about 120 mmHg systolic.  He continues to have occult blood loss.  He has an upcoming EGD and colonoscopy and will then be evaluated by urology.  He states he is compliant with his current medical regimen reports no significant adverse side effects.    No Known Allergies    Current Outpatient Medications   Medication Instructions   • allopurinol (ZYLOPRIM) 100 mg, Oral, 2 Times Daily   • amLODIPine (NORVASC) 5 mg, Oral, Daily   • aspirin 81 mg, Oral, Daily   • chlorthalidone (HYGROTON) 25 mg, Oral, Daily   • clopidogrel (PLAVIX) 75 mg, Oral, Daily   • finasteride (PROSCAR) 5 mg, Oral, Daily   • hydrALAZINE (APRESOLINE) 50 mg, Oral, 2 Times Daily   • levothyroxine (SYNTHROID, LEVOTHROID) 88 mcg, Oral, Daily   • pantoprazole (PROTONIX) 40 mg, Oral, Daily   • rosuvastatin (CRESTOR) 40 mg, Oral, Daily   • sucralfate (CARAFATE) 1 g tablet Take one tablet by mouth 2 times daily on an empty stomach 1 hour before meals   • tamsulosin (FLOMAX) 0.4 MG capsule 24 hr capsule 1 capsule, Oral, Daily, 1/2 HOUR FOLLOWING THE SAME MEAL EACH DAY        .    Objective:     /72 (BP Location: Left arm, Patient Position: Sitting, Cuff Size: Adult)   Pulse 74   Ht 180.3 cm (71\")   Wt 89.3 kg (196 lb 12.8 oz)   SpO2 99%   BMI 27.45 kg/m²    Body mass index is 27.45 kg/m².     Constitutional:       Appearance: Well-developed.   Pulmonary:      Effort: Pulmonary effort is normal. No respiratory distress.      Breath sounds: Normal breath sounds. No wheezing. No rales.      Comments: Bases clear  Chest:      Chest wall: Not tender to palpatation.   Cardiovascular:      Normal rate. Regular rhythm.      Murmurs: There is no murmur.      No gallop.  No click. No rub.   Pulses:     Intact distal pulses.   Edema:     " Peripheral edema absent.   Musculoskeletal: Normal range of motion.       Lab Review:     Lab Results   Component Value Date    GLUCOSE 96 02/09/2024    BUN 14 02/09/2024    CREATININE 1.01 02/09/2024    EGFRRESULT 74 02/09/2024    EGFR 70.9 12/06/2023    BCR 14 02/09/2024    K 4.1 02/09/2024    CO2 21 02/09/2024    CALCIUM 9.3 02/09/2024    PROTENTOTREF 7.2 02/09/2024    ALBUMIN 4.2 02/09/2024    BILITOT 0.5 02/09/2024    AST 21 02/09/2024    ALT 15 02/09/2024     Lab Results   Component Value Date    WBC 7.3 02/09/2024    HGB 12.8 (L) 02/09/2024    HCT 39.5 02/09/2024    MCV 91 02/09/2024     02/09/2024     Lab Results   Component Value Date    TSH 1.110 02/09/2024           Procedures               Assessment:      Diagnosis Plan   1. Atrial fibrillation, chronic  Status post AV node ablation      2. Presence of Watchman left atrial appendage closure device  Watchman in situ.  Continue aspirin Plavix      3. Presence of cardiac pacemaker    This patient's Cardiac Implanted Electronic Device was manually interrogated and reprogrammed during the patient encounter today.  Iterative programming changes were manually made to determine the sensing threshold, pacing threshold, lead impedance as well as underlying cardiac rhythm.  These programming changes were not limited to but included some or all of the following when appropriate: pacing mode, programmed AV delays, blanking periods, and refractory periods.  Data obtained as a result of these manual programing changes informed the patient's CIED permanent programming.        4. Primary hypertension  Well-managed on current medical regimen      5. CHB (complete heart block)  Normal functioning single-chamber permanent pacemaker with 6 years battery life remaining        Plan:     Advance Care Planning  ACP discussion was declined by the patient. Patient does not have an advance directive, declines further assistance.           Stable cardiac status.  Continue  current medications.   in 1 year, sooner as needed.  Thank you for allowing us to participate in the care of your patient.     Electronically signed by ROMY Huitron, 03/11/24, 1:51 PM EDT.

## 2024-03-11 NOTE — PROGRESS NOTES
Encounter Date:03/11/2024      Patient ID: Reg Guerra is a 83 y.o. male.    Gloria Siegel PA-C    Cheif Complaint EP: Atrial Fibrillation, Heart block, and Pacemaker check    PROBLEM LIST:  Patient Active Problem List    Diagnosis Date Noted    CHB (complete heart block) 03/11/2024     Priority: High    Atrial fibrillation, chronic 04/27/2021     Priority: High     Note Last Updated: 3/5/2024     [id number] 5   remote AV node ablation with pacemaker implantation  Left atrial appendage closure with 24 mm Watchman FLX device 12/13/2023      Presence of Watchman left atrial appendage closure device 03/05/2024     Priority: Medium    Coronary artery disease involving native coronary artery of native heart 04/27/2021     Priority: Medium    Presence of cardiac pacemaker 04/27/2021     Priority: Medium     Note Last Updated: 11/27/2023     [employer] single-chamber permanent pacemaker generator change, 3/25/2020      Primary hypertension 04/27/2021     Priority: Low    Gastroesophageal reflux disease 07/05/2022    Chronic pain of right knee 07/05/2022    Primary hypothyroidism 07/05/2022    Chronic gout without tophus 07/05/2022    Mixed hyperlipidemia 04/27/2021               History of Present Illness  Patient presents today for follow-up with a history of chronic atrial fibrillation status post AV node ablation and pacemaker implantation.  Returns today for scheduled electrophysiology follow-up.  He is doing quite well.  He has no awareness of palpitations, no dizziness no syncope.  Blood pressure at home typically runs about 120 mmHg systolic.  He continues to have occult blood loss.  He has an upcoming EGD and colonoscopy and will then be evaluated by urology.  He states he is compliant with his current medical regimen reports no significant adverse side effects.    No Known Allergies    Current Outpatient Medications   Medication Instructions    allopurinol (ZYLOPRIM) 100 mg, Oral, 2  "Times Daily    amLODIPine (NORVASC) 5 mg, Oral, Daily    aspirin 81 mg, Oral, Daily    chlorthalidone (HYGROTON) 25 mg, Oral, Daily    clopidogrel (PLAVIX) 75 mg, Oral, Daily    finasteride (PROSCAR) 5 mg, Oral, Daily    hydrALAZINE (APRESOLINE) 50 mg, Oral, 2 Times Daily    levothyroxine (SYNTHROID, LEVOTHROID) 88 mcg, Oral, Daily    pantoprazole (PROTONIX) 40 mg, Oral, Daily    rosuvastatin (CRESTOR) 40 mg, Oral, Daily    sucralfate (CARAFATE) 1 g tablet Take one tablet by mouth 2 times daily on an empty stomach 1 hour before meals    tamsulosin (FLOMAX) 0.4 MG capsule 24 hr capsule 1 capsule, Oral, Daily, 1/2 HOUR FOLLOWING THE SAME MEAL EACH DAY        .    Objective:     /72 (BP Location: Left arm, Patient Position: Sitting, Cuff Size: Adult)   Pulse 74   Ht 180.3 cm (71\")   Wt 89.3 kg (196 lb 12.8 oz)   SpO2 99%   BMI 27.45 kg/m²    Body mass index is 27.45 kg/m².     Constitutional:       Appearance: Well-developed.   Pulmonary:      Effort: Pulmonary effort is normal. No respiratory distress.      Breath sounds: Normal breath sounds. No wheezing. No rales.      Comments: Bases clear  Chest:      Chest wall: Not tender to palpatation.   Cardiovascular:      Normal rate. Regular rhythm.      Murmurs: There is no murmur.      No gallop.  No click. No rub.   Pulses:     Intact distal pulses.   Edema:     Peripheral edema absent.   Musculoskeletal: Normal range of motion.       Lab Review:     Lab Results   Component Value Date    GLUCOSE 96 02/09/2024    BUN 14 02/09/2024    CREATININE 1.01 02/09/2024    EGFRRESULT 74 02/09/2024    EGFR 70.9 12/06/2023    BCR 14 02/09/2024    K 4.1 02/09/2024    CO2 21 02/09/2024    CALCIUM 9.3 02/09/2024    PROTENTOTREF 7.2 02/09/2024    ALBUMIN 4.2 02/09/2024    BILITOT 0.5 02/09/2024    AST 21 02/09/2024    ALT 15 02/09/2024     Lab Results   Component Value Date    WBC 7.3 02/09/2024    HGB 12.8 (L) 02/09/2024    HCT 39.5 02/09/2024    MCV 91 02/09/2024     " 02/09/2024     Lab Results   Component Value Date    TSH 1.110 02/09/2024           Procedures               Assessment:      Diagnosis Plan   1. Atrial fibrillation, chronic  Status post AV node ablation      2. Presence of Watchman left atrial appendage closure device  Watchman in situ.  Continue aspirin Plavix      3. Presence of cardiac pacemaker    This patient's Cardiac Implanted Electronic Device was manually interrogated and reprogrammed during the patient encounter today.  Iterative programming changes were manually made to determine the sensing threshold, pacing threshold, lead impedance as well as underlying cardiac rhythm.  These programming changes were not limited to but included some or all of the following when appropriate: pacing mode, programmed AV delays, blanking periods, and refractory periods.  Data obtained as a result of these manual programing changes informed the patient's CIED permanent programming.        4. Primary hypertension  Well-managed on current medical regimen      5. CHB (complete heart block)  Normal functioning single-chamber permanent pacemaker with 6 years battery life remaining        Plan:     Advance Care Planning   ACP discussion was declined by the patient. Patient does not have an advance directive, declines further assistance.           Stable cardiac status.  Continue current medications.   in 1 year, sooner as needed.  Thank you for allowing us to participate in the care of your patient.     Electronically signed by ROMY Huitron, 03/11/24, 1:51 PM EDT.

## 2024-03-15 ENCOUNTER — TELEPHONE (OUTPATIENT)
Dept: CARDIOLOGY | Facility: CLINIC | Age: 84
End: 2024-03-15
Payer: MEDICARE

## 2024-03-15 NOTE — TELEPHONE ENCOUNTER
Please advise if patient can hold plavix two days before colonoscopy on Tuesday 3/19/24. Please send clearance to [phone].

## 2024-04-09 ENCOUNTER — TELEPHONE (OUTPATIENT)
Dept: CARDIOLOGY | Facility: CLINIC | Age: 84
End: 2024-04-09
Payer: MEDICARE

## 2024-04-09 NOTE — TELEPHONE ENCOUNTER
Patient had a watchman placed on 12/13/23.    Maria Ines at Dr. Loyd's office would like to know if the patient can hold Plavix 5 days prior to having a cystoscopy with a bladder biopsy for a bladder lesion? If not, when would he be able to hold Plavix?      (P)[phone]

## 2024-04-09 NOTE — TELEPHONE ENCOUNTER
I tried to call Maria Ines back at [facility] but the office was closed. I did not get the option to leave a message or obtain their fax number.

## 2024-04-30 RX ORDER — CLOPIDOGREL BISULFATE 75 MG/1
75 TABLET ORAL DAILY
Qty: 90 TABLET | Refills: 3 | OUTPATIENT
Start: 2024-04-30

## 2024-05-10 ENCOUNTER — TELEPHONE (OUTPATIENT)
Dept: CARDIOLOGY | Facility: CLINIC | Age: 84
End: 2024-05-10
Payer: MEDICARE

## 2024-05-10 NOTE — TELEPHONE ENCOUNTER
Received a fax from [employer] [facility] [employer] requesting patient hold 81 mg ASA x5 days prior to RAKESH (cervical epidural steroid injection).       P: [phone]  F: [phone]

## 2024-05-15 RX ORDER — CLOPIDOGREL BISULFATE 75 MG/1
75 TABLET ORAL DAILY
Qty: 90 TABLET | Refills: 3 | OUTPATIENT
Start: 2024-05-15

## 2024-06-10 RX ORDER — CLOPIDOGREL BISULFATE 75 MG/1
75 TABLET ORAL DAILY
Qty: 30 TABLET | Refills: 11 | Status: SHIPPED | OUTPATIENT
Start: 2024-06-10 | End: 2024-06-17

## 2024-06-17 DIAGNOSIS — I48.19 PERSISTENT ATRIAL FIBRILLATION: Primary | ICD-10-CM

## 2024-06-17 DIAGNOSIS — Z95.818 PRESENCE OF WATCHMAN LEFT ATRIAL APPENDAGE CLOSURE DEVICE: ICD-10-CM

## 2024-06-21 ENCOUNTER — OFFICE VISIT (OUTPATIENT)
Dept: CARDIOLOGY | Facility: CLINIC | Age: 84
End: 2024-06-21
Payer: MEDICARE

## 2024-06-21 VITALS
WEIGHT: 193.8 LBS | OXYGEN SATURATION: 96 % | BODY MASS INDEX: 27.13 KG/M2 | HEIGHT: 71 IN | SYSTOLIC BLOOD PRESSURE: 120 MMHG | DIASTOLIC BLOOD PRESSURE: 68 MMHG | HEART RATE: 69 BPM

## 2024-06-21 DIAGNOSIS — E78.2 MIXED HYPERLIPIDEMIA: ICD-10-CM

## 2024-06-21 DIAGNOSIS — I25.10 CORONARY ARTERY DISEASE INVOLVING NATIVE CORONARY ARTERY OF NATIVE HEART WITHOUT ANGINA PECTORIS: ICD-10-CM

## 2024-06-21 DIAGNOSIS — I48.20 ATRIAL FIBRILLATION, CHRONIC: Primary | ICD-10-CM

## 2024-06-21 DIAGNOSIS — I44.2 CHB (COMPLETE HEART BLOCK): ICD-10-CM

## 2024-06-21 DIAGNOSIS — I10 PRIMARY HYPERTENSION: ICD-10-CM

## 2024-06-21 RX ORDER — CHLORTHALIDONE 25 MG/1
25 TABLET ORAL DAILY
Qty: 90 TABLET | Refills: 3 | Status: SHIPPED | OUTPATIENT
Start: 2024-06-21

## 2024-06-21 RX ORDER — ASPIRIN 81 MG/1
81 TABLET ORAL DAILY
Qty: 90 TABLET | Refills: 3 | Status: SHIPPED | OUTPATIENT
Start: 2024-06-21

## 2024-06-21 RX ORDER — AMLODIPINE BESYLATE 5 MG/1
5 TABLET ORAL DAILY
Qty: 90 TABLET | Refills: 3 | Status: SHIPPED | OUTPATIENT
Start: 2024-06-21

## 2024-06-21 RX ORDER — ROSUVASTATIN CALCIUM 40 MG/1
40 TABLET, COATED ORAL DAILY
Qty: 90 TABLET | Refills: 3 | Status: SHIPPED | OUTPATIENT
Start: 2024-06-21

## 2024-06-21 RX ORDER — HYDRALAZINE HYDROCHLORIDE 50 MG/1
50 TABLET, FILM COATED ORAL 2 TIMES DAILY
Qty: 180 TABLET | Refills: 3 | Status: SHIPPED | OUTPATIENT
Start: 2024-06-21

## 2024-06-21 NOTE — PROGRESS NOTES
[facility] [place]  [facility]  [address] [place] [address]  Dept: [phone]  Dept Fax: [phone]    Reg Guerra  1940    Follow Up Office Visit Note    History of Present Illness:  Reg Guerra is a 84 y.o. male who presents to the clinic for Follow-up.CAD- s/p CABG in 20005, normal Ef denies any complaints, has stress nuclear 2020 with inferior lateral reversible but no complaints on ASA, will keep medical treatment BP is 120.80, he has AF and s.p Watchman device    The following portions of the patient's history were reviewed and updated as appropriate: allergies, current medications, past family history, past medical history, past social history, past surgical history, and problem list.    Medications:  allopurinol  amLODIPine  aspirin  chlorthalidone  finasteride  hydrALAZINE  levothyroxine  rosuvastatin  tamsulosin capsule    Subjective  No Known Allergies     Past Medical History:   Diagnosis Date    Acquired hypothyroidism     Allergic rhinitis due to pollen     Appendicitis     Atherosclerotic heart disease of native coronary artery with other forms of angina pectoris     Benign prostate hyperplasia     Bilateral cataracts     BMI 28.0-28.9,adult     Chronic atrial fibrillation     Chronic gout, unspecified, without tophus (tophi)     Chronic gouty arthritis     Chronic low back pain     Diabetes     DJD (degenerative joint disease)     Dyslipidemia     Essential hypertension     Gastroesophageal reflux disease without esophagitis     Hiatal hernia     History of rheumatic fever as a child     Hyperlipidemia     Knee injury     Mixed hyperlipidemia     Obesity     Peptic ulcer disease     Presence of cardiac pacemaker        Past Surgical History:   Procedure Laterality Date    APPENDECTOMY  1957    ATRIAL APPENDAGE EXCLUSION LEFT WITH TRANSESOPHAGEAL ECHOCARDIOGRAM N/A 12/13/2023    Procedure: Atrial Appendage Occlusion;  Surgeon: Jennifer  "Vicente HOPPER DO;  Location: [facility] INVASIVE LOCATION;  Service: Cardiology;  Laterality: N/A;    BYPASS GRAFT      CARDIAC PACEMAKER PLACEMENT      VVI    COLONOSCOPY      KIDNEY STONE SURGERY      LITHOTRIPSY    KNEE SURGERY  1960    MANDIBLE FRACTURE SURGERY  2009    KICKED BY HORSE    NISSEN FUNDOPLICATION      TURP / TRANSURETHRAL INCISION / DRAINAGE PROSTATE      VENTRAL HERNIA REPAIR         Family History   Problem Relation Age of Onset    Pancreatic cancer Father     Heart attack Sister     Diabetes Sister     Hypertension Sister         Social History     Socioeconomic History    Marital status:     Number of children: 2    Highest education level: 12th grade   Tobacco Use    Smoking status: Never     Passive exposure: Never    Smokeless tobacco: Never   Vaping Use    Vaping status: Never Used   Substance and Sexual Activity    Alcohol use: Never    Drug use: Never    Sexual activity: Defer       Review of Systems   Constitutional: Negative.    HENT: Negative.     Respiratory: Negative.     Cardiovascular: Negative.    Endocrine: Negative.    Genitourinary: Negative.    Musculoskeletal: Negative.    Skin: Negative.    Allergic/Immunologic: Negative.    Neurological: Negative.    Hematological: Negative.    Psychiatric/Behavioral: Negative.         Cardiovascular Procedures    ECHO/MUGA:  STRESS TESTS:   CARDIAC CATH:   DEVICES:   HOLTER:   CT/MRI:   VASCULAR:   CARDIOTHORACIC:     Objective  Vitals:    06/21/24 1008   BP: 120/68   BP Location: Right arm   Cuff Size: Adult   Pulse: 69   SpO2: 96%   Weight: 87.9 kg (193 lb 12.8 oz)   Height: 180.3 cm (71\")     Body mass index is 27.03 kg/m².     Physical Exam  Vitals reviewed.   Constitutional:       Appearance: Healthy appearance. Not in distress.   Neck:      Vascular: No JVR. JVD normal.   Pulmonary:      Effort: Pulmonary effort is normal.      Breath sounds: Normal breath sounds. No wheezing. No rhonchi. No rales.   Chest:      Chest wall: Not " tender to palpatation.   Cardiovascular:      PMI at left midclavicular line. Normal rate. Regular rhythm. Normal S1. Normal S2.       Murmurs: There is no murmur.      No gallop.  No click. No rub.   Pulses:     Intact distal pulses.   Edema:     Peripheral edema absent.   Abdominal:      General: Bowel sounds are normal.      Palpations: Abdomen is soft.      Tenderness: There is no abdominal tenderness.   Musculoskeletal: Normal range of motion.         General: No tenderness. Skin:     General: Skin is warm and dry.   Neurological:      General: No focal deficit present.      Mental Status: Alert and oriented to person, place and time.        Diagnostic Data    ECG 12 Lead    Date/Time: 6/21/2024 10:33 AM  Performed by: Fredy Yee MD    Authorized by: Fredy Yee MD  Comparison: compared with previous ECG from 11/27/2023  Similar to previous ECG  Rhythm: sinus rhythm and paced  Rate: normal  BPM: 70  QRS axis: normal  Pacing: ventricular paced rhythm  Clinical impression: abnormal EKG        Assessment and Plan  Diagnoses and all orders for this visit:    Atrial fibrillation, chronic- doing good just on asa, s./p Watchamn device    Primary hypertension- BP is 120/.60, on Hydralazine 50 mg bid Amlodipine 5 mg, and chorthalidone 25 mg    Mixed hyperlipidemia- On Crestor 40 mg, lipids are good, tolerates well    CHB (complete heart block)- s.p pacemaker    Coronary artery disease involving native coronary artery of native heart without angina pectoris- No chest pain, on Asa s/p CABG 2005 , normal EF/  Pacemaker- Seems working fine        Other orders  -     amLODIPine (NORVASC) 5 MG tablet; Take 1 tablet by mouth Daily.  -     aspirin 81 MG EC tablet; Take 1 tablet by mouth Daily.  -     rosuvastatin (CRESTOR) 40 MG tablet; Take 1 tablet by mouth Daily.         Return in about 6 months (around 12/21/2024) for Recheck with Dr. Yee.    Fredy Yee MD  06/21/2024

## 2024-06-26 RX ORDER — OMEPRAZOLE 40 MG/1
40 CAPSULE, DELAYED RELEASE ORAL DAILY
Qty: 90 CAPSULE | Refills: 3 | OUTPATIENT
Start: 2024-06-26

## 2024-07-08 ENCOUNTER — TELEPHONE (OUTPATIENT)
Dept: CARDIOLOGY | Facility: CLINIC | Age: 84
End: 2024-07-08
Payer: MEDICARE

## 2024-07-08 NOTE — TELEPHONE ENCOUNTER
Caller: JAQUELIN SNELL    Relationship: Emergency Contact    Best call back number: [phone]    What is the best time to reach you: ANY      What was the call regarding: WIFE ADVISING PATIENT IS EXPERIENCING PAIN AROUND THE PACEMAKER AND IS NOT SURE IF THIS IS SOMETHING TO BE CONCERNED ABOUT; HAS BEEN HAVING THIS PAIN FOR ABOUT 3 DAYS. PLEASE CALL THE ABOVE TO ADVISE.     Is it okay if the provider responds through MyChart: NO

## 2024-07-09 ENCOUNTER — OFFICE VISIT (OUTPATIENT)
Dept: CARDIOLOGY | Facility: CLINIC | Age: 84
End: 2024-07-09
Payer: MEDICARE

## 2024-07-09 ENCOUNTER — LAB (OUTPATIENT)
Dept: FAMILY MEDICINE CLINIC | Facility: CLINIC | Age: 84
End: 2024-07-09
Payer: MEDICARE

## 2024-07-09 ENCOUNTER — CLINICAL SUPPORT (OUTPATIENT)
Dept: CARDIOLOGY | Facility: CLINIC | Age: 84
End: 2024-07-09
Payer: MEDICARE

## 2024-07-09 VITALS
SYSTOLIC BLOOD PRESSURE: 124 MMHG | OXYGEN SATURATION: 99 % | RESPIRATION RATE: 16 BRPM | BODY MASS INDEX: 27.16 KG/M2 | DIASTOLIC BLOOD PRESSURE: 66 MMHG | HEART RATE: 70 BPM | WEIGHT: 194 LBS | HEIGHT: 71 IN

## 2024-07-09 DIAGNOSIS — E78.2 MIXED HYPERLIPIDEMIA: ICD-10-CM

## 2024-07-09 DIAGNOSIS — I25.10 CORONARY ARTERY DISEASE INVOLVING NATIVE CORONARY ARTERY OF NATIVE HEART WITHOUT ANGINA PECTORIS: ICD-10-CM

## 2024-07-09 DIAGNOSIS — I48.20 ATRIAL FIBRILLATION, CHRONIC: Primary | ICD-10-CM

## 2024-07-09 DIAGNOSIS — Z95.818 PRESENCE OF WATCHMAN LEFT ATRIAL APPENDAGE CLOSURE DEVICE: ICD-10-CM

## 2024-07-09 DIAGNOSIS — I44.2 CHB (COMPLETE HEART BLOCK): ICD-10-CM

## 2024-07-09 DIAGNOSIS — R52 PAIN IN PACEMAKER POCKET: ICD-10-CM

## 2024-07-09 DIAGNOSIS — Z95.0 PRESENCE OF CARDIAC PACEMAKER: ICD-10-CM

## 2024-07-09 DIAGNOSIS — I10 PRIMARY HYPERTENSION: ICD-10-CM

## 2024-07-09 PROCEDURE — 3078F DIAST BP <80 MM HG: CPT | Performed by: INTERNAL MEDICINE

## 2024-07-09 PROCEDURE — 1159F MED LIST DOCD IN RCRD: CPT | Performed by: INTERNAL MEDICINE

## 2024-07-09 PROCEDURE — 93000 ELECTROCARDIOGRAM COMPLETE: CPT | Performed by: INTERNAL MEDICINE

## 2024-07-09 PROCEDURE — 99214 OFFICE O/P EST MOD 30 MIN: CPT | Performed by: INTERNAL MEDICINE

## 2024-07-09 PROCEDURE — 1160F RVW MEDS BY RX/DR IN RCRD: CPT | Performed by: INTERNAL MEDICINE

## 2024-07-09 PROCEDURE — 3074F SYST BP LT 130 MM HG: CPT | Performed by: INTERNAL MEDICINE

## 2024-07-09 RX ORDER — LEVOTHYROXINE SODIUM 88 UG/1
88 TABLET ORAL DAILY
Qty: 90 TABLET | Refills: 0 | Status: SHIPPED | OUTPATIENT
Start: 2024-07-09

## 2024-07-09 RX ORDER — ALLOPURINOL 100 MG/1
100 TABLET ORAL 2 TIMES DAILY
Qty: 180 TABLET | Refills: 0 | Status: SHIPPED | OUTPATIENT
Start: 2024-07-09

## 2024-07-09 NOTE — PROGRESS NOTES
[facility] [place]  [facility] CARDIOLOGY  [address] [place] [address]  Dept: [phone]  Dept Fax: [phone]    Reg Guerra  1940    Follow Up Office Visit Note    History of Present Illness:  Reg Guerra is a 84 y.o. male who presents to the clinic for Follow-up.pain on the left side chest pacemaker area- for the last 3 days, has has left side pain,  soreness no fever, no chills, no SOB,  he has had pain before for short time but nothing like 3 days, his EKG is fine, his examen pain is outside pacer, soreness,  we discuss with Dr Guzman, we will get lab CBC, CRP., ESR and will     observe    The following portions of the patient's history were reviewed and updated as appropriate: allergies, current medications, past family history, past medical history, past social history, past surgical history, and problem list.    Medications:  allopurinol  amLODIPine  aspirin  chlorthalidone  finasteride  hydrALAZINE  levothyroxine  rosuvastatin  tamsulosin capsule    Subjective  No Known Allergies     Past Medical History:   Diagnosis Date    Acquired hypothyroidism     Allergic rhinitis due to pollen     Appendicitis     Atherosclerotic heart disease of native coronary artery with other forms of angina pectoris     Benign prostate hyperplasia     Bilateral cataracts     BMI 28.0-28.9,adult     Chronic atrial fibrillation     Chronic gout, unspecified, without tophus (tophi)     Chronic gouty arthritis     Chronic low back pain     Diabetes     DJD (degenerative joint disease)     Dyslipidemia     Essential hypertension     Gastroesophageal reflux disease without esophagitis     Hiatal hernia     History of rheumatic fever as a child     Hyperlipidemia     Knee injury     Mixed hyperlipidemia     Obesity     Peptic ulcer disease     Presence of cardiac pacemaker        Past Surgical History:   Procedure Laterality Date    APPENDECTOMY  1957    ATRIAL APPENDAGE EXCLUSION  "LEFT WITH TRANSESOPHAGEAL ECHOCARDIOGRAM N/A 12/13/2023    Procedure: Atrial Appendage Occlusion;  Surgeon: Vicente Rodriguez DO;  Location: [facility] INVASIVE LOCATION;  Service: Cardiology;  Laterality: N/A;    BYPASS GRAFT      CARDIAC PACEMAKER PLACEMENT      VVI    COLONOSCOPY      KIDNEY STONE SURGERY      LITHOTRIPSY    KNEE SURGERY  1960    MANDIBLE FRACTURE SURGERY  2009    KICKED BY HORSE    NISSEN FUNDOPLICATION      TURP / TRANSURETHRAL INCISION / DRAINAGE PROSTATE      VENTRAL HERNIA REPAIR         Family History   Problem Relation Age of Onset    Pancreatic cancer Father     Heart attack Sister     Diabetes Sister     Hypertension Sister         Social History     Socioeconomic History    Marital status:     Number of children: 2    Highest education level: 12th grade   Tobacco Use    Smoking status: Never     Passive exposure: Never    Smokeless tobacco: Never   Vaping Use    Vaping status: Never Used   Substance and Sexual Activity    Alcohol use: Never    Drug use: Never    Sexual activity: Defer       Review of Systems   Constitutional: Negative.    HENT: Negative.     Respiratory: Negative.     Cardiovascular: Negative.  Positive for chest pain.   Endocrine: Negative.    Genitourinary: Negative.    Musculoskeletal: Negative.    Skin: Negative.    Allergic/Immunologic: Negative.    Neurological: Negative.    Hematological: Negative.    Psychiatric/Behavioral: Negative.         Cardiovascular Procedures    ECHO/MUGA:  STRESS TESTS:   CARDIAC CATH:   DEVICES:   HOLTER:   CT/MRI:   VASCULAR:   CARDIOTHORACIC:     Objective  Vitals:    07/09/24 0910   BP: 124/66   BP Location: Right arm   Patient Position: Lying   Cuff Size: Adult   Pulse: 70   Resp: 16   SpO2: 99%   Weight: 88 kg (194 lb)   Height: 180.3 cm (71\")   PainSc: 0-No pain     Body mass index is 27.06 kg/m².     Physical Exam  Vitals reviewed.   Constitutional:       Appearance: Healthy appearance. Not in distress.   Eyes:      Pupils: " Pupils are equal, round, and reactive to light.   HENT:    Mouth/Throat:      Pharynx: Oropharynx is clear.   Neck:      Thyroid: Thyroid normal.      Vascular: No JVR. JVD normal.   Pulmonary:      Effort: Pulmonary effort is normal.      Breath sounds: Normal breath sounds. No wheezing. No rhonchi. No rales.   Chest:      Chest wall: Not tender to palpatation.   Cardiovascular:      PMI at left midclavicular line. Normal rate. Regular rhythm. Normal S1. Normal S2.       Murmurs: There is no murmur.      No gallop.  No click. No rub.   Pulses:     Intact distal pulses.      Carotid: 3+ bilaterally.     Radial: 3+ bilaterally.     Femoral: 3+ bilaterally.     Dorsalis pedis: 3+ bilaterally.     Posterior tibial: 3+ bilaterally.  Edema:     Peripheral edema absent.   Abdominal:      General: Bowel sounds are normal.      Palpations: Abdomen is soft.      Tenderness: There is no abdominal tenderness.   Musculoskeletal: Normal range of motion.         General: No tenderness.      Cervical back: Normal range of motion and neck supple. Skin:     General: Skin is warm and dry.   Neurological:      General: No focal deficit present.      Mental Status: Alert and oriented to person, place and time.        Diagnostic Data    ECG 12 Lead    Date/Time: 7/9/2024 9:52 AM  Performed by: Fredy Yee MD    Authorized by: Fredy Yee MD  Comparison: compared with previous ECG from 6/21/2024  Similar to previous ECG  Rhythm: atrial fibrillation and paced  Rate: normal  BPM: 70    Clinical impression: abnormal EKG        Assessment and Plan  Diagnoses and all orders for this visit:    Atrial fibrillation, chronic- s.p ablation plus pacer., on ASA s.p Watchman device    CHB (complete heart block)- s/[p pacemaker    Coronary artery disease involving native coronary artery of native heart without angina pectoris- s.p CABG in 2005 and stress nuclear in 2020 with inferolateral ischemia, no chest pain, ischemic taype  on ASA    Presence of cardiac pacemaker    Mixed hyperlipidemia- On Crestor 40 mg, will get lab, he tolerates well   -     High Sensitivity CRP  -     TSH  -     Lipid Panel    Presence of Watchman left atrial appendage closure device    Primary hypertension- BP is 110.60 on Hydralazine 50 mg bid, Amlodipine 5 mg and Chlorthalidone 25 mng    Pain in pacemaker pocket- Will get lab, CBC ESR, and also CRP  -     Comprehensive Metabolic Panel  -     CK  -     CBC & Differential  -     Sedimentation Rate  -     Troponin T         Return in about 6 months (around 1/9/2025) for Recheck with Dr. Yee.    Fredy Yee MD  07/09/2024

## 2024-07-10 LAB — ERYTHROCYTE [SEDIMENTATION RATE] IN BLOOD BY WESTERGREN METHOD: 41 MM/HR (ref 0–30)

## 2024-07-11 ENCOUNTER — TELEPHONE (OUTPATIENT)
Dept: CARDIOLOGY | Facility: CLINIC | Age: 84
End: 2024-07-11
Payer: MEDICARE

## 2024-07-11 LAB
ALBUMIN SERPL-MCNC: 4.6 G/DL (ref 3.7–4.7)
ALP SERPL-CCNC: 95 IU/L (ref 44–121)
ALT SERPL-CCNC: 16 IU/L (ref 0–44)
APO B SERPL-MCNC: 66 MG/DL
AST SERPL-CCNC: 23 IU/L (ref 0–40)
BASOPHILS # BLD AUTO: 0.1 X10E3/UL (ref 0–0.2)
BASOPHILS NFR BLD AUTO: 1 %
BILIRUB SERPL-MCNC: 0.6 MG/DL (ref 0–1.2)
BUN SERPL-MCNC: 23 MG/DL (ref 8–27)
BUN/CREAT SERPL: 20 (ref 10–24)
CALCIUM SERPL-MCNC: 9.8 MG/DL (ref 8.6–10.2)
CHLORIDE SERPL-SCNC: 99 MMOL/L (ref 96–106)
CHOLEST SERPL-MCNC: 130 MG/DL (ref 100–199)
CK SERPL-CCNC: 165 U/L (ref 30–208)
CO2 SERPL-SCNC: 23 MMOL/L (ref 20–29)
CREAT SERPL-MCNC: 1.16 MG/DL (ref 0.76–1.27)
CRP SERPL HS-MCNC: 4.38 MG/L (ref 0–3)
EGFRCR SERPLBLD CKD-EPI 2021: 62 ML/MIN/1.73
EOSINOPHIL # BLD AUTO: 0.2 X10E3/UL (ref 0–0.4)
EOSINOPHIL NFR BLD AUTO: 2 %
ERYTHROCYTE [DISTWIDTH] IN BLOOD BY AUTOMATED COUNT: 14 % (ref 11.6–15.4)
ERYTHROCYTE [SEDIMENTATION RATE] IN BLOOD BY WESTERGREN METHOD: 8 MM/HR (ref 0–30)
GLOBULIN SER CALC-MCNC: 2.8 G/DL (ref 1.5–4.5)
GLUCOSE SERPL-MCNC: 105 MG/DL (ref 70–99)
HCT VFR BLD AUTO: 45.4 % (ref 37.5–51)
HDLC SERPL-MCNC: 50 MG/DL
HGB BLD-MCNC: 14.7 G/DL (ref 13–17.7)
IMM GRANULOCYTES # BLD AUTO: 0.1 X10E3/UL (ref 0–0.1)
IMM GRANULOCYTES NFR BLD AUTO: 1 %
LDLC SERPL CALC-MCNC: 64 MG/DL (ref 0–99)
LPA SERPL-SCNC: 231.2 NMOL/L
LYMPHOCYTES # BLD AUTO: 2.1 X10E3/UL (ref 0.7–3.1)
LYMPHOCYTES NFR BLD AUTO: 20 %
MCH RBC QN AUTO: 30.9 PG (ref 26.6–33)
MCHC RBC AUTO-ENTMCNC: 32.4 G/DL (ref 31.5–35.7)
MCV RBC AUTO: 96 FL (ref 79–97)
MONOCYTES # BLD AUTO: 0.9 X10E3/UL (ref 0.1–0.9)
MONOCYTES NFR BLD AUTO: 8 %
NEUTROPHILS # BLD AUTO: 7.1 X10E3/UL (ref 1.4–7)
NEUTROPHILS NFR BLD AUTO: 68 %
PLATELET # BLD AUTO: 249 X10E3/UL (ref 150–450)
POTASSIUM SERPL-SCNC: 4.3 MMOL/L (ref 3.5–5.2)
PROT SERPL-MCNC: 7.4 G/DL (ref 6–8.5)
RBC # BLD AUTO: 4.75 X10E6/UL (ref 4.14–5.8)
SODIUM SERPL-SCNC: 140 MMOL/L (ref 134–144)
TRIGL SERPL-MCNC: 80 MG/DL (ref 0–149)
TROPONIN T SERPL HS-MCNC: 33 NG/L (ref 0–22)
TSH SERPL DL<=0.005 MIU/L-ACNC: 1.31 UIU/ML (ref 0.45–4.5)
VLDLC SERPL CALC-MCNC: 16 MG/DL (ref 5–40)
WBC # BLD AUTO: 10.3 X10E3/UL (ref 3.4–10.8)

## 2024-07-11 NOTE — TELEPHONE ENCOUNTER
Spoke with Mrs Guerra and explained that the Sed rate lab came back with 2 different results - one elevated and one normal range. She verbalized they can come tomorrow for a repeat lab.

## 2024-07-12 ENCOUNTER — CLINICAL SUPPORT (OUTPATIENT)
Dept: CARDIOLOGY | Facility: CLINIC | Age: 84
End: 2024-07-12
Payer: MEDICARE

## 2024-07-12 DIAGNOSIS — I25.10 CORONARY ARTERY DISEASE INVOLVING NATIVE CORONARY ARTERY OF NATIVE HEART WITHOUT ANGINA PECTORIS: Primary | ICD-10-CM

## 2024-07-12 DIAGNOSIS — G89.29 CHRONIC PAIN OF RIGHT KNEE: ICD-10-CM

## 2024-07-12 DIAGNOSIS — M25.561 CHRONIC PAIN OF RIGHT KNEE: ICD-10-CM

## 2024-07-12 DIAGNOSIS — I10 PRIMARY HYPERTENSION: ICD-10-CM

## 2024-07-12 NOTE — PROGRESS NOTES
Venipuncture Blood Specimen Collection  Venipuncture performed in clinic by Delicia Pitts MA with good hemostasis. Patient tolerated the procedure well without complications.   07/12/24   Delicia Pitts MA

## 2024-07-13 LAB — ERYTHROCYTE [SEDIMENTATION RATE] IN BLOOD BY WESTERGREN METHOD: 51 MM/HR (ref 0–30)

## 2024-07-13 RX ORDER — INCLISIRAN 284 MG/1.5ML
1.5 INJECTION, SOLUTION SUBCUTANEOUS ONCE
Qty: 1.5 ML | Refills: 0 | Status: SHIPPED | OUTPATIENT
Start: 2024-07-13 | End: 2024-07-13

## 2024-07-18 ENCOUNTER — TELEPHONE (OUTPATIENT)
Dept: CARDIOLOGY | Facility: CLINIC | Age: 84
End: 2024-07-18
Payer: MEDICARE

## 2024-07-18 NOTE — TELEPHONE ENCOUNTER
----- Message from Fredy Yee sent at 7/13/2024 12:11 PM EDT -----   ESR is 51, and Crp elevated, we discuss with Dr Guzman plan just watch, any fever, chills redness or worsening pain, will let us know, also need to have lab in 2 months to compare CRP,. ESR CBC

## 2024-07-18 NOTE — TELEPHONE ENCOUNTER
Left a message for Mr. Guerra that Dr العراقي advised me he spoke with you about all the lab results.  He states you wanted to try and get approved for the Leqvio.  I have sent the referral and will call you once they have advised me of the status.  Please call if any questions and ask for Marita.

## 2024-07-24 ENCOUNTER — TELEPHONE (OUTPATIENT)
Dept: CARDIOLOGY | Facility: CLINIC | Age: 84
End: 2024-07-24
Payer: MEDICARE

## 2024-07-24 NOTE — TELEPHONE ENCOUNTER
Leqvio approved and patient will have first appt with [facility] on 7/31/24

## 2024-08-19 ENCOUNTER — OFFICE VISIT (OUTPATIENT)
Dept: FAMILY MEDICINE CLINIC | Facility: CLINIC | Age: 84
End: 2024-08-19
Payer: MEDICARE

## 2024-08-19 VITALS
BODY MASS INDEX: 27.72 KG/M2 | SYSTOLIC BLOOD PRESSURE: 138 MMHG | OXYGEN SATURATION: 93 % | HEART RATE: 74 BPM | DIASTOLIC BLOOD PRESSURE: 70 MMHG | WEIGHT: 198 LBS | HEIGHT: 71 IN

## 2024-08-19 DIAGNOSIS — E03.9 PRIMARY HYPOTHYROIDISM: ICD-10-CM

## 2024-08-19 DIAGNOSIS — I48.20 ATRIAL FIBRILLATION, CHRONIC: ICD-10-CM

## 2024-08-19 DIAGNOSIS — M1A.9XX0 CHRONIC GOUT WITHOUT TOPHUS, UNSPECIFIED CAUSE, UNSPECIFIED SITE: ICD-10-CM

## 2024-08-19 DIAGNOSIS — M54.50 CHRONIC BILATERAL LOW BACK PAIN, UNSPECIFIED WHETHER SCIATICA PRESENT: ICD-10-CM

## 2024-08-19 DIAGNOSIS — I10 PRIMARY HYPERTENSION: ICD-10-CM

## 2024-08-19 DIAGNOSIS — Z00.00 ENCOUNTER FOR SUBSEQUENT ANNUAL WELLNESS VISIT (AWV) IN MEDICARE PATIENT: Primary | ICD-10-CM

## 2024-08-19 DIAGNOSIS — G89.29 CHRONIC BILATERAL LOW BACK PAIN, UNSPECIFIED WHETHER SCIATICA PRESENT: ICD-10-CM

## 2024-08-19 DIAGNOSIS — E78.2 MIXED HYPERLIPIDEMIA: ICD-10-CM

## 2024-08-19 PROCEDURE — 1170F FXNL STATUS ASSESSED: CPT | Performed by: PHYSICIAN ASSISTANT

## 2024-08-19 PROCEDURE — 1126F AMNT PAIN NOTED NONE PRSNT: CPT | Performed by: PHYSICIAN ASSISTANT

## 2024-08-19 PROCEDURE — G0439 PPPS, SUBSEQ VISIT: HCPCS | Performed by: PHYSICIAN ASSISTANT

## 2024-08-19 PROCEDURE — 3078F DIAST BP <80 MM HG: CPT | Performed by: PHYSICIAN ASSISTANT

## 2024-08-19 PROCEDURE — 3075F SYST BP GE 130 - 139MM HG: CPT | Performed by: PHYSICIAN ASSISTANT

## 2024-08-19 RX ORDER — LEVOTHYROXINE SODIUM 88 UG/1
88 TABLET ORAL DAILY
Qty: 90 TABLET | Refills: 1 | Status: SHIPPED | OUTPATIENT
Start: 2024-08-19

## 2024-08-19 RX ORDER — ALLOPURINOL 100 MG/1
100 TABLET ORAL 2 TIMES DAILY
Qty: 180 TABLET | Refills: 1 | Status: SHIPPED | OUTPATIENT
Start: 2024-08-19

## 2024-08-19 NOTE — PROGRESS NOTES
Subjective   The ABCs of the Annual Wellness Visit  Medicare Wellness Visit      Reg Guerra is a 84 y.o. patient who presents for a Medicare Wellness Visit.    Patient is also here today for medication recheck and refills.  He has already had some  labs drawn through his other specialty offices.  He states overall has been feeling well. Has had some intermittent flareups of lower back pain.  Denies any chest pain or shortness of breath.    The following portions of the patient's history were reviewed and   updated as appropriate: allergies, current medications, past family history, past medical history, past social history, past surgical history, and problem list.    Compared to one year ago, the patient's physical   health is the same.  Compared to one year ago, the patient's mental   health is the same.    Recent Hospitalizations:  This patient has had a [facility] admission record on file within the last 365 days.  Current Medical Providers:  Patient Care Team:  Gloria Siegel PA-C as PCP - General (Physician Assistant)  Fredy Yee MD as Consulting Physician (Cardiology)  Vicente Rodriguez DO as Consulting Physician (Cardiology)    Outpatient Medications Prior to Visit   Medication Sig Dispense Refill    amLODIPine (NORVASC) 5 MG tablet Take 1 tablet by mouth Daily. 90 tablet 3    aspirin 81 MG EC tablet Take 1 tablet by mouth Daily. 90 tablet 3    chlorthalidone (HYGROTON) 25 MG tablet Take 1 tablet by mouth Daily. 90 tablet 3    finasteride (PROSCAR) 5 MG tablet Take 1 tablet by mouth Daily.      hydrALAZINE (APRESOLINE) 50 MG tablet Take 1 tablet by mouth 2 (Two) Times a Day. 180 tablet 3    Inclisiran Sodium (LEQVIO SC) Inject  under the skin into the appropriate area as directed Every 3 (Three) Months.      rosuvastatin (CRESTOR) 40 MG tablet Take 1 tablet by mouth Daily. 90 tablet 3    tamsulosin (FLOMAX) 0.4 MG capsule 24 hr capsule Take 1 capsule by mouth Daily. 1/2 HOUR  "FOLLOWING THE SAME MEAL EACH DAY      allopurinol (ZYLOPRIM) 100 MG tablet TAKE 1 TABLET BY MOUTH TWICE  DAILY 180 tablet 0    levothyroxine (SYNTHROID, LEVOTHROID) 88 MCG tablet TAKE 1 TABLET BY MOUTH DAILY 90 tablet 0     No facility-administered medications prior to visit.     No opioid medication identified on active medication list. I have reviewed chart for other potential  high risk medication/s and harmful drug interactions in the elderly.      Aspirin is on active medication list.      Patient Active Problem List   Diagnosis    Coronary artery disease involving native coronary artery of native heart    Atrial fibrillation, chronic    Primary hypertension    Mixed hyperlipidemia    Presence of cardiac pacemaker    Gastroesophageal reflux disease    Chronic pain of right knee    Primary hypothyroidism    Chronic gout without tophus    Presence of Watchman left atrial appendage closure device    CHB (complete heart block)    Pain in pacemaker pocket    Encounter for subsequent annual wellness visit (AWV) in Medicare patient     Advance Care Planning Advance Directive is not on file.  ACP discussion was held with the patient during this visit. Patient does not have an advance directive, information provided.        Review of Systems   Constitutional:  Negative for fatigue.   HENT:  Negative for congestion and sore throat.    Respiratory:  Negative for cough and shortness of breath.    Cardiovascular:  Negative for chest pain.   Gastrointestinal:  Negative for abdominal pain, diarrhea, nausea and vomiting.   Genitourinary:  Negative for dysuria and frequency.   Neurological:  Negative for dizziness and headache.          Objective   Vitals:    08/19/24 0939   BP: 138/70   Pulse: 74   SpO2: 93%   Weight: 89.8 kg (198 lb)   Height: 180.3 cm (71\")       Estimated body mass index is 27.62 kg/m² as calculated from the following:    Height as of this encounter: 180.3 cm (71\").    Weight as of this encounter: 89.8 kg " (198 lb).     Physical Exam  Constitutional:       Appearance: Normal appearance.   HENT:      Right Ear: Tympanic membrane normal.      Left Ear: Tympanic membrane normal.      Mouth/Throat:      Pharynx: Oropharynx is clear.   Eyes:      Conjunctiva/sclera: Conjunctivae normal.      Pupils: Pupils are equal, round, and reactive to light.   Cardiovascular:      Rate and Rhythm: Normal rate and regular rhythm.      Heart sounds: Normal heart sounds.   Pulmonary:      Effort: Pulmonary effort is normal.      Breath sounds: Normal breath sounds.   Abdominal:      Palpations: Abdomen is soft.      Tenderness: There is no abdominal tenderness.   Musculoskeletal:      Comments: Minimal  tenderness to lower back; walks with normal gait   Neurological:      Mental Status: He is oriented to person, place, and time.   Psychiatric:         Mood and Affect: Mood normal.         Behavior: Behavior normal.             Does the patient have evidence of cognitive impairment? No  Lab Results   Component Value Date    CHLPL 130 2024    TRIG 80 2024    HDL 50 2024    LDL 64 2024    VLDL 16 2024                                                                                               Health  Risk Assessment    Smoking Status:  Social History     Tobacco Use   Smoking Status Never    Passive exposure: Never   Smokeless Tobacco Never     Alcohol Consumption:  Social History     Substance and Sexual Activity   Alcohol Use Never       Fall Risk Screen  STEADI Fall Risk Assessment was completed, and patient is at LOW risk for falls.Assessment completed on:2024    Depression Screenin/19/2024    10:03 AM   PHQ-2/PHQ-9 Depression Screening   Little Interest or Pleasure in Doing Things 0-->not at all   Feeling Down, Depressed or Hopeless 0-->not at all   PHQ-9: Brief Depression Severity Measure Score 0     Health Habits and Functional and Cognitive Screenin/19/2024    10:00 AM    Functional & Cognitive Status   Do you have difficulty preparing food and eating? No   Do you have difficulty bathing yourself, getting dressed or grooming yourself? No   Do you have difficulty using the toilet? No   Do you have difficulty moving around from place to place? No   Do you have trouble with steps or getting out of a bed or a chair? No   Current Diet Well Balanced Diet   Dental Exam Not up to date   Eye Exam Not up to date   Exercise (times per week) 0 times per week   Current Exercises Include No Regular Exercise   Do you need help using the phone?  No   Are you deaf or do you have serious difficulty hearing?  No   Do you need help to go to places out of walking distance? Yes   Do you need help shopping? No   Do you need help preparing meals?  No   Do you need help with housework?  No   Do you need help with laundry? No   Do you need help taking your medications? No   Do you need help managing money? No   Do you ever drive or ride in a car without wearing a seat belt? No   Have you felt unusual stress, anger or loneliness in the last month? No   Who do you live with? Spouse   If you need help, do you have trouble finding someone available to you? No   Have you been bothered in the last four weeks by sexual problems? No   Do you have difficulty concentrating, remembering or making decisions? No           Age-appropriate Screening Schedule:  Refer to the list below for future screening recommendations based on patient's age, sex and/or medical conditions. Orders for these recommended tests are listed in the plan section. The patient has been provided with a written plan.    Health Maintenance List  Health Maintenance   Topic Date Due    ZOSTER VACCINE (1 of 2) Never done    INFLUENZA VACCINE  08/01/2024    TDAP/TD VACCINES (1 - Tdap) 08/19/2025 (Originally 6/6/1959)    BMI FOLLOWUP  12/19/2024    LIPID PANEL  07/09/2025    ANNUAL WELLNESS VISIT  08/19/2025    COVID-19 Vaccine  Completed    RSV Vaccine -  Adults  Completed    Pneumococcal Vaccine 65+  Completed                                                                                                                                                CMS Preventative Services Quick Reference  Risk Factors Identified During Encounter  Immunizations Discussed/Encouraged: Influenza and Shingrix    The above risks/problems have been discussed with the patient.  Pertinent information has been shared with the patient in the After Visit Summary.  An After Visit Summary and PPPS were made available to the patient.    Follow Up:   Next Medicare Wellness visit to be scheduled in 1 year.     Assessment & Plan  Encounter for subsequent annual wellness visit (AWV) in Medicare patient  Updated annual wellness visit checklist.  Immunizations discussed.  Screenings discussed.   Recommend yearly dental and eye exams. Also discussed monitoring of blood pressure and lipids. We addressed patient self-assessment of health status, frailty, and physical functioning. We reviewed psychosocial risks, behavioral risks, instrumental activities of daily living, and patient health risk assessment. Patient was given a personalized prevention plan.     Chronic gout without tophus, unspecified cause, unspecified site  Refilled allopurinol- will check uric acid with labs.   Primary hypertension  Continue current medication and f/up with cardiology as directed.   Primary hypothyroidism  Refilled levothyroxine- has had TSH level already checked with recent labs.   Mixed hyperlipidemia   Continue current medication and work on healthy diet and exercise to help keep low.   Atrial fibrillation, chronic  Continue medication and f/up with cardiology as directed.   Chronic bilateral low back pain, unspecified whether sciatica present  Symptom management at this time- rtc if symptoms persist or progress.       Orders Placed This Encounter   Procedures    Uric Acid     New Medications Ordered This Visit    Medications    allopurinol (ZYLOPRIM) 100 MG tablet     Sig: Take 1 tablet by mouth 2 (Two) Times a Day.     Dispense:  180 tablet     Refill:  1     Please send a replace/new response with 90-Day Supply if appropriate to maximize member benefit. Requesting 1 year supply.    levothyroxine (SYNTHROID, LEVOTHROID) 88 MCG tablet     Sig: Take 1 tablet by mouth Daily.     Dispense:  90 tablet     Refill:  1     Please send a replace/new response with 90-Day Supply if appropriate to maximize member benefit. Requesting 1 year supply.          Follow Up:   No follow-ups on file.

## 2024-08-20 PROBLEM — Z00.00 ENCOUNTER FOR SUBSEQUENT ANNUAL WELLNESS VISIT (AWV) IN MEDICARE PATIENT: Status: ACTIVE | Noted: 2024-08-20

## 2024-08-20 LAB — URATE SERPL-MCNC: 3.6 MG/DL (ref 3.8–8.4)

## 2024-08-20 NOTE — ASSESSMENT & PLAN NOTE
Updated annual wellness visit checklist.  Immunizations discussed.  Screenings discussed.   Recommend yearly dental and eye exams. Also discussed monitoring of blood pressure and lipids. We addressed patient self-assessment of health status, frailty, and physical functioning. We reviewed psychosocial risks, behavioral risks, instrumental activities of daily living, and patient health risk assessment. Patient was given a personalized prevention plan.

## 2024-08-24 RX ORDER — PANTOPRAZOLE SODIUM 40 MG/1
40 TABLET, DELAYED RELEASE ORAL DAILY
Qty: 90 TABLET | Refills: 3 | OUTPATIENT
Start: 2024-08-24

## 2024-09-20 ENCOUNTER — OFFICE VISIT (OUTPATIENT)
Dept: FAMILY MEDICINE CLINIC | Facility: CLINIC | Age: 84
End: 2024-09-20
Payer: MEDICARE

## 2024-09-20 VITALS
WEIGHT: 201 LBS | SYSTOLIC BLOOD PRESSURE: 110 MMHG | HEART RATE: 60 BPM | DIASTOLIC BLOOD PRESSURE: 68 MMHG | OXYGEN SATURATION: 96 % | BODY MASS INDEX: 28.14 KG/M2 | RESPIRATION RATE: 22 BRPM | HEIGHT: 71 IN

## 2024-09-20 DIAGNOSIS — H10.32 ACUTE BACTERIAL CONJUNCTIVITIS OF LEFT EYE: Primary | ICD-10-CM

## 2024-09-20 PROCEDURE — 99213 OFFICE O/P EST LOW 20 MIN: CPT | Performed by: NURSE PRACTITIONER

## 2024-09-20 PROCEDURE — 3074F SYST BP LT 130 MM HG: CPT | Performed by: NURSE PRACTITIONER

## 2024-09-20 PROCEDURE — 1126F AMNT PAIN NOTED NONE PRSNT: CPT | Performed by: NURSE PRACTITIONER

## 2024-09-20 PROCEDURE — 3078F DIAST BP <80 MM HG: CPT | Performed by: NURSE PRACTITIONER

## 2024-09-20 RX ORDER — NEOMYCIN SULFATE, POLYMYXIN B SULFATE AND DEXAMETHASONE 3.5; 10000; 1 MG/ML; [USP'U]/ML; MG/ML
2 SUSPENSION/ DROPS OPHTHALMIC
Qty: 5 ML | Refills: 0 | Status: SHIPPED | OUTPATIENT
Start: 2024-09-20

## 2024-09-23 ENCOUNTER — TELEPHONE (OUTPATIENT)
Dept: CARDIOLOGY | Facility: CLINIC | Age: 84
End: 2024-09-23
Payer: MEDICARE

## 2024-10-01 ENCOUNTER — OFFICE VISIT (OUTPATIENT)
Dept: FAMILY MEDICINE CLINIC | Facility: CLINIC | Age: 84
End: 2024-10-01
Payer: MEDICARE

## 2024-10-01 VITALS
SYSTOLIC BLOOD PRESSURE: 120 MMHG | DIASTOLIC BLOOD PRESSURE: 70 MMHG | WEIGHT: 199 LBS | BODY MASS INDEX: 27.86 KG/M2 | HEIGHT: 71 IN | HEART RATE: 75 BPM | OXYGEN SATURATION: 97 %

## 2024-10-01 DIAGNOSIS — M25.531 RIGHT WRIST PAIN: Primary | ICD-10-CM

## 2024-10-01 DIAGNOSIS — K21.9 GASTROESOPHAGEAL REFLUX DISEASE, UNSPECIFIED WHETHER ESOPHAGITIS PRESENT: ICD-10-CM

## 2024-10-01 PROCEDURE — 3078F DIAST BP <80 MM HG: CPT | Performed by: PHYSICIAN ASSISTANT

## 2024-10-01 PROCEDURE — 1126F AMNT PAIN NOTED NONE PRSNT: CPT | Performed by: PHYSICIAN ASSISTANT

## 2024-10-01 PROCEDURE — 3074F SYST BP LT 130 MM HG: CPT | Performed by: PHYSICIAN ASSISTANT

## 2024-10-01 PROCEDURE — 99214 OFFICE O/P EST MOD 30 MIN: CPT | Performed by: PHYSICIAN ASSISTANT

## 2024-10-01 NOTE — PROGRESS NOTES
"Chief Complaint  Wrist Pain (Right wrist pain going on for 3 days )    Subjective          Reg Guerra presents to [facility]  History of Present Illness  Patient in today for evaluation on pain to his right wrist for the past 3 days.  He is unaware of any specific injury to area.  He does have history of gout, but states this does not feel like his normal flareups.  He has not had it in his wrist before. Taking tylenol which has helped.  His last uric acid level was low at 3.8.  He takes allopurinol daily.  He denies any fever.  While here, he states he also has been having more issues of belching and some reflux symptoms.  He states he discontinued the Protonix several months ago after evaluation with the general surgeon who told him he could discontinue medication if not needed.  He has been taking Tums and feels like he might need his medication back.  Denies any vomiting.  Denies any changes to stool.  He does follow with oncology for anemia.  Wrist Pain   The pain is present in the right wrist. This is a new problem. The current episode started in the past 7 days. Pertinent negatives include no fever.       Objective   Vital Signs:   /70   Pulse 75   Ht 180.3 cm (71\")   Wt 90.3 kg (199 lb)   SpO2 97%   BMI 27.75 kg/m²     Body mass index is 27.75 kg/m².    Review of Systems   Constitutional:  Negative for fever.   Respiratory:  Negative for shortness of breath.    Cardiovascular:  Negative for chest pain.   Gastrointestinal:  Positive for GERD. Negative for abdominal pain, diarrhea, nausea and vomiting.   Musculoskeletal:  Positive for arthralgias.       Past History:  Medical History: has a past medical history of Acquired hypothyroidism, Allergic rhinitis due to pollen, Appendicitis, Atherosclerotic heart disease of native coronary artery with other forms of angina pectoris, Benign prostate hyperplasia, Bilateral cataracts, BMI 28.0-28.9,adult, Chronic atrial " fibrillation, Chronic gout, unspecified, without tophus (tophi), Chronic gouty arthritis, Chronic low back pain, Diabetes, DJD (degenerative joint disease), Dyslipidemia, Essential hypertension, Gastroesophageal reflux disease without esophagitis, Hiatal hernia, History of rheumatic fever as a child, Hyperlipidemia, Knee injury, Mixed hyperlipidemia, Obesity, Peptic ulcer disease, and Presence of cardiac pacemaker.   Surgical History: has a past surgical history that includes Knee surgery (1960); Appendectomy (1957); Ventral hernia repair; Kidney stone surgery; Cardiac pacemaker placement; Nissen fundoplication; TURP / transurethral incision / drainage prostate; Mandible fracture surgery (2009); Bypass Graft; Colonoscopy; and Atrial Appendage Exclusion Left (N/A, 12/13/2023).   Family History: family history includes Diabetes in his sister; Heart attack in his sister; Hypertension in his sister; Pancreatic cancer in his father.   Social History: reports that he has never smoked. He has never been exposed to tobacco smoke. He has never used smokeless tobacco. He reports that he does not drink alcohol and does not use drugs.      Current Outpatient Medications:     allopurinol (ZYLOPRIM) 100 MG tablet, Take 1 tablet by mouth 2 (Two) Times a Day., Disp: 180 tablet, Rfl: 1    amLODIPine (NORVASC) 5 MG tablet, Take 1 tablet by mouth Daily., Disp: 90 tablet, Rfl: 3    aspirin 81 MG EC tablet, Take 1 tablet by mouth Daily., Disp: 90 tablet, Rfl: 3    chlorthalidone (HYGROTON) 25 MG tablet, Take 1 tablet by mouth Daily., Disp: 90 tablet, Rfl: 3    finasteride (PROSCAR) 5 MG tablet, Take 1 tablet by mouth Daily., Disp: , Rfl:     hydrALAZINE (APRESOLINE) 50 MG tablet, Take 1 tablet by mouth 2 (Two) Times a Day., Disp: 180 tablet, Rfl: 3    Inclisiran Sodium (LEQVIO SC), Inject  under the skin into the appropriate area as directed Every 3 (Three) Months., Disp: , Rfl:     levothyroxine (SYNTHROID, LEVOTHROID) 88 MCG tablet,  Take 1 tablet by mouth Daily., Disp: 90 tablet, Rfl: 1    neomycin-polymyxin-dexamethasone (MAXITROL) 3.5-01005-0.1 ophthalmic suspension, Administer 2 drops into the left eye Every 4 (Four) Hours., Disp: 5 mL, Rfl: 0    rosuvastatin (CRESTOR) 40 MG tablet, Take 1 tablet by mouth Daily., Disp: 90 tablet, Rfl: 3    tamsulosin (FLOMAX) 0.4 MG capsule 24 hr capsule, Take 1 capsule by mouth Daily. 1/2 HOUR FOLLOWING THE SAME MEAL EACH DAY, Disp: , Rfl:   Allergies: Patient has no known allergies.    Physical Exam  Constitutional:       Appearance: Normal appearance.   Cardiovascular:      Rate and Rhythm: Normal rate and regular rhythm.      Heart sounds: Normal heart sounds.   Pulmonary:      Effort: Pulmonary effort is normal.      Breath sounds: Normal breath sounds.   Abdominal:      Palpations: Abdomen is soft.      Tenderness: There is no abdominal tenderness.   Neurological:      Mental Status: He is alert and oriented to person, place, and time.   Psychiatric:         Mood and Affect: Mood normal.         Behavior: Behavior normal.             Assessment and Plan   Diagnoses and all orders for this visit:    1. Right wrist pain (Primary)  -     XR Wrist 3+ View Right (In Office)  -     Uric Acid  Will check xray of wrist today- will check uric acid level; if symptoms not improving would recommend to get back in with  his orthopedist or has been seeing pain mgt for injections to other joints   2. Gastroesophageal reflux disease, unspecified whether esophagitis present  Will get note from hematology- discussed may send back in the protonix if symptoms not staying well controlled- try to avoid aggravaging foods/beverages- rtc or to ER for any acute/worsening symptoms if needed          Follow Up   No follow-ups on file.  Patient was given instructions and counseling regarding his condition or for health maintenance advice. Please see specific information pulled into the AVS if appropriate.     Gloria Siegel,  CAROLINE

## 2024-10-02 ENCOUNTER — TELEPHONE (OUTPATIENT)
Dept: FAMILY MEDICINE CLINIC | Facility: CLINIC | Age: 84
End: 2024-10-02
Payer: MEDICARE

## 2024-10-02 DIAGNOSIS — M25.531 RIGHT WRIST PAIN: Primary | ICD-10-CM

## 2024-10-02 LAB — URATE SERPL-MCNC: 3.5 MG/DL (ref 3.8–8.4)

## 2024-10-02 NOTE — TELEPHONE ENCOUNTER
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

## 2024-10-02 NOTE — TELEPHONE ENCOUNTER
Name: Reg Guerra Springer      Relationship: Self      Best Callback Number: [phone]       HUB PROVIDED THE RELAY MESSAGE FROM THE OFFICE      PATIENT: VOICED UNDERSTANDING AND HAS NO FURTHER QUESTIONS AT THIS TIME    ADDITIONAL INFORMATION: PATIENT STATES THAT HE IS USUALLY SEEN AT [facility] BY ANY PROVIDER THAT IS AVAILABLE    PLEASE BE ADVISED

## 2024-10-24 ENCOUNTER — CLINICAL SUPPORT NO REQUIREMENTS (OUTPATIENT)
Dept: CARDIOLOGY | Facility: CLINIC | Age: 84
End: 2024-10-24
Payer: MEDICARE

## 2024-10-24 DIAGNOSIS — Z95.0 CARDIAC PACEMAKER IN SITU: Primary | ICD-10-CM

## 2024-10-24 PROCEDURE — 93279 PRGRMG DEV EVAL PM/LDLS PM: CPT | Performed by: INTERNAL MEDICINE

## 2024-11-20 ENCOUNTER — OFFICE VISIT (OUTPATIENT)
Dept: CARDIOLOGY | Facility: CLINIC | Age: 84
End: 2024-11-20
Payer: MEDICARE

## 2024-11-20 VITALS
HEART RATE: 70 BPM | HEIGHT: 71 IN | DIASTOLIC BLOOD PRESSURE: 80 MMHG | BODY MASS INDEX: 28 KG/M2 | OXYGEN SATURATION: 98 % | TEMPERATURE: 98 F | SYSTOLIC BLOOD PRESSURE: 136 MMHG | RESPIRATION RATE: 18 BRPM | WEIGHT: 200 LBS

## 2024-11-20 DIAGNOSIS — I25.10 CORONARY ARTERY DISEASE INVOLVING NATIVE CORONARY ARTERY OF NATIVE HEART WITHOUT ANGINA PECTORIS: ICD-10-CM

## 2024-11-20 DIAGNOSIS — E78.2 MIXED HYPERLIPIDEMIA: ICD-10-CM

## 2024-11-20 DIAGNOSIS — Z95.818 PRESENCE OF WATCHMAN LEFT ATRIAL APPENDAGE CLOSURE DEVICE: ICD-10-CM

## 2024-11-20 DIAGNOSIS — Z95.0 PRESENCE OF CARDIAC PACEMAKER: ICD-10-CM

## 2024-11-20 DIAGNOSIS — I10 PRIMARY HYPERTENSION: ICD-10-CM

## 2024-11-20 DIAGNOSIS — I48.20 ATRIAL FIBRILLATION, CHRONIC: Primary | ICD-10-CM

## 2024-11-20 DIAGNOSIS — I44.2 CHB (COMPLETE HEART BLOCK): ICD-10-CM

## 2024-11-20 NOTE — PROGRESS NOTES
[facility] [place]  [facility]  [address] [place] [address]  Dept: [phone]  Dept Fax: [phone]    Reg Guerra  1940    Follow Up Office Visit Note    History of Present Illness:  Reg Guerra is a 84 y.o. male who presents to the clinic for Follow-up. CAD- s.p CABG in 2005, and stress nuclear 2020 ischemic inferolateral, no chest pain, EF normal, will keep same meds    The following portions of the patient's history were reviewed and updated as appropriate: allergies, current medications, past family history, past medical history, past social history, past surgical history, and problem list.    Medications:  allopurinol  amLODIPine  aspirin  chlorthalidone  finasteride  hydrALAZINE  LEQVIO SC  levothyroxine  neomycin-polymyxin-dexamethasone  rosuvastatin  tamsulosin capsule    Subjective  No Known Allergies     Past Medical History:   Diagnosis Date   • Acquired hypothyroidism    • Allergic rhinitis due to pollen    • Appendicitis    • Atherosclerotic heart disease of native coronary artery with other forms of angina pectoris    • Benign prostate hyperplasia    • Bilateral cataracts    • BMI 28.0-28.9,adult    • Chronic atrial fibrillation    • Chronic gout, unspecified, without tophus (tophi)    • Chronic gouty arthritis    • Chronic low back pain    • Diabetes    • DJD (degenerative joint disease)    • Dyslipidemia    • Essential hypertension    • Gastroesophageal reflux disease without esophagitis    • Hiatal hernia    • History of rheumatic fever as a child    • Hyperlipidemia    • Knee injury    • Mixed hyperlipidemia    • Obesity    • Peptic ulcer disease    • Presence of cardiac pacemaker        Past Surgical History:   Procedure Laterality Date   • APPENDECTOMY  1957   • ATRIAL APPENDAGE EXCLUSION LEFT WITH TRANSESOPHAGEAL ECHOCARDIOGRAM N/A 12/13/2023    Procedure: Atrial Appendage Occlusion;  Surgeon: Vicente Rodriguez DO;  Location:   "[facility];  Service: Cardiology;  Laterality: N/A;   • BYPASS GRAFT     • CARDIAC PACEMAKER PLACEMENT      VVI   • COLONOSCOPY     • KIDNEY STONE SURGERY      LITHOTRIPSY   • KNEE SURGERY  1960   • MANDIBLE FRACTURE SURGERY  2009    KICKED BY HORSE   • NISSEN FUNDOPLICATION     • TURP / TRANSURETHRAL INCISION / DRAINAGE PROSTATE     • VENTRAL HERNIA REPAIR         Family History   Problem Relation Age of Onset   • Pancreatic cancer Father    • Heart attack Sister    • Diabetes Sister    • Hypertension Sister         Social History     Socioeconomic History   • Marital status:    • Number of children: 2   • Highest education level: 12th grade   Tobacco Use   • Smoking status: Never     Passive exposure: Never   • Smokeless tobacco: Never   Vaping Use   • Vaping status: Never Used   Substance and Sexual Activity   • Alcohol use: Never   • Drug use: Never   • Sexual activity: Defer       Review of Systems   Constitutional: Negative.    HENT: Negative.     Respiratory: Negative.     Cardiovascular: Negative.    Endocrine: Negative.    Genitourinary: Negative.    Musculoskeletal: Negative.    Skin: Negative.    Allergic/Immunologic: Negative.    Neurological: Negative.    Hematological: Negative.    Psychiatric/Behavioral: Negative.       Cardiovascular Procedures    ECHO/MUGA:  STRESS TESTS:   CARDIAC CATH:   DEVICES:   HOLTER:   CT/MRI:   VASCULAR:   CARDIOTHORACIC:     Objective  Vitals:    11/20/24 0914   BP: 136/80   BP Location: Right arm   Patient Position: Sitting   Cuff Size: Adult   Pulse: 70   Resp: 18   Temp: 98 °F (36.7 °C)   TempSrc: Infrared   SpO2: 98%   Weight: 90.7 kg (200 lb)   Height: 180.3 cm (71\")   PainSc: 0-No pain     Body mass index is 27.89 kg/m².     Physical Exam  Vitals reviewed.   Constitutional:       Appearance: Healthy appearance. Not in distress.   Eyes:      Pupils: Pupils are equal, round, and reactive to light.   HENT:    Mouth/Throat:      Pharynx: Oropharynx " is clear.   Neck:      Thyroid: Thyroid normal.      Vascular: No JVR. JVD normal.   Pulmonary:      Effort: Pulmonary effort is normal.      Breath sounds: Normal breath sounds. No wheezing. No rhonchi. No rales.   Chest:      Chest wall: Not tender to palpatation.   Cardiovascular:      PMI at left midclavicular line. Normal rate. Regular rhythm. Normal S1. Normal S2.       Murmurs: There is no murmur.      No gallop.  No click. No rub.   Pulses:     Intact distal pulses.      Carotid: 3+ bilaterally.     Radial: 3+ bilaterally.     Femoral: 3+ bilaterally.     Dorsalis pedis: 3+ bilaterally.     Posterior tibial: 3+ bilaterally.  Edema:     Peripheral edema absent.   Abdominal:      General: Bowel sounds are normal.      Palpations: Abdomen is soft.      Tenderness: There is no abdominal tenderness.   Musculoskeletal: Normal range of motion.         General: No tenderness.      Cervical back: Normal range of motion and neck supple. Skin:     General: Skin is warm and dry.   Neurological:      General: No focal deficit present.      Mental Status: Alert and oriented to person, place and time.        Diagnostic Data    ECG 12 Lead    Date/Time: 11/20/2024 9:50 AM  Performed by: Fredy Yee MD    Authorized by: Fredy Yee MD  Comparison: compared with previous ECG from 7/9/2024  Similar to previous ECG  Rhythm: sinus rhythm and paced  Rate: normal  BPM: 72  QRS axis: normal  Pacing: ventricular paced rhythm  Clinical impression: abnormal EKG        Assessment and Plan  Diagnoses and all orders for this visit:    Atrial fibrillation, chronic-rate control, s/p ablation on Asa plus Watcham device  -     Comprehensive Metabolic Panel  -     CBC & Differential  -     Lipid Panel  -     Lipoprotein A (LPA)  -     Apolipoprotein B    CHB (complete heart block)- s.p pacer    Coronary artery disease involving native coronary artery of native heart without angina pectoris- No chest pain. S/PLAN: CABG  2005 no chest pain,    Mixed hyperlipidemia- On crestor 40 mg and Leqvio for LPa    Presence of cardiac pacemaker- this is working well    Presence of Watchman left atrial appendage closure device  -     Comprehensive Metabolic Panel  -     CBC & Differential  -     Lipid Panel  -     Lipoprotein A (LPA)  -     Apolipoprotein B    Primary hypertension- BP is 120.80 on Amlodipine 5mg, Hydralazine 50 mg bid and Chlorthalidone 25 mg, we want to use ARB instead Hydralazine but he refuses a change  -     Comprehensive Metabolic Panel  -     CBC & Differential  -     Lipid Panel  -     Lipoprotein A (LPA)  -     Apolipoprotein B         Return in about 6 months (around 5/20/2025) for Recheck with Dr. Yee.    Fredy Yee MD  11/20/2024

## 2024-11-21 LAB
ALBUMIN SERPL-MCNC: 4.6 G/DL (ref 3.7–4.7)
ALP SERPL-CCNC: 112 IU/L (ref 44–121)
ALT SERPL-CCNC: 15 IU/L (ref 0–44)
AST SERPL-CCNC: 26 IU/L (ref 0–40)
BASOPHILS # BLD AUTO: 0.1 X10E3/UL (ref 0–0.2)
BASOPHILS NFR BLD AUTO: 1 %
BILIRUB SERPL-MCNC: 0.5 MG/DL (ref 0–1.2)
BUN SERPL-MCNC: 17 MG/DL (ref 8–27)
BUN/CREAT SERPL: 15 (ref 10–24)
CALCIUM SERPL-MCNC: 9.8 MG/DL (ref 8.6–10.2)
CHLORIDE SERPL-SCNC: 101 MMOL/L (ref 96–106)
CHOLEST SERPL-MCNC: 89 MG/DL (ref 100–199)
CO2 SERPL-SCNC: 24 MMOL/L (ref 20–29)
CREAT SERPL-MCNC: 1.13 MG/DL (ref 0.76–1.27)
EGFRCR SERPLBLD CKD-EPI 2021: 64 ML/MIN/1.73
EOSINOPHIL # BLD AUTO: 0.3 X10E3/UL (ref 0–0.4)
EOSINOPHIL NFR BLD AUTO: 3 %
ERYTHROCYTE [DISTWIDTH] IN BLOOD BY AUTOMATED COUNT: 13.4 % (ref 11.6–15.4)
GLOBULIN SER CALC-MCNC: 2.7 G/DL (ref 1.5–4.5)
GLUCOSE SERPL-MCNC: 112 MG/DL (ref 70–99)
HCT VFR BLD AUTO: 42.5 % (ref 37.5–51)
HDLC SERPL-MCNC: 54 MG/DL
HGB BLD-MCNC: 14.1 G/DL (ref 13–17.7)
IMM GRANULOCYTES # BLD AUTO: 0.1 X10E3/UL (ref 0–0.1)
IMM GRANULOCYTES NFR BLD AUTO: 1 %
LDLC SERPL CALC-MCNC: 16 MG/DL (ref 0–99)
LPA SERPL-SCNC: 149.6 NMOL/L
LYMPHOCYTES # BLD AUTO: 1.7 X10E3/UL (ref 0.7–3.1)
LYMPHOCYTES NFR BLD AUTO: 17 %
MCH RBC QN AUTO: 31.8 PG (ref 26.6–33)
MCHC RBC AUTO-ENTMCNC: 33.2 G/DL (ref 31.5–35.7)
MCV RBC AUTO: 96 FL (ref 79–97)
MONOCYTES # BLD AUTO: 0.9 X10E3/UL (ref 0.1–0.9)
MONOCYTES NFR BLD AUTO: 9 %
NEUTROPHILS # BLD AUTO: 7.1 X10E3/UL (ref 1.4–7)
NEUTROPHILS NFR BLD AUTO: 69 %
PLATELET # BLD AUTO: 255 X10E3/UL (ref 150–450)
POTASSIUM SERPL-SCNC: 3.6 MMOL/L (ref 3.5–5.2)
PROT SERPL-MCNC: 7.3 G/DL (ref 6–8.5)
RBC # BLD AUTO: 4.44 X10E6/UL (ref 4.14–5.8)
SODIUM SERPL-SCNC: 140 MMOL/L (ref 134–144)
TRIGL SERPL-MCNC: 101 MG/DL (ref 0–149)
VLDLC SERPL CALC-MCNC: 19 MG/DL (ref 5–40)
WBC # BLD AUTO: 10.1 X10E3/UL (ref 3.4–10.8)

## 2024-11-22 ENCOUNTER — TELEPHONE (OUTPATIENT)
Dept: CARDIOLOGY | Facility: CLINIC | Age: 84
End: 2024-11-22
Payer: MEDICARE

## 2024-11-22 LAB — APO B SERPL-MCNC: 27 MG/DL

## 2024-11-22 RX ORDER — ROSUVASTATIN CALCIUM 10 MG/1
10 TABLET, COATED ORAL DAILY
Qty: 90 TABLET | Refills: 3 | Status: SHIPPED | OUTPATIENT
Start: 2024-11-22

## 2024-11-22 NOTE — TELEPHONE ENCOUNTER
----- Message from Marisela WRIGHT sent at 11/22/2024  8:30 AM EST -----    ----- Message -----  From: Fredy Yee MD  Sent: 11/22/2024   8:29 AM EST  To: Marisela Loving RN    With leqvio LDL is 16 and Lpa is lower, we can lower the Crestor to 10 mg

## 2024-12-17 ENCOUNTER — HOSPITAL ENCOUNTER (OUTPATIENT)
Dept: CARDIOLOGY | Facility: HOSPITAL | Age: 84
Discharge: HOME | End: 2024-12-17
Admitting: INTERNAL MEDICINE
Payer: MEDICARE

## 2024-12-17 VITALS
HEART RATE: 70 BPM | SYSTOLIC BLOOD PRESSURE: 112 MMHG | OXYGEN SATURATION: 94 % | RESPIRATION RATE: 18 BRPM | DIASTOLIC BLOOD PRESSURE: 68 MMHG

## 2024-12-17 DIAGNOSIS — I48.19 PERSISTENT ATRIAL FIBRILLATION: ICD-10-CM

## 2024-12-17 DIAGNOSIS — Z95.818 PRESENCE OF WATCHMAN LEFT ATRIAL APPENDAGE CLOSURE DEVICE: ICD-10-CM

## 2024-12-17 PROCEDURE — 93321 DOPPLER ECHO F-UP/LMTD STD: CPT

## 2024-12-17 PROCEDURE — 76376 3D RENDER W/INTRP POSTPROCES: CPT

## 2024-12-17 PROCEDURE — 93325 DOPPLER ECHO COLOR FLOW MAPG: CPT

## 2024-12-17 PROCEDURE — 25010000002 MIDAZOLAM PER 1 MG: Performed by: INTERNAL MEDICINE

## 2024-12-17 RX ORDER — ETOMIDATE 2 MG/ML
INJECTION INTRAVENOUS
Status: COMPLETED | OUTPATIENT
Start: 2024-12-17 | End: 2024-12-17

## 2024-12-17 RX ORDER — MIDAZOLAM HYDROCHLORIDE 1 MG/ML
INJECTION, SOLUTION INTRAMUSCULAR; INTRAVENOUS
Status: COMPLETED | OUTPATIENT
Start: 2024-12-17 | End: 2024-12-17

## 2024-12-17 RX ADMIN — ETOMIDATE 5 MG: 20 INJECTION, SOLUTION INTRAVENOUS at 14:12

## 2024-12-17 RX ADMIN — MIDAZOLAM 2 MG: 1 INJECTION INTRAMUSCULAR; INTRAVENOUS at 14:12

## 2024-12-17 NOTE — H&P
[facility]   [address], [address]  [place], [place], [address]    [phone]  [email or web address]           HISTORY AND PHYSICAL NOTE    Patient Care Team:  Patient Care Team:  Gloria Siegel PA-C as PCP - General (Physician Assistant)  Fredy Yee MD as Consulting Physician (Cardiology)  Vicente Rodriguez DO as Consulting Physician (Cardiology)       Chief complaint: Atrial fibrillation.          Subjective:     Cardiac focused problem list:  Chronic atrial fibrillation  CHADsVasc 5, unable to tolerate anticoagulation due to recurrent epistaxis, HASBLED 4  Remote AV node ablation/VVI pacemaker  Status post Watchman device implant 12/13/2023  Coronary artery disease  Status post CABG in 2005  Lexiscan stress test 2/23/2016: EF 59%, moderate size mild in severity lateral reversible ischemia  Echocardiogram 8/30/2017: LVEF 55-60%, concentric LVH, aortic sclerosis, mild MR  Hypertension  Hyperlipidemia  Hypothyroidism, on replacement therapy  Gout  GERD  BPH  DJD  History of rheumatic fever as a child  Surgical history:  Appendectomy  Watchman device implant December 2023  Pacemaker implant  CABG  Lithotripsy  Knee surgery  Mandible fracture  Nissen fundoplication  TURP  Ventral hernia repair    HPI:      Reg Guerra is a 84 y.o. male.  Patient with history of chronic atrial fibrillation.  Status post Watchman device implant 12/2023.  Currently on aspirin monotherapy.  Presents today for one year transesophageal echocardiogram status post Watchman implant 12/2023.     Review of Systems:  As noted in the HPI    PFSH:  Patient Active Problem List   Diagnosis    Coronary artery disease involving native coronary artery of native heart    Atrial fibrillation, chronic    Primary hypertension    Mixed hyperlipidemia    Presence of cardiac pacemaker    Gastroesophageal reflux disease    Chronic pain of right knee    Primary hypothyroidism    Chronic gout without  cliff    Presence of Watchman left atrial appendage closure device    CHB (complete heart block)    Pain in pacemaker pocket    Encounter for subsequent annual wellness visit (AWV) in Medicare patient       Current Outpatient Medications on File Prior to Encounter   Medication Sig Dispense Refill    allopurinol (ZYLOPRIM) 100 MG tablet Take 1 tablet by mouth 2 (Two) Times a Day. 180 tablet 1    amLODIPine (NORVASC) 5 MG tablet Take 1 tablet by mouth Daily. 90 tablet 3    aspirin 81 MG EC tablet Take 1 tablet by mouth Daily. 90 tablet 3    chlorthalidone (HYGROTON) 25 MG tablet Take 1 tablet by mouth Daily. 90 tablet 3    finasteride (PROSCAR) 5 MG tablet Take 1 tablet by mouth Daily.      hydrALAZINE (APRESOLINE) 50 MG tablet Take 1 tablet by mouth 2 (Two) Times a Day. 180 tablet 3    Inclisiran Sodium (LEQVIO SC) Inject  under the skin into the appropriate area as directed Every 3 (Three) Months.      levothyroxine (SYNTHROID, LEVOTHROID) 88 MCG tablet Take 1 tablet by mouth Daily. 90 tablet 1    neomycin-polymyxin-dexamethasone (MAXITROL) 3.5-58919-5.1 ophthalmic suspension Administer 2 drops into the left eye Every 4 (Four) Hours. (Patient not taking: Reported on 11/20/2024) 5 mL 0    rosuvastatin (CRESTOR) 10 MG tablet Take 1 tablet by mouth Daily. 90 tablet 3    tamsulosin (FLOMAX) 0.4 MG capsule 24 hr capsule Take 1 capsule by mouth Daily. 1/2 HOUR FOLLOWING THE SAME MEAL EACH DAY       No current facility-administered medications on file prior to encounter.       Social History     Socioeconomic History    Marital status:     Number of children: 2    Highest education level: 12th grade   Tobacco Use    Smoking status: Never     Passive exposure: Never    Smokeless tobacco: Never   Vaping Use    Vaping status: Never Used   Substance and Sexual Activity    Alcohol use: Never    Drug use: Never    Sexual activity: Defer            Objective:     Vital Sign Min/Max for last 24 hours  No data recorded   No  data recorded   No data recorded   No data recorded   No data recorded   No data recorded    No intake or output data in the 24 hours ending 12/17/24 1312        There were no vitals filed for this visit.  CONSTITUTIONAL: No acute distress  RESPIRATORY: Normal effort. Clear to auscultation bilaterally without wheezing or rales  CARDIOVASCULAR: Regular rate and rhythm with normal S1 and S2. Without murmur.  PERIPHERAL VASCULAR: No carotid bruit bilaterally.  Normal radial pulse. There is no lower extremity edema bilaterally.    Labs and radiologic results:  Today's results were reviewed by myself.    Cardiac Data:    Results for orders placed during the hospital encounter of 01/29/24    Adult Transesophageal Echo 3D (LA) W/ Cont If Necessary Per Protocol    Interpretation Summary    Technically difficult study secondary to rotation of the heart and unable to obtain transgastric views..  Limited images obtained    24 mm Watchman FLX left atrial appendage occlusion device in place.  The device is well-seated with no periprosthetic flow    Left ventricular ejection fraction appears to be 51 - 55%.    The left atrial cavity is severely dilated.    Mild mitral valve regurgitation is present.    Mild aortic valve regurgitation is present.           Assessment and Plan:     Problem list:    * No active hospital problems. *      ASSESSMENT:  PAF  CHADsVASc score of 5   Not a good candidate for long term anticoagulation due to recurrent epistaxis.   Remote AV node ablation/VVI pacemaker  Status post Watchman device implant 12/13/2023    PLAN:  Transesophageal echocardiogram today with Dr. De Los Santos.    The risks, benefits, and alternatives of the procedure have been reviewed and the patient wishes to proceed.   Further recommendations to follow procedure.       Electronically signed by TEODORA Maher, 12/17/24, 1:35 PM EST.

## 2024-12-31 ENCOUNTER — DOCUMENTATION (OUTPATIENT)
Dept: CARDIOLOGY | Facility: CLINIC | Age: 84
End: 2024-12-31
Payer: MEDICARE

## 2024-12-31 NOTE — PROGRESS NOTES
Katy from [facility] called and ask us to enroll Mr Guerra in our Home monitoring. He has transferred care to Dr Rodriguez. I enrolled him in our [facility] home monitoring.

## 2025-02-05 ENCOUNTER — OFFICE VISIT (OUTPATIENT)
Dept: FAMILY MEDICINE CLINIC | Facility: CLINIC | Age: 85
End: 2025-02-05
Payer: MEDICARE

## 2025-02-05 VITALS
BODY MASS INDEX: 27.76 KG/M2 | WEIGHT: 198.3 LBS | SYSTOLIC BLOOD PRESSURE: 124 MMHG | OXYGEN SATURATION: 97 % | DIASTOLIC BLOOD PRESSURE: 82 MMHG | HEART RATE: 73 BPM | HEIGHT: 71 IN

## 2025-02-05 DIAGNOSIS — M25.531 WRIST PAIN, RIGHT: Primary | ICD-10-CM

## 2025-02-05 PROCEDURE — 1160F RVW MEDS BY RX/DR IN RCRD: CPT | Performed by: NURSE PRACTITIONER

## 2025-02-05 PROCEDURE — 3074F SYST BP LT 130 MM HG: CPT | Performed by: NURSE PRACTITIONER

## 2025-02-05 PROCEDURE — 1126F AMNT PAIN NOTED NONE PRSNT: CPT | Performed by: NURSE PRACTITIONER

## 2025-02-05 PROCEDURE — 99214 OFFICE O/P EST MOD 30 MIN: CPT | Performed by: NURSE PRACTITIONER

## 2025-02-05 PROCEDURE — 3079F DIAST BP 80-89 MM HG: CPT | Performed by: NURSE PRACTITIONER

## 2025-02-05 PROCEDURE — 1159F MED LIST DOCD IN RCRD: CPT | Performed by: NURSE PRACTITIONER

## 2025-02-05 RX ORDER — PREDNISONE 20 MG/1
40 TABLET ORAL DAILY
Qty: 8 TABLET | Refills: 0 | Status: SHIPPED | OUTPATIENT
Start: 2025-02-05

## 2025-02-05 NOTE — PROGRESS NOTES
"Chief Complaint  Wrist Pain (Pt complains of right wrist pain onset around 1 year and just getting worse. )    Subjective          Reg Guerra presents to [facility]  History of Present Illness  Pt has had right wrist pain x 1 year. He denies injury. He had an XR in October at our office. He has pain and stiffness in his various joints.  Wrist Pain   Pertinent negatives include no fever.       History of Present Illness      Objective   Vital Signs:   /82   Pulse 73   Ht 180.3 cm (71\")   Wt 89.9 kg (198 lb 4.8 oz)   SpO2 97%   BMI 27.66 kg/m²     Body mass index is 27.66 kg/m².    Review of Systems   Constitutional:  Negative for fatigue and fever.   Respiratory:  Negative for shortness of breath.    Cardiovascular:  Negative for chest pain, palpitations and leg swelling.   Musculoskeletal:  Positive for arthralgias.   Neurological:  Negative for syncope.   Psychiatric/Behavioral:  The patient is not nervous/anxious.           Current Outpatient Medications:     allopurinol (ZYLOPRIM) 100 MG tablet, Take 1 tablet by mouth 2 (Two) Times a Day., Disp: 180 tablet, Rfl: 1    amLODIPine (NORVASC) 5 MG tablet, Take 1 tablet by mouth Daily., Disp: 90 tablet, Rfl: 3    aspirin 81 MG EC tablet, Take 1 tablet by mouth Daily., Disp: 90 tablet, Rfl: 3    chlorthalidone (HYGROTON) 25 MG tablet, Take 1 tablet by mouth Daily., Disp: 90 tablet, Rfl: 3    finasteride (PROSCAR) 5 MG tablet, Take 1 tablet by mouth Daily., Disp: , Rfl:     hydrALAZINE (APRESOLINE) 50 MG tablet, Take 1 tablet by mouth 2 (Two) Times a Day., Disp: 180 tablet, Rfl: 3    Inclisiran Sodium (LEQVIO SC), Inject  under the skin into the appropriate area as directed Every 3 (Three) Months., Disp: , Rfl:     levothyroxine (SYNTHROID, LEVOTHROID) 88 MCG tablet, Take 1 tablet by mouth Daily., Disp: 90 tablet, Rfl: 1    neomycin-polymyxin-dexamethasone (MAXITROL) 3.5-76330-0.1 ophthalmic suspension, Administer 2 " drops into the left eye Every 4 (Four) Hours., Disp: 5 mL, Rfl: 0    rosuvastatin (CRESTOR) 10 MG tablet, Take 1 tablet by mouth Daily., Disp: 90 tablet, Rfl: 3    tamsulosin (FLOMAX) 0.4 MG capsule 24 hr capsule, Take 1 capsule by mouth Daily. 1/2 HOUR FOLLOWING THE SAME MEAL EACH DAY, Disp: , Rfl:     predniSONE (DELTASONE) 20 MG tablet, Take 2 tablets by mouth Daily., Disp: 8 tablet, Rfl: 0      Allergies: Patient has no known allergies.    Physical Exam  Constitutional:       Appearance: Normal appearance.   HENT:      Head: Normocephalic.   Eyes:      Conjunctiva/sclera: Conjunctivae normal.      Pupils: Pupils are equal, round, and reactive to light.   Cardiovascular:      Rate and Rhythm: Normal rate and regular rhythm.      Heart sounds: Normal heart sounds.   Pulmonary:      Effort: Pulmonary effort is normal.      Breath sounds: Normal breath sounds.   Abdominal:      Tenderness: There is no abdominal tenderness.   Musculoskeletal:         General: Normal range of motion.      Comments: Right wrist tenderness   Skin:     General: Skin is warm and dry.      Capillary Refill: Capillary refill takes less than 2 seconds.   Neurological:      General: No focal deficit present.      Mental Status: He is alert and oriented to person, place, and time.   Psychiatric:         Mood and Affect: Mood normal.         Behavior: Behavior normal.         Thought Content: Thought content normal.         Judgment: Judgment normal.          Physical Exam         Result Review :                Results            Assessment and Plan    Diagnoses and all orders for this visit:    1. Wrist pain, right (Primary)  Comments:  Apply ice to painful areas and wear splint. Prednisone for a few days and F/U with ortho. I reviewed imaging from October.  Orders:  -     predniSONE (DELTASONE) 20 MG tablet; Take 2 tablets by mouth Daily.  Dispense: 8 tablet; Refill: 0  -     Ambulatory Referral to Orthopedic Surgery                  Follow  Up   Return in about 1 week (around 2/12/2025) for if not improving or sooner if symptoms worsen.  Patient was given instructions and counseling regarding his condition or for health maintenance advice. Please see specific information pulled into the AVS if appropriate.     Dahlia Rooney, APRN

## 2025-02-10 ENCOUNTER — OFFICE VISIT (OUTPATIENT)
Age: 85
End: 2025-02-10
Payer: MEDICARE

## 2025-02-10 VITALS
DIASTOLIC BLOOD PRESSURE: 82 MMHG | BODY MASS INDEX: 31.31 KG/M2 | HEIGHT: 67 IN | WEIGHT: 199.5 LBS | SYSTOLIC BLOOD PRESSURE: 130 MMHG

## 2025-02-10 DIAGNOSIS — M19.039 WRIST ARTHRITIS: Primary | ICD-10-CM

## 2025-02-10 RX ORDER — OMEPRAZOLE 40 MG/1
CAPSULE, DELAYED RELEASE ORAL
COMMUNITY

## 2025-02-10 RX ORDER — TRIAMCINOLONE ACETONIDE 40 MG/ML
20 INJECTION, SUSPENSION INTRA-ARTICULAR; INTRAMUSCULAR
Status: COMPLETED | OUTPATIENT
Start: 2025-02-10 | End: 2025-02-10

## 2025-02-10 RX ORDER — LIDOCAINE HYDROCHLORIDE 10 MG/ML
0.5 INJECTION, SOLUTION EPIDURAL; INFILTRATION; INTRACAUDAL; PERINEURAL
Status: COMPLETED | OUTPATIENT
Start: 2025-02-10 | End: 2025-02-10

## 2025-02-10 RX ADMIN — TRIAMCINOLONE ACETONIDE 20 MG: 40 INJECTION, SUSPENSION INTRA-ARTICULAR; INTRAMUSCULAR at 11:08

## 2025-02-10 RX ADMIN — LIDOCAINE HYDROCHLORIDE 0.5 ML: 10 INJECTION, SOLUTION EPIDURAL; INFILTRATION; INTRACAUDAL; PERINEURAL at 11:08

## 2025-02-10 NOTE — PROGRESS NOTES
[facility] Orthopedic     Office Visit       Date: 02/10/2025   Patient Name: Reg Guerra  MRN: [id number]  YOB: 1940    Referring Physician: Dahlia Rooney APRN     Chief Complaint:   Chief Complaint   Patient presents with    Right Wrist - Pain       History of Present Illness:   Reg Guerra is a 84 y.o. male right-hand-dominant presents the right wrist pain of several years duration that acutely worsened over the last several weeks.  He reports right wrist pain with use or activity.  Reports pain is a 5 out of 10.  Reports he recently became increasingly swollen and painful.  Was placed on oral steroids with some improvement in his symptoms.  He denies wearing a brace.  He has a history of gout and atrial fibrillation.  No other concerns.      Subjective   Review of Systems:   Review of Systems   Constitutional:  Negative for chills, fever, unexpected weight gain and unexpected weight loss.   HENT:  Negative for congestion, postnasal drip and rhinorrhea.    Eyes:  Negative for blurred vision.   Respiratory:  Negative for shortness of breath.    Cardiovascular:  Negative for leg swelling.   Gastrointestinal:  Negative for abdominal pain, nausea and vomiting.   Genitourinary:  Negative for difficulty urinating.   Musculoskeletal:  Positive for arthralgias. Negative for gait problem, joint swelling and myalgias.   Skin:  Negative for skin lesions and wound.   Neurological:  Negative for dizziness, weakness, light-headedness and numbness.   Hematological:  Does not bruise/bleed easily.   Psychiatric/Behavioral:  Negative for depressed mood.         Pertinent review of systems per HPI.     I reviewed the patient's chief complaint, history of present illness, review of systems, past medical history, surgical history, family history, social history, medications and allergy list in the EMR on 02/10/2025 and agree with  "the findings above.    Objective    Vital Signs:   Vitals:    02/10/25 1039   BP: 130/82   Weight: 90.5 kg (199 lb 8 oz)   Height: 168.9 cm (66.5\")     BMI: Body mass index is 31.72 kg/m².    General Appearance: No acute distress. Alert and oriented.     Chest:  Non-labored breathing on room air. Regular rate and rhythm.    Upper Extremity Exam:    No erythema edema of the right wrist.  Tender palpation throughout the right radiocarpal joint.  Pain with flexion extension although no crepitus.    Fingers are warm, well-perfused with appropriate capillary refill.  Palpable radial pulse.    Sensation intact to light touch in median, radial and ulnar nerve distributions.    Motor- Fires FPL, ulnar intrinsics, EPL/EDC w/ full active and passive range of motion. Strength intact.    Non-tender except for in the areas highlighted    Imaging/Studies:   Imaging Results (Last 24 Hours)       ** No results found for the last 24 hours. **            X-ray of the right wrist from 10/1/2024 is apparently reviewed and interpreted myself and demonstrates evidence of right radiocarpal, DRUJ and thumb CMC arthritis    Procedures:  Procedures    Quality Measures:   ACP:   ACP discussion was deferred.    Tobacco:   Reg Guerra  reports that he has never smoked. He has never been exposed to tobacco smoke. He has never used smokeless tobacco.      Assessment / Plan    Assessment/Plan:     There are no diagnoses linked to this encounter.     Reg Guerrais a 84 y.o. male who presents with:      ICD-10-CM ICD-9-CM   1. Wrist arthritis  M19.039 716.93         Patient presents with right wrist arthritis that was recently exacerbated by a likely inflammatory flare.  Discussed wrist arthritis with patient as well as the options for treatment including anti-inflammatories, bracing and corticosteroid injection.  Patient like to proceed with a right wrist corticosteroid injection today.  Recommend patient follow-up as needed if " symptoms persist or worsen.    Procedure Note:    I discussed with the patient the potential benefits of performing therapeutic aspiration and injections as well as potential risks including but not limited to infection, swelling, pain, bleeding, bruising, nerve/vessel damage, skin color changes, transient elevation in blood glucose levels, and fat atrophy. After informed consent and after the areas were prepped with chlorhexadine soap, ethyl chloride was used to numb the skin. Via the dorsal approach, 0.5 mL of 1% lidocaine was injected followed by injection of 20mg of Kenalog into the right radiocarpal joint.  The patient tolerated the procedure well. There were no complications. A sterile dressing was placed over the injection sites.      Follow Up:   Return if symptoms worsen or fail to improve.        Kurtis Miles MD  [facility] Hand and Upper Extremity Surgeon

## 2025-02-10 NOTE — PROGRESS NOTES
Procedure   - Medium Joint Arthrocentesis: R radiocarpal on 2/10/2025 11:08 AM  Indications: pain  Details: 25 G needle, dorsal approach  Medications: 20 mg triamcinolone acetonide 40 MG/ML; 0.5 mL lidocaine PF 1% 1 %  Outcome: tolerated well, no immediate complications  Procedure, treatment alternatives, risks and benefits explained, specific risks discussed. Consent was given by the patient. Immediately prior to procedure a time out was called to verify the correct patient, procedure, equipment, support staff and site/side marked as required. Patient was prepped and draped in the usual sterile fashion.

## 2025-02-12 RX ORDER — ALLOPURINOL 100 MG/1
100 TABLET ORAL 2 TIMES DAILY
Qty: 60 TABLET | Refills: 0 | Status: SHIPPED | OUTPATIENT
Start: 2025-02-12

## 2025-02-12 RX ORDER — LEVOTHYROXINE SODIUM 88 UG/1
88 TABLET ORAL DAILY
Qty: 30 TABLET | Refills: 0 | Status: SHIPPED | OUTPATIENT
Start: 2025-02-12

## 2025-03-06 RX ORDER — LEVOTHYROXINE SODIUM 88 UG/1
88 TABLET ORAL DAILY
Qty: 30 TABLET | Refills: 0 | Status: SHIPPED | OUTPATIENT
Start: 2025-03-06

## 2025-03-06 RX ORDER — ALLOPURINOL 100 MG/1
100 TABLET ORAL 2 TIMES DAILY
Qty: 60 TABLET | Refills: 0 | Status: SHIPPED | OUTPATIENT
Start: 2025-03-06

## 2025-03-17 ENCOUNTER — OFFICE VISIT (OUTPATIENT)
Dept: CARDIOLOGY | Facility: CLINIC | Age: 85
End: 2025-03-17
Payer: MEDICARE

## 2025-03-17 VITALS
OXYGEN SATURATION: 98 % | HEART RATE: 71 BPM | DIASTOLIC BLOOD PRESSURE: 70 MMHG | WEIGHT: 200.4 LBS | BODY MASS INDEX: 28.06 KG/M2 | HEIGHT: 71 IN | SYSTOLIC BLOOD PRESSURE: 136 MMHG

## 2025-03-17 DIAGNOSIS — I10 PRIMARY HYPERTENSION: ICD-10-CM

## 2025-03-17 DIAGNOSIS — Z95.818 PRESENCE OF WATCHMAN LEFT ATRIAL APPENDAGE CLOSURE DEVICE: ICD-10-CM

## 2025-03-17 DIAGNOSIS — I48.0 AF (PAROXYSMAL ATRIAL FIBRILLATION): Primary | ICD-10-CM

## 2025-03-17 NOTE — PROGRESS NOTES
Cardiac Electrophysiology Outpatient Follow Up Note            [facility]    Follow Up Office Visit      Reg Guerra  [id number]  03/17/2025  [unfilled]  [unfilled]    Primary Care Physician: Gloria Siegel PA-C    Referred By: No ref. provider found    Subjective     Chief Complaint   Patient presents with    Atrial fibrillation, chronic     PROBLEM LIST:        Patient Active Problem List     Diagnosis Date Noted    CHB (complete heart block) 03/11/2024       Priority: High    Atrial fibrillation, chronic 04/27/2021       Priority: High       Note Last Updated: 3/5/2024       [id number]   remote AV node ablation with pacemaker implantation  Left atrial appendage closure with 24 mm Watchman FLX device 12/13/2023       Presence of Watchman left atrial appendage closure device 03/05/2024       Priority: Medium    Coronary artery disease involving native coronary artery of native heart 04/27/2021       Priority: Medium    Presence of cardiac pacemaker 04/27/2021       Priority: Medium       Note Last Updated: 11/27/2023       [employer] single-chamber permanent pacemaker generator change, 3/25/2020       Primary hypertension 04/27/2021       Priority: Low    Gastroesophageal reflux disease 07/05/2022    Chronic pain of right knee 07/05/2022    Primary hypothyroidism 07/05/2022    Chronic gout without tophus 07/05/2022    Mixed hyperlipidemia 04/27/2021       History of Present Illness:   Reg Guerra is a 84 y.o. male who presents to my electrophysiology clinic for follow up of atrial fibrillation.  He underwent Watchman implantation 12/1/2023.  12/17/2024 he underwent transesophageal echocardiogram which noted an LVEF of 46 to 50% with a well-seated Watchman left atrial appendage occluding device that was well-seated with mild periprosthetic leak.  He is  maintained on ASA 81mg PO every other daily.     Since his last visit he  denies chest pain, chest pressure, chest heaviness, shortness of breath, palpitations, edema, syncope, presyncope.  He did see his primary cardiologist in June with complaints of pacemaker pain.  Labs where obtained.  He states this has since resolved.       Past Medical History:   Diagnosis Date    Acquired hypothyroidism     Allergic rhinitis due to pollen     Appendicitis     Atherosclerotic heart disease of native coronary artery with other forms of angina pectoris     Benign prostate hyperplasia     Bilateral cataracts     BMI 28.0-28.9,adult     Chronic atrial fibrillation     Chronic gout, unspecified, without tophus (tophi)     Chronic gouty arthritis     Chronic low back pain     Diabetes     DJD (degenerative joint disease)     Dyslipidemia     Essential hypertension     Gastroesophageal reflux disease without esophagitis     Hiatal hernia     History of rheumatic fever as a child     Hyperlipidemia     Knee injury     Mixed hyperlipidemia     Obesity     Peptic ulcer disease     Presence of cardiac pacemaker        Past Surgical History:   Procedure Laterality Date    APPENDECTOMY  1957    ATRIAL APPENDAGE EXCLUSION LEFT WITH TRANSESOPHAGEAL ECHOCARDIOGRAM N/A 12/13/2023    Procedure: Atrial Appendage Occlusion;  Surgeon: Vicente Rodriguez DO;  Location: [facility] INVASIVE LOCATION;  Service: Cardiology;  Laterality: N/A;    BYPASS GRAFT      CARDIAC PACEMAKER PLACEMENT      VVI    COLONOSCOPY      KIDNEY STONE SURGERY      LITHOTRIPSY    KNEE SURGERY  1960    MANDIBLE FRACTURE SURGERY  2009    KICKED BY HORSE    NISSEN FUNDOPLICATION      TURP / TRANSURETHRAL INCISION / DRAINAGE PROSTATE      VENTRAL HERNIA REPAIR         Family History   Problem Relation Age of Onset    Pancreatic cancer Father     Heart attack Sister     Diabetes Sister     Hypertension Sister        Social History     Socioeconomic History    Marital status:     Number of children: 2    Highest education level: 12th grade  "  Tobacco Use    Smoking status: Never     Passive exposure: Never    Smokeless tobacco: Never   Vaping Use    Vaping status: Never Used   Substance and Sexual Activity    Alcohol use: Never    Drug use: Never    Sexual activity: Defer         Current Outpatient Medications:     allopurinol (ZYLOPRIM) 100 MG tablet, TAKE 1 TABLET BY MOUTH TWICE  DAILY, Disp: 60 tablet, Rfl: 0    amLODIPine (NORVASC) 5 MG tablet, Take 1 tablet by mouth Daily., Disp: 90 tablet, Rfl: 3    aspirin 81 MG EC tablet, Take 1 tablet by mouth Daily., Disp: 90 tablet, Rfl: 3    chlorthalidone (HYGROTON) 25 MG tablet, Take 1 tablet by mouth Daily., Disp: 90 tablet, Rfl: 3    finasteride (PROSCAR) 5 MG tablet, Take 1 tablet by mouth Daily., Disp: , Rfl:     hydrALAZINE (APRESOLINE) 50 MG tablet, Take 1 tablet by mouth 2 (Two) Times a Day., Disp: 180 tablet, Rfl: 3    Inclisiran Sodium (LEQVIO SC), Inject  under the skin into the appropriate area as directed Every 3 (Three) Months., Disp: , Rfl:     levothyroxine (SYNTHROID, LEVOTHROID) 88 MCG tablet, TAKE 1 TABLET BY MOUTH DAILY, Disp: 30 tablet, Rfl: 0    omeprazole (priLOSEC) 40 MG capsule, , Disp: , Rfl:     rosuvastatin (CRESTOR) 10 MG tablet, Take 1 tablet by mouth Daily., Disp: 90 tablet, Rfl: 3    tamsulosin (FLOMAX) 0.4 MG capsule 24 hr capsule, Take 1 capsule by mouth Daily. 1/2 HOUR FOLLOWING THE SAME MEAL EACH DAY, Disp: , Rfl:     neomycin-polymyxin-dexamethasone (MAXITROL) 3.5-90049-8.1 ophthalmic suspension, Administer 2 drops into the left eye Every 4 (Four) Hours., Disp: 5 mL, Rfl: 0    predniSONE (DELTASONE) 20 MG tablet, Take 2 tablets by mouth Daily., Disp: 8 tablet, Rfl: 0    No Known Allergies    Objective     Vitals:    03/17/25 0902   BP: 136/70   BP Location: Right arm   Patient Position: Sitting   Cuff Size: Adult   Pulse: 71   SpO2: 98%   Weight: 90.9 kg (200 lb 6.4 oz)   Height: 180.3 cm (71\")     Body mass index is 27.95 kg/m².    Constitutional:       Appearance: " "Normal and healthy appearance. Not in distress.   HENT:      Head: Normocephalic and atraumatic.   Pulmonary:      Effort: Pulmonary effort is normal.      Breath sounds: Normal breath sounds.   Cardiovascular:      PMI at left midclavicular line. Normal rate. Regular rhythm. Normal S1. Normal S2.       Murmurs: There is no murmur.      No gallop.  No click. No rub.   Pulses:     Intact distal pulses.   Edema:     Peripheral edema absent.   Abdominal:      General: Bowel sounds are normal.      Palpations: Abdomen is soft.   Skin:     General: Skin is warm and dry.   Neurological:      Mental Status: Alert and oriented to person, place and time.   Psychiatric:         Behavior: Behavior is cooperative.         Lab Results   Component Value Date    GLUCOSE 112 (H) 11/20/2024    CALCIUM 9.8 11/20/2024     11/20/2024    K 3.6 11/20/2024    CO2 24 11/20/2024     11/20/2024    BUN 17 11/20/2024    CREATININE 1.13 11/20/2024    EGFRIFAFRI 55 02/25/2025    BCR 15 11/20/2024    ANIONGAP 11.0 12/06/2023     Lab Results   Component Value Date    WBC 10.1 11/20/2024    HGB 14.1 11/20/2024    HCT 42.5 11/20/2024    MCV 96 11/20/2024     11/20/2024     No results found for: \"INR\", \"PROTIME\"  Lab Results   Component Value Date    TSH 1.310 07/09/2024       Cardiac Testing:     I personally viewed and interpreted the patient's EKG/Telemetry/lab data.    Procedures    Tobacco Cessation: N/A  Obstructive Sleep Apnea Screening: N/A    Advance Care Planning   ACP discussion was held with the patient during this visit. Patient has an advance directive (not in EMR), copy requested.       Assessment & Plan      Assessment & Plan  AF (paroxysmal atrial fibrillation)  S/p AV nina ablation with BSC PPM implantation        Presence of Watchman left atrial appendage closure device  Maintained on ASA 81mg PO every other day.  He states that he can not take ASA daily due to bleeding       Primary hypertension  Hypertension " is stable and controlled  Continue current treatment regimen.  Blood pressure will be reassessed in 1 year.  Today's /70  He states this is where his BP typically runs  He denies HA or stroke like symptoms             Follow Up:   Return in about 1 year (around 3/17/2026).    Thank you for allowing me to participate in the care of your patient. Please to not hesitate to contact me with additional questions or concerns.      TEODORA Palacios  [facility] / [facility]

## 2025-03-17 NOTE — ASSESSMENT & PLAN NOTE
Hypertension is stable and controlled  Continue current treatment regimen.  Blood pressure will be reassessed in 1 year.  Today's /70  He states this is where his BP typically runs  He denies HA or stroke like symptoms

## 2025-03-17 NOTE — ASSESSMENT & PLAN NOTE
Maintained on ASA 81mg PO every other day.  He states that he can not take ASA daily due to bleeding

## 2025-03-27 RX ORDER — ALLOPURINOL 100 MG/1
100 TABLET ORAL 2 TIMES DAILY
Qty: 60 TABLET | Refills: 0 | Status: SHIPPED | OUTPATIENT
Start: 2025-03-27

## 2025-03-27 RX ORDER — LEVOTHYROXINE SODIUM 88 UG/1
88 TABLET ORAL DAILY
Qty: 30 TABLET | Refills: 0 | Status: SHIPPED | OUTPATIENT
Start: 2025-03-27

## 2025-04-02 PROCEDURE — 93296 REM INTERROG EVL PM/IDS: CPT | Performed by: INTERNAL MEDICINE

## 2025-04-02 PROCEDURE — 93294 REM INTERROG EVL PM/LDLS PM: CPT | Performed by: INTERNAL MEDICINE

## 2025-04-16 RX ORDER — ALLOPURINOL 100 MG/1
100 TABLET ORAL 2 TIMES DAILY
Qty: 60 TABLET | Refills: 11 | OUTPATIENT
Start: 2025-04-16

## 2025-04-16 RX ORDER — LEVOTHYROXINE SODIUM 88 UG/1
88 TABLET ORAL DAILY
Qty: 30 TABLET | Refills: 11 | OUTPATIENT
Start: 2025-04-16

## 2025-05-09 ENCOUNTER — OFFICE VISIT (OUTPATIENT)
Dept: CARDIOLOGY | Facility: CLINIC | Age: 85
End: 2025-05-09
Payer: MEDICARE

## 2025-05-09 VITALS
BODY MASS INDEX: 28.84 KG/M2 | OXYGEN SATURATION: 96 % | RESPIRATION RATE: 18 BRPM | WEIGHT: 206 LBS | SYSTOLIC BLOOD PRESSURE: 128 MMHG | DIASTOLIC BLOOD PRESSURE: 70 MMHG | HEIGHT: 71 IN | HEART RATE: 83 BPM

## 2025-05-09 DIAGNOSIS — Z95.818 PRESENCE OF WATCHMAN LEFT ATRIAL APPENDAGE CLOSURE DEVICE: ICD-10-CM

## 2025-05-09 DIAGNOSIS — I10 PRIMARY HYPERTENSION: ICD-10-CM

## 2025-05-09 DIAGNOSIS — I20.0 UNSTABLE ANGINA: ICD-10-CM

## 2025-05-09 DIAGNOSIS — Z95.0 PRESENCE OF CARDIAC PACEMAKER: ICD-10-CM

## 2025-05-09 DIAGNOSIS — I48.20 ATRIAL FIBRILLATION, CHRONIC: Primary | ICD-10-CM

## 2025-05-09 DIAGNOSIS — E78.2 MIXED HYPERLIPIDEMIA: ICD-10-CM

## 2025-05-09 DIAGNOSIS — I25.10 CORONARY ARTERY DISEASE INVOLVING NATIVE CORONARY ARTERY OF NATIVE HEART WITHOUT ANGINA PECTORIS: ICD-10-CM

## 2025-05-09 PROBLEM — R52 PAIN IN PACEMAKER POCKET: Status: RESOLVED | Noted: 2024-07-09 | Resolved: 2025-05-09

## 2025-05-09 RX ORDER — CHLORTHALIDONE 25 MG/1
25 TABLET ORAL DAILY
Qty: 90 TABLET | Refills: 3 | Status: SHIPPED | OUTPATIENT
Start: 2025-05-09

## 2025-05-09 RX ORDER — ROSUVASTATIN CALCIUM 10 MG/1
10 TABLET, COATED ORAL DAILY
Qty: 90 TABLET | Refills: 3 | Status: SHIPPED | OUTPATIENT
Start: 2025-05-09

## 2025-05-09 RX ORDER — HYDRALAZINE HYDROCHLORIDE 50 MG/1
50 TABLET, FILM COATED ORAL 2 TIMES DAILY
Qty: 180 TABLET | Refills: 3 | Status: SHIPPED | OUTPATIENT
Start: 2025-05-09

## 2025-05-09 RX ORDER — AMLODIPINE BESYLATE 5 MG/1
5 TABLET ORAL DAILY
Qty: 90 TABLET | Refills: 3 | Status: SHIPPED | OUTPATIENT
Start: 2025-05-09

## 2025-05-09 RX ORDER — ASPIRIN 81 MG/1
81 TABLET ORAL DAILY
Qty: 90 TABLET | Refills: 3 | Status: SHIPPED | OUTPATIENT
Start: 2025-05-09

## 2025-05-09 NOTE — PROGRESS NOTES
[facility] [place]  [facility] CARDIOLOGY  [address] [place] [address]  Dept: [phone]  Dept Fax: [phone]    Reg Guerra  1940    Follow Up Office Visit Note    History of Present Illness:  Reg Guerra is a 84 y.o. male who presents to the clinic for CAD- s/p CABG in 2005 denies any CP, SOB, he did have a stress nuclear in 2021 with inferolateral ischemic but no CP< EF is normal, on ASA .,  he is s/p pacemaker  and AF s/p av node ablation and pacemaker placement he is also s.p watchman device, he is just  on ASA    The following portions of the patient's history were reviewed and updated as appropriate: allergies, current medications, past family history, past medical history, past social history, past surgical history, and problem list.    Medications:  allopurinol  amLODIPine  aspirin  chlorthalidone  finasteride  hydrALAZINE  LEQVIO SC  levothyroxine  omeprazole  rosuvastatin  tamsulosin capsule    Subjective  No Known Allergies     Past Medical History:   Diagnosis Date   • Acquired hypothyroidism    • Allergic rhinitis due to pollen    • Appendicitis    • Atherosclerotic heart disease of native coronary artery with other forms of angina pectoris    • Benign prostate hyperplasia    • Bilateral cataracts    • BMI 28.0-28.9,adult    • Chronic atrial fibrillation    • Chronic gout, unspecified, without tophus (tophi)    • Chronic gouty arthritis    • Chronic low back pain    • Diabetes    • DJD (degenerative joint disease)    • Dyslipidemia    • Essential hypertension    • Gastroesophageal reflux disease without esophagitis    • Hiatal hernia    • History of rheumatic fever as a child    • Hyperlipidemia    • Knee injury    • Mixed hyperlipidemia    • Obesity    • Peptic ulcer disease    • Presence of cardiac pacemaker        Past Surgical History:   Procedure Laterality Date   • APPENDECTOMY  1957   • ATRIAL APPENDAGE EXCLUSION LEFT WITH TRANSESOPHAGEAL  "ECHOCARDIOGRAM N/A 12/13/2023    Procedure: Atrial Appendage Occlusion;  Surgeon: Vicente Rodriguez DO;  Location: [facility] INVASIVE LOCATION;  Service: Cardiology;  Laterality: N/A;   • BYPASS GRAFT     • CARDIAC PACEMAKER PLACEMENT      VVI   • COLONOSCOPY     • KIDNEY STONE SURGERY      LITHOTRIPSY   • KNEE SURGERY  1960   • MANDIBLE FRACTURE SURGERY  2009    KICKED BY HORSE   • NISSEN FUNDOPLICATION     • TURP / TRANSURETHRAL INCISION / DRAINAGE PROSTATE     • VENTRAL HERNIA REPAIR         Family History   Problem Relation Age of Onset   • Pancreatic cancer Father    • Heart attack Sister    • Diabetes Sister    • Hypertension Sister         Social History     Socioeconomic History   • Marital status:    • Number of children: 2   • Highest education level: 12th grade   Tobacco Use   • Smoking status: Never     Passive exposure: Never   • Smokeless tobacco: Never   Vaping Use   • Vaping status: Never Used   Substance and Sexual Activity   • Alcohol use: Never   • Drug use: Never   • Sexual activity: Defer       Review of Systems   Constitutional: Negative.    HENT: Negative.     Respiratory: Negative.     Cardiovascular: Negative.    Endocrine: Negative.    Genitourinary: Negative.    Musculoskeletal: Negative.    Skin: Negative.    Allergic/Immunologic: Negative.    Neurological: Negative.    Hematological: Negative.    Psychiatric/Behavioral: Negative.       Cardiovascular Procedures    ECHO/MUGA:  STRESS TESTS:   CARDIAC CATH:   DEVICES:   HOLTER:   CT/MRI:   VASCULAR:   CARDIOTHORACIC:     Objective  Vitals:    05/09/25 0916   BP: 128/70   Pulse: 83   Resp: 18   SpO2: 96%   Weight: 93.4 kg (206 lb)   Height: 180.3 cm (71\")   PainSc: 0-No pain     Body mass index is 28.73 kg/m².     Physical Exam  Vitals reviewed.   Constitutional:       Appearance: Healthy appearance. Not in distress.   Eyes:      Pupils: Pupils are equal, round, and reactive to light.   HENT:    Mouth/Throat:      Pharynx: Oropharynx " is clear.   Neck:      Thyroid: Thyroid normal.      Vascular: No JVR. JVD normal.   Pulmonary:      Effort: Pulmonary effort is normal.      Breath sounds: Normal breath sounds. No wheezing. No rhonchi. No rales.   Chest:      Chest wall: Not tender to palpatation.   Cardiovascular:      PMI at left midclavicular line. Normal rate. Regular rhythm. Normal S1. Normal S2.       Murmurs: There is no murmur.      No gallop.  No click. No rub.   Pulses:     Intact distal pulses.      Carotid: 3+ bilaterally.     Radial: 3+ bilaterally.     Femoral: 3+ bilaterally.     Dorsalis pedis: 3+ bilaterally.     Posterior tibial: 3+ bilaterally.  Edema:     Peripheral edema absent.   Abdominal:      General: Bowel sounds are normal.      Palpations: Abdomen is soft.      Tenderness: There is no abdominal tenderness.   Musculoskeletal: Normal range of motion.         General: No tenderness.      Cervical back: Normal range of motion and neck supple. Skin:     General: Skin is warm and dry.   Neurological:      General: No focal deficit present.      Mental Status: Alert and oriented to person, place and time.        Diagnostic Data    ECG 12 Lead    Date/Time: 5/10/2025 7:40 AM  Performed by: Fredy Yee MD    Authorized by: Fredy Yee MD  Comparison: compared with previous ECG from 11/20/2024  Similar to previous ECG  Rhythm: atrial fibrillation and paced  Rate: normal  BPM: 70  QRS axis: normal  Other findings: non-specific ST-T wave changes    Clinical impression: abnormal EKG        Assessment and Plan  Diagnoses and all orders for this visit:    Atrial fibrillation, chronic- rate control, s/p Av node avlation and pacer  on ASA/ s/p Watchamn device    Coronary artery disease involving native coronary artery of native heart without angina pectoris- No chest pain s/p CABG in 2005 normal EF  -     CBC & Differential  -     Comprehensive Metabolic Panel  -     CK    Mixed hyperlipidemia- On Crestor and  Leqvio tolerates well, will get a lipid profile  -     CK  -     High Sensitivity CRP  -     Lipid Panel  -     Lipoprotein A (LPA)  -     proBNP  -     TSH    Presence of cardiac pacemaker- This seems working fine,    Presence of Watchman left atrial appendage closure device    Primary hypertension- BP is 115.60 on Chlorthalidone 25 mg, Amlodipine 5 mg and Hydralazine 50 mg bid  -     chlorthalidone (HYGROTON) 25 MG tablet; Take 1 tablet by mouth Daily.  -     hydrALAZINE (APRESOLINE) 50 MG tablet; Take 1 tablet by mouth 2 (Two) Times a Day.    Unstable angina  -     proBNP    Other orders  -     amLODIPine (NORVASC) 5 MG tablet; Take 1 tablet by mouth Daily.  -     aspirin 81 MG EC tablet; Take 1 tablet by mouth Daily.  -     rosuvastatin (CRESTOR) 10 MG tablet; Take 1 tablet by mouth Daily.         Return in about 6 months (around 11/9/2025) for Recheck with Dr. Yee.    Fredy Yee MD  05/09/2025

## 2025-05-12 ENCOUNTER — RESULTS FOLLOW-UP (OUTPATIENT)
Dept: CARDIOLOGY | Facility: CLINIC | Age: 85
End: 2025-05-12
Payer: MEDICARE

## 2025-05-12 LAB
ALBUMIN SERPL-MCNC: 4.5 G/DL (ref 3.7–4.7)
ALP SERPL-CCNC: 104 IU/L (ref 44–121)
ALT SERPL-CCNC: 15 IU/L (ref 0–44)
AST SERPL-CCNC: 24 IU/L (ref 0–40)
BASOPHILS # BLD AUTO: 0.1 X10E3/UL (ref 0–0.2)
BASOPHILS NFR BLD AUTO: 1 %
BILIRUB SERPL-MCNC: 0.6 MG/DL (ref 0–1.2)
BUN SERPL-MCNC: 17 MG/DL (ref 8–27)
BUN/CREAT SERPL: 17 (ref 10–24)
CALCIUM SERPL-MCNC: 9.7 MG/DL (ref 8.6–10.2)
CHLORIDE SERPL-SCNC: 97 MMOL/L (ref 96–106)
CHOLEST SERPL-MCNC: 98 MG/DL (ref 100–199)
CK SERPL-CCNC: 103 U/L (ref 30–208)
CO2 SERPL-SCNC: 23 MMOL/L (ref 20–29)
CREAT SERPL-MCNC: 1.03 MG/DL (ref 0.76–1.27)
CRP SERPL HS-MCNC: 7.73 MG/L (ref 0–3)
EGFRCR SERPLBLD CKD-EPI 2021: 72 ML/MIN/1.73
EOSINOPHIL # BLD AUTO: 0.4 X10E3/UL (ref 0–0.4)
EOSINOPHIL NFR BLD AUTO: 4 %
ERYTHROCYTE [DISTWIDTH] IN BLOOD BY AUTOMATED COUNT: 14 % (ref 11.6–15.4)
GLOBULIN SER CALC-MCNC: 2.7 G/DL (ref 1.5–4.5)
GLUCOSE SERPL-MCNC: 118 MG/DL (ref 70–99)
HCT VFR BLD AUTO: 43.8 % (ref 37.5–51)
HDLC SERPL-MCNC: 50 MG/DL
HGB BLD-MCNC: 14 G/DL (ref 13–17.7)
IMM GRANULOCYTES # BLD AUTO: 0.1 X10E3/UL (ref 0–0.1)
IMM GRANULOCYTES NFR BLD AUTO: 1 %
LDLC SERPL CALC-MCNC: 30 MG/DL (ref 0–99)
LPA SERPL-SCNC: 160.1 NMOL/L
LYMPHOCYTES # BLD AUTO: 1.4 X10E3/UL (ref 0.7–3.1)
LYMPHOCYTES NFR BLD AUTO: 15 %
MCH RBC QN AUTO: 30.6 PG (ref 26.6–33)
MCHC RBC AUTO-ENTMCNC: 32 G/DL (ref 31.5–35.7)
MCV RBC AUTO: 96 FL (ref 79–97)
MONOCYTES # BLD AUTO: 0.8 X10E3/UL (ref 0.1–0.9)
MONOCYTES NFR BLD AUTO: 9 %
NEUTROPHILS # BLD AUTO: 6.6 X10E3/UL (ref 1.4–7)
NEUTROPHILS NFR BLD AUTO: 70 %
NT-PROBNP SERPL-MCNC: 668 PG/ML (ref 0–486)
PLATELET # BLD AUTO: 281 X10E3/UL (ref 150–450)
POTASSIUM SERPL-SCNC: 3.7 MMOL/L (ref 3.5–5.2)
PROT SERPL-MCNC: 7.2 G/DL (ref 6–8.5)
RBC # BLD AUTO: 4.57 X10E6/UL (ref 4.14–5.8)
SODIUM SERPL-SCNC: 135 MMOL/L (ref 134–144)
TRIGL SERPL-MCNC: 90 MG/DL (ref 0–149)
TSH SERPL DL<=0.005 MIU/L-ACNC: 1.72 UIU/ML (ref 0.45–4.5)
VLDLC SERPL CALC-MCNC: 18 MG/DL (ref 5–40)
WBC # BLD AUTO: 9.2 X10E3/UL (ref 3.4–10.8)

## 2025-05-13 ENCOUNTER — OFFICE VISIT (OUTPATIENT)
Dept: FAMILY MEDICINE CLINIC | Facility: CLINIC | Age: 85
End: 2025-05-13
Payer: MEDICARE

## 2025-05-13 VITALS
OXYGEN SATURATION: 94 % | BODY MASS INDEX: 27.88 KG/M2 | HEIGHT: 71 IN | HEART RATE: 77 BPM | RESPIRATION RATE: 18 BRPM | DIASTOLIC BLOOD PRESSURE: 76 MMHG | SYSTOLIC BLOOD PRESSURE: 124 MMHG | WEIGHT: 199.13 LBS

## 2025-05-13 DIAGNOSIS — M1A.9XX0 CHRONIC GOUT WITHOUT TOPHUS, UNSPECIFIED CAUSE, UNSPECIFIED SITE: ICD-10-CM

## 2025-05-13 DIAGNOSIS — E03.9 PRIMARY HYPOTHYROIDISM: ICD-10-CM

## 2025-05-13 DIAGNOSIS — I77.810 AORTIC ECTASIA, THORACIC: Primary | ICD-10-CM

## 2025-05-13 RX ORDER — LEVOTHYROXINE SODIUM 88 UG/1
88 TABLET ORAL DAILY
Qty: 90 TABLET | Refills: 1 | Status: SHIPPED | OUTPATIENT
Start: 2025-05-13

## 2025-05-13 RX ORDER — ALLOPURINOL 100 MG/1
100 TABLET ORAL 2 TIMES DAILY
Qty: 180 TABLET | Refills: 1 | Status: SHIPPED | OUTPATIENT
Start: 2025-05-13

## 2025-05-13 NOTE — TELEPHONE ENCOUNTER
Patient notified of lab results, patient verbalized understanding.   Pt states he is taking repatha.

## 2025-05-13 NOTE — PROGRESS NOTES
"Chief Complaint  needs referral    Subjective          Reg Guerra presents to [facility] PR[facility]  History of Present Illness  Patient in today for ER follow-up.  He gone to [facility] for abdominal pain on 5/10/2025.  Instantly noted on his CT of abdomen and pelvis was an ectasia ascending thoracic aorta measuring up to 4.3 cm no evidence of rupture was noted.  It was recommended for him to follow-up with specialist for evaluation.  He states he has had intermittent mid abdominal and to back pain for quite some time.  Denies any fever.  Also noted on the scan was a moderate to large hiatal hernia that he states he has had for years.      Objective   Vital Signs:   /76 (BP Location: Left arm, Patient Position: Sitting, Cuff Size: Adult)   Pulse 77   Resp 18   Ht 180.3 cm (71\")   Wt 90.3 kg (199 lb 2 oz)   SpO2 94%   BMI 27.77 kg/m²     Body mass index is 27.77 kg/m².    Review of Systems   Constitutional:  Negative for fatigue and fever.   HENT:  Negative for congestion and sore throat.    Respiratory:  Negative for cough and shortness of breath.    Cardiovascular:  Negative for chest pain.   Gastrointestinal:  Negative for abdominal pain, diarrhea, nausea and vomiting.   Neurological:  Negative for dizziness and headache.       Past History:  Medical History: has a past medical history of Acquired hypothyroidism, Allergic rhinitis due to pollen, Appendicitis, Atherosclerotic heart disease of native coronary artery with other forms of angina pectoris, Benign prostate hyperplasia, Bilateral cataracts, BMI 28.0-28.9,adult, Chronic atrial fibrillation, Chronic gout, unspecified, without tophus (tophi), Chronic gouty arthritis, Chronic low back pain, Diabetes, DJD (degenerative joint disease), Dyslipidemia, Essential hypertension, Gastroesophageal reflux disease without esophagitis, Hiatal hernia, History of rheumatic fever as a child, Hyperlipidemia, " Knee injury, Mixed hyperlipidemia, Obesity, Peptic ulcer disease, and Presence of cardiac pacemaker.   Surgical History: has a past surgical history that includes Knee surgery (1960); Appendectomy (1957); Ventral hernia repair; Kidney stone surgery; Cardiac pacemaker placement; Nissen fundoplication; TURP / transurethral incision / drainage prostate; Mandible fracture surgery (2009); Bypass Graft; Colonoscopy; and Atrial Appendage Exclusion Left (N/A, 12/13/2023).   Family History: family history includes Diabetes in his sister; Heart attack in his sister; Hypertension in his sister; Pancreatic cancer in his father.   Social History: reports that he has never smoked. He has never been exposed to tobacco smoke. He has never used smokeless tobacco. He reports that he does not drink alcohol and does not use drugs.      Current Outpatient Medications:     allopurinol (ZYLOPRIM) 100 MG tablet, TAKE 1 TABLET BY MOUTH TWICE  DAILY, Disp: 60 tablet, Rfl: 0    amLODIPine (NORVASC) 5 MG tablet, Take 1 tablet by mouth Daily., Disp: 90 tablet, Rfl: 3    aspirin 81 MG EC tablet, Take 1 tablet by mouth Daily., Disp: 90 tablet, Rfl: 3    chlorthalidone (HYGROTON) 25 MG tablet, Take 1 tablet by mouth Daily., Disp: 90 tablet, Rfl: 3    finasteride (PROSCAR) 5 MG tablet, Take 1 tablet by mouth Daily., Disp: , Rfl:     hydrALAZINE (APRESOLINE) 50 MG tablet, Take 1 tablet by mouth 2 (Two) Times a Day., Disp: 180 tablet, Rfl: 3    Inclisiran Sodium (LEQVIO SC), Inject  under the skin into the appropriate area as directed Every 6 (Six) Months., Disp: , Rfl:     levothyroxine (SYNTHROID, LEVOTHROID) 88 MCG tablet, TAKE 1 TABLET BY MOUTH DAILY, Disp: 30 tablet, Rfl: 0    omeprazole (priLOSEC) 40 MG capsule, , Disp: , Rfl:     rosuvastatin (CRESTOR) 10 MG tablet, Take 1 tablet by mouth Daily., Disp: 90 tablet, Rfl: 3    tamsulosin (FLOMAX) 0.4 MG capsule 24 hr capsule, Take 1 capsule by mouth Daily. 1/2 HOUR FOLLOWING THE SAME MEAL EACH  DAY, Disp: , Rfl:   Allergies: Patient has no known allergies.    Physical Exam  Constitutional:       Appearance: Normal appearance.   Cardiovascular:      Rate and Rhythm: Normal rate and regular rhythm.      Heart sounds: Normal heart sounds.   Pulmonary:      Effort: Pulmonary effort is normal.      Breath sounds: Normal breath sounds.   Abdominal:      Palpations: Abdomen is soft.   Neurological:      Mental Status: He is alert and oriented to person, place, and time.   Psychiatric:         Mood and Affect: Mood normal.         Behavior: Behavior normal.             Assessment and Plan   Diagnoses and all orders for this visit:    1. Aortic ectasia, thoracic (Primary)  -     Ambulatory Referral to Cardiothoracic Surgery  He has requested to f/up with Dr Oliver for consult to area. Monitor for any worsening symptoms and to ER if needed.   2. Primary hypothyroidism  Refilled levothryoxine.   3. Chronic gout without tophus, unspecified cause, unspecified site  Refilled allopurinol.           Follow Up   No follow-ups on file.  Patient was given instructions and counseling regarding his condition or for health maintenance advice. Please see specific information pulled into the AVS if appropriate.     Gloria Siegel PA-C

## 2025-06-13 ENCOUNTER — OFFICE VISIT (OUTPATIENT)
Dept: FAMILY MEDICINE CLINIC | Facility: CLINIC | Age: 85
End: 2025-06-13
Payer: MEDICARE

## 2025-06-13 ENCOUNTER — RESULTS FOLLOW-UP (OUTPATIENT)
Dept: FAMILY MEDICINE CLINIC | Facility: CLINIC | Age: 85
End: 2025-06-13

## 2025-06-13 VITALS
SYSTOLIC BLOOD PRESSURE: 136 MMHG | BODY MASS INDEX: 27.94 KG/M2 | WEIGHT: 199.6 LBS | DIASTOLIC BLOOD PRESSURE: 58 MMHG | HEIGHT: 71 IN | HEART RATE: 70 BPM | OXYGEN SATURATION: 98 %

## 2025-06-13 DIAGNOSIS — M25.511 ACUTE PAIN OF RIGHT SHOULDER: ICD-10-CM

## 2025-06-13 RX ORDER — TRIAMCINOLONE ACETONIDE 40 MG/ML
60 INJECTION, SUSPENSION INTRA-ARTICULAR; INTRAMUSCULAR ONCE
Status: COMPLETED | OUTPATIENT
Start: 2025-06-13 | End: 2025-06-13

## 2025-06-13 RX ADMIN — TRIAMCINOLONE ACETONIDE 60 MG: 40 INJECTION, SUSPENSION INTRA-ARTICULAR; INTRAMUSCULAR at 14:06

## 2025-06-13 NOTE — PROGRESS NOTES
Will you let him know that his XR showed arthritis. Hopefully his pain will improve with the steroid! Thanks!

## 2025-06-13 NOTE — PROGRESS NOTES
"Chief Complaint  Shoulder Pain (C/o right shoulder pain onset 4 days. )    Subjective          Reg Guerra presents to [facility] PRIMARY [facility]  History of Present Illness  Pt has had right shoulder pain since Monday. He lifted a heavy object on Sunday prior to onset. He denies numbness/tingling or neck pain.       History of Present Illness      Objective   Vital Signs:   /58   Pulse 70   Ht 180.3 cm (71\")   Wt 90.5 kg (199 lb 9.6 oz)   SpO2 98%   BMI 27.84 kg/m²     Body mass index is 27.84 kg/m².    Review of Systems   Constitutional:  Negative for fatigue and fever.   Respiratory:  Negative for shortness of breath.    Cardiovascular:  Negative for chest pain, palpitations and leg swelling.   Musculoskeletal:  Positive for arthralgias.   Neurological:  Negative for syncope.   Psychiatric/Behavioral:  The patient is not nervous/anxious.           Current Outpatient Medications:     allopurinol (ZYLOPRIM) 100 MG tablet, Take 1 tablet by mouth 2 (Two) Times a Day., Disp: 180 tablet, Rfl: 1    amLODIPine (NORVASC) 5 MG tablet, Take 1 tablet by mouth Daily., Disp: 90 tablet, Rfl: 3    aspirin 81 MG EC tablet, Take 1 tablet by mouth Daily., Disp: 90 tablet, Rfl: 3    chlorthalidone (HYGROTON) 25 MG tablet, Take 1 tablet by mouth Daily., Disp: 90 tablet, Rfl: 3    finasteride (PROSCAR) 5 MG tablet, Take 1 tablet by mouth Daily., Disp: , Rfl:     hydrALAZINE (APRESOLINE) 50 MG tablet, Take 1 tablet by mouth 2 (Two) Times a Day., Disp: 180 tablet, Rfl: 3    Inclisiran Sodium (LEQVIO SC), Inject  under the skin into the appropriate area as directed Every 6 (Six) Months., Disp: , Rfl:     levothyroxine (SYNTHROID, LEVOTHROID) 88 MCG tablet, Take 1 tablet by mouth Daily., Disp: 90 tablet, Rfl: 1    omeprazole (priLOSEC) 40 MG capsule, , Disp: , Rfl:     rosuvastatin (CRESTOR) 10 MG tablet, Take 1 tablet by mouth Daily., Disp: 90 tablet, Rfl: 3    tamsulosin (FLOMAX) 0.4 MG capsule 24 hr " capsule, Take 1 capsule by mouth Daily. 1/2 HOUR FOLLOWING THE SAME MEAL EACH DAY, Disp: , Rfl:   No current facility-administered medications for this visit.      Allergies: Patient has no known allergies.    Physical Exam  Constitutional:       Appearance: Normal appearance.   HENT:      Head: Normocephalic.   Eyes:      Conjunctiva/sclera: Conjunctivae normal.      Pupils: Pupils are equal, round, and reactive to light.   Cardiovascular:      Rate and Rhythm: Normal rate and regular rhythm.      Heart sounds: Normal heart sounds.   Pulmonary:      Effort: Pulmonary effort is normal.      Breath sounds: Normal breath sounds.   Abdominal:      Tenderness: There is no abdominal tenderness.   Musculoskeletal:         General: Normal range of motion.      Comments: Tenderness right shoulder, decreased ROM due to pain   Skin:     General: Skin is warm and dry.      Capillary Refill: Capillary refill takes less than 2 seconds.   Neurological:      General: No focal deficit present.      Mental Status: He is alert and oriented to person, place, and time.   Psychiatric:         Mood and Affect: Mood normal.         Behavior: Behavior normal.         Thought Content: Thought content normal.         Judgment: Judgment normal.          Physical Exam         Result Review :                Results            Assessment and Plan    Diagnoses and all orders for this visit:    1. Acute pain of right shoulder  Comments:  XRs done. Kenalog given and apply ice to painful areas. ROM stretching. F/U with ortho. RTC for worsened sx and if not improving in 1 week.  Orders:  -     triamcinolone acetonide (KENALOG-40) injection 60 mg  -     XR Shoulder 2+ View Right; Future  -     Ambulatory Referral to Orthopedic Surgery                  Follow Up   Return in about 1 week (around 6/20/2025) for if not improving or sooner if symptoms worsen.  Patient was given instructions and counseling regarding his condition or for health maintenance  advice. Please see specific information pulled into the AVS if appropriate.     TEODORA Michaels

## 2025-06-13 NOTE — TELEPHONE ENCOUNTER
GINNY on VM to call back.    HUB to relay message from Crystal.    Will you let him know that his XR showed arthritis. Hopefully his pain will improve with the steroid! Thanks!

## 2025-06-24 ENCOUNTER — OFFICE VISIT (OUTPATIENT)
Dept: ORTHOPEDIC SURGERY | Facility: CLINIC | Age: 85
End: 2025-06-24
Payer: MEDICARE

## 2025-06-24 VITALS
DIASTOLIC BLOOD PRESSURE: 64 MMHG | SYSTOLIC BLOOD PRESSURE: 120 MMHG | BODY MASS INDEX: 27.86 KG/M2 | HEIGHT: 71 IN | WEIGHT: 199 LBS

## 2025-06-24 DIAGNOSIS — M19.011 ARTHRITIS OF RIGHT GLENOHUMERAL JOINT: Primary | ICD-10-CM

## 2025-06-24 DIAGNOSIS — M67.921 BICEPS TENDINOPATHY OF RIGHT UPPER EXTREMITY: ICD-10-CM

## 2025-06-24 PROCEDURE — 3078F DIAST BP <80 MM HG: CPT | Performed by: STUDENT IN AN ORGANIZED HEALTH CARE EDUCATION/TRAINING PROGRAM

## 2025-06-24 PROCEDURE — 20611 DRAIN/INJ JOINT/BURSA W/US: CPT | Performed by: STUDENT IN AN ORGANIZED HEALTH CARE EDUCATION/TRAINING PROGRAM

## 2025-06-24 PROCEDURE — 3074F SYST BP LT 130 MM HG: CPT | Performed by: STUDENT IN AN ORGANIZED HEALTH CARE EDUCATION/TRAINING PROGRAM

## 2025-06-24 PROCEDURE — 1160F RVW MEDS BY RX/DR IN RCRD: CPT | Performed by: STUDENT IN AN ORGANIZED HEALTH CARE EDUCATION/TRAINING PROGRAM

## 2025-06-24 PROCEDURE — 99214 OFFICE O/P EST MOD 30 MIN: CPT | Performed by: STUDENT IN AN ORGANIZED HEALTH CARE EDUCATION/TRAINING PROGRAM

## 2025-06-24 PROCEDURE — 1159F MED LIST DOCD IN RCRD: CPT | Performed by: STUDENT IN AN ORGANIZED HEALTH CARE EDUCATION/TRAINING PROGRAM

## 2025-06-24 RX ORDER — LIDOCAINE HYDROCHLORIDE 10 MG/ML
2 INJECTION, SOLUTION EPIDURAL; INFILTRATION; INTRACAUDAL; PERINEURAL
Status: COMPLETED | OUTPATIENT
Start: 2025-06-24 | End: 2025-06-24

## 2025-06-24 RX ORDER — BUPIVACAINE HYDROCHLORIDE 2.5 MG/ML
2 INJECTION, SOLUTION EPIDURAL; INFILTRATION; INTRACAUDAL; PERINEURAL
Status: COMPLETED | OUTPATIENT
Start: 2025-06-24 | End: 2025-06-24

## 2025-06-24 RX ORDER — TRIAMCINOLONE ACETONIDE 40 MG/ML
80 INJECTION, SUSPENSION INTRA-ARTICULAR; INTRAMUSCULAR
Status: COMPLETED | OUTPATIENT
Start: 2025-06-24 | End: 2025-06-24

## 2025-06-24 RX ADMIN — BUPIVACAINE HYDROCHLORIDE 2 ML: 2.5 INJECTION, SOLUTION EPIDURAL; INFILTRATION; INTRACAUDAL; PERINEURAL at 11:46

## 2025-06-24 RX ADMIN — TRIAMCINOLONE ACETONIDE 80 MG: 40 INJECTION, SUSPENSION INTRA-ARTICULAR; INTRAMUSCULAR at 11:46

## 2025-06-24 RX ADMIN — LIDOCAINE HYDROCHLORIDE 2 ML: 10 INJECTION, SOLUTION EPIDURAL; INFILTRATION; INTRACAUDAL; PERINEURAL at 11:46

## 2025-06-24 NOTE — PROGRESS NOTES
[facility] [place] Orthopaedic Surgery Office Visit     Office Visit       Date: 06/24/2025   Patient Name: Reg Guerra  MRN: [id number]  YOB: 1940    Referring Physician: Dahlia Rooney APRN     Chief Complaint:   Chief Complaint   Patient presents with    Right Shoulder - Pain     History of Present Illness:   Reg Guerra is a 85 y.o. male who presents with new problem of: right shoulder pain.  Onset: atraumatic and gradual in nature. The issue has been ongoing for several year(s). Pain is a 4/10 on the pain scale. Pain is described as throbbing. Associated symptoms include pain. The pain is worse with any movement of the joint; ice and heat improve the pain. Previous treatments have included: nothing.    Subjective   Review of Systems: Review of Systems   Constitutional:  Negative for chills, fever, unexpected weight gain and unexpected weight loss.   HENT:  Negative for congestion, postnasal drip and rhinorrhea.    Eyes:  Negative for blurred vision.   Respiratory:  Negative for shortness of breath.    Cardiovascular:  Negative for leg swelling.   Gastrointestinal:  Negative for abdominal pain, nausea and vomiting.   Genitourinary:  Negative for difficulty urinating.   Musculoskeletal:  Positive for arthralgias. Negative for gait problem, joint swelling and myalgias.   Skin:  Negative for skin lesions and wound.   Neurological:  Negative for dizziness, weakness, light-headedness and numbness.   Hematological:  Does not bruise/bleed easily.   Psychiatric/Behavioral:  Negative for depressed mood.         I have reviewed the following portions of the patient's history:History of Present Illness and review of systems.    Past Medical History:   Past Medical History:   Diagnosis Date    Acquired hypothyroidism     Allergic rhinitis due to pollen     Appendicitis     Atherosclerotic heart disease of native coronary artery with other forms of angina pectoris      Benign prostate hyperplasia     Bilateral cataracts     BMI 28.0-28.9,adult     Chronic atrial fibrillation     Chronic gout, unspecified, without tophus (tophi)     Chronic gouty arthritis     Chronic low back pain     Diabetes     DJD (degenerative joint disease)     Dyslipidemia     Essential hypertension     Gastroesophageal reflux disease without esophagitis     Hiatal hernia     History of rheumatic fever as a child     Hyperlipidemia     Knee injury     Mixed hyperlipidemia     Obesity     Peptic ulcer disease     Presence of cardiac pacemaker        Past Surgical History:   Past Surgical History:   Procedure Laterality Date    APPENDECTOMY  1957    ATRIAL APPENDAGE EXCLUSION LEFT WITH TRANSESOPHAGEAL ECHOCARDIOGRAM N/A 12/13/2023    Procedure: Atrial Appendage Occlusion;  Surgeon: Vicente Rodriguez DO;  Location: [facility] INVASIVE LOCATION;  Service: Cardiology;  Laterality: N/A;    BYPASS GRAFT      CARDIAC PACEMAKER PLACEMENT      VVI    COLONOSCOPY      KIDNEY STONE SURGERY      LITHOTRIPSY    KNEE SURGERY  1960    MANDIBLE FRACTURE SURGERY  2009    KICKED BY HORSE    NISSEN FUNDOPLICATION      TURP / TRANSURETHRAL INCISION / DRAINAGE PROSTATE      VENTRAL HERNIA REPAIR         Family History:   Family History   Problem Relation Age of Onset    Pancreatic cancer Father     Heart attack Sister     Diabetes Sister     Hypertension Sister        Social History:   Social History     Socioeconomic History    Marital status:     Number of children: 2    Highest education level: 12th grade   Tobacco Use    Smoking status: Never     Passive exposure: Never    Smokeless tobacco: Never   Vaping Use    Vaping status: Never Used   Substance and Sexual Activity    Alcohol use: Never    Drug use: Never    Sexual activity: Defer       Medications:   Current Outpatient Medications:     allopurinol (ZYLOPRIM) 100 MG tablet, Take 1 tablet by mouth 2 (Two) Times a Day., Disp: 180 tablet, Rfl: 1    amLODIPine  "(NORVASC) 5 MG tablet, Take 1 tablet by mouth Daily., Disp: 90 tablet, Rfl: 3    aspirin 81 MG EC tablet, Take 1 tablet by mouth Daily., Disp: 90 tablet, Rfl: 3    chlorthalidone (HYGROTON) 25 MG tablet, Take 1 tablet by mouth Daily., Disp: 90 tablet, Rfl: 3    finasteride (PROSCAR) 5 MG tablet, Take 1 tablet by mouth Daily., Disp: , Rfl:     hydrALAZINE (APRESOLINE) 50 MG tablet, Take 1 tablet by mouth 2 (Two) Times a Day., Disp: 180 tablet, Rfl: 3    Inclisiran Sodium (LEQVIO SC), Inject  under the skin into the appropriate area as directed Every 6 (Six) Months., Disp: , Rfl:     levothyroxine (SYNTHROID, LEVOTHROID) 88 MCG tablet, Take 1 tablet by mouth Daily., Disp: 90 tablet, Rfl: 1    omeprazole (priLOSEC) 40 MG capsule, , Disp: , Rfl:     rosuvastatin (CRESTOR) 10 MG tablet, Take 1 tablet by mouth Daily., Disp: 90 tablet, Rfl: 3    tamsulosin (FLOMAX) 0.4 MG capsule 24 hr capsule, Take 1 capsule by mouth Daily. 1/2 HOUR FOLLOWING THE SAME MEAL EACH DAY, Disp: , Rfl:     Allergies: No Known Allergies    I reviewed the patient's chief complaint, history of present illness, review of systems, past medical history, surgical history, family history, social history, medications and allergy list.     Objective    Vital Signs:   Vitals:    06/24/25 1114   BP: 120/64   Weight: 90.3 kg (199 lb)   Height: 180.3 cm (71\")     Body mass index is 27.75 kg/m².   BMI is >= 25 and <30. (Overweight) The following options were offered after discussion;: exercise counseling/recommendations and nutrition counseling/recommendations      Patient reports that he is a non-smoker and has not ever been a smoker.  This behavior was applauded and he was encouraged to continue in smoking cessation.  We will continue to monitor at subsequent visits.    Ortho Exam:  Cardiovascular System: Arterial Pulses Right: radial normal. Arterial Pulses Left: radial normal.   C-Spine/Neck: Active Range of Motion: no crepitus or pain elicited on motion " and flexion normal, extension normal, rotation normal, and lateral flexion normal.   Shoulders: Inspection Right: no misalignment, erythema, induration, swelling, or warmth and AC prominence normal. Inspection Left: no misalignment, erythema, induration, swelling, or warmth and AC prominence normal. Bony Palpation Right: tenderness of the acromioclavicular joint, the greater tuberosity, and the bicipital groove and no tenderness of the clavicle. Soft Tissue Palpation Right: tenderness of the supraspinatus, the infraspinatus, the glenohumeral joint region, and the lateral cuff insertion. Active Range of Motion Right: limited. Special Tests Right: Hawkin's test positive and empty can sign positive.   exam RIGHT shoulder: forward flexion approximately 100 degrees. Abduction 100 degrees. Internal rotation SI. Motor testing supraspinatus 4/5  exam LEFT shoulder: forward flexion approximately 140 degrees. Abduction 140 degrees. Internal rotation L1. Motor testing supraspinatus 5/5    Results Review:   Imaging Results (Last 24 Hours)       ** No results found for the last 24 hours. **        I personally reviewed and interpreted radiographs of the right shoulder from 6/13/2025.  No acute fracture or dislocation.  Degenerative changes noted particular to the glenohumeral joint with inferior humeral head osteophyte.    Procedures    Assessment / Plan    Assessment/Plan:   Diagnoses and all orders for this visit:    1. Arthritis of right glenohumeral joint (Primary)    2. Biceps tendinopathy of right upper extremity    Other orders  -     - Large Joint Arthrocentesis: R glenohumeral    Plan:  Pain to the right anterior shoulder worsened by range of motion.  Also has pain to the proximal biceps tendon.  Radiographs show degenerative changes the glenohumeral joint.  Did well with intramuscular corticosteroid injection.  Will today inject the glenohumeral joint with corticosteroid under ultrasound guidance in the  office.  Activities as tolerated, work on range of motion as discussed.  Follow-up as needed.    Previous studies reviewed: Radiographs right shoulder 6/13/2025.  TSH 5/9/2025.  CMP 5/9/2025.    Follow Up:   Return if symptoms worsen or fail to improve.      Robbin Dickson MD  [facility] [place] [facility]

## 2025-06-24 NOTE — PROGRESS NOTES
Procedure   - Large Joint Arthrocentesis: R glenohumeral on 6/24/2025 11:46 AM  Indications: pain  Details: 21 G needle, ultrasound-guided posterior approach  Medications: 2 mL bupivacaine (PF) 0.25 %; 2 mL lidocaine PF 1% 1 %; 80 mg triamcinolone acetonide 40 MG/ML  Outcome: tolerated well, no immediate complications  Procedure, treatment alternatives, risks and benefits explained, specific risks discussed. Consent was given by the patient. Immediately prior to procedure a time out was called to verify the correct patient, procedure, equipment, support staff and site/side marked as required. Patient was prepped and draped in the usual sterile fashion.

## 2025-07-02 LAB
MC_CV_MDC_IDC_RATE_1: 160
MC_CV_MDC_IDC_ZONE_ID: 1
MDC_IDC_MSMT_BATTERY_REMAINING_LONGEVITY: 54 MO
MDC_IDC_MSMT_BATTERY_REMAINING_PERCENTAGE: 68 %
MDC_IDC_MSMT_BATTERY_STATUS: NORMAL
MDC_IDC_MSMT_LEADCHNL_RV_DTM: NORMAL
MDC_IDC_MSMT_LEADCHNL_RV_IMPEDANCE_VALUE: 1029
MDC_IDC_MSMT_LEADCHNL_RV_PACING_THRESHOLD_AMPLITUDE: 0.6
MDC_IDC_MSMT_LEADCHNL_RV_PACING_THRESHOLD_POLARITY: NORMAL
MDC_IDC_MSMT_LEADCHNL_RV_PACING_THRESHOLD_PULSEWIDTH: 0.4
MDC_IDC_MSMT_LEADCHNL_RV_SENSING_INTR_AMPL: 11.2
MDC_IDC_PG_IMPLANT_DTM: NORMAL
MDC_IDC_PG_MFG: NORMAL
MDC_IDC_PG_MODEL: NORMAL
MDC_IDC_PG_SERIAL: NORMAL
MDC_IDC_PG_TYPE: NORMAL
MDC_IDC_SESS_DTM: NORMAL
MDC_IDC_SESS_TYPE: NORMAL
MDC_IDC_SET_BRADY_LOWRATE: 70
MDC_IDC_SET_BRADY_MAX_SENSOR_RATE: 110
MDC_IDC_SET_BRADY_MODE: NORMAL
MDC_IDC_SET_LEADCHNL_RV_PACING_AMPLITUDE: 1.1
MDC_IDC_SET_LEADCHNL_RV_PACING_POLARITY: NORMAL
MDC_IDC_SET_LEADCHNL_RV_PACING_PULSEWIDTH: 0.4
MDC_IDC_SET_LEADCHNL_RV_SENSING_POLARITY: NORMAL
MDC_IDC_SET_LEADCHNL_RV_SENSING_SENSITIVITY: 0.3
MDC_IDC_SET_ZONE_STATUS: NORMAL
MDC_IDC_SET_ZONE_TYPE: NORMAL
MDC_IDC_STAT_BRADY_RV_PERCENT_PACED: 94

## 2025-07-18 PROBLEM — I77.819 AORTIC ECTASIA: Status: ACTIVE | Noted: 2025-07-18

## 2025-07-18 NOTE — PROGRESS NOTES
[facility] Cardiothoracic Surgery New Patient Office Note     Date of Encounter: 2025     Name: Reg Guerra  : 1940     Referred By: Gloria Siegel PA-C  PCP: Gloria Siegel PA-C    Chief Complaint:    Chief Complaint   Patient presents with    Consult     NP per Gloria Siegel PA-C for ascending aneurysm found on ER workup for right sided abdominal pain       Subjective      History of Present Illness:    Reg Guerra is a 85 y.o. male referred to Dr. Oliver for ascending aortic aneurysm. PMH: CAD s/p remote CABG ([facility], ), atrial fibrillation, complete heart block s/p ppm insertion, HTN, HLD, hypothyroidism, GERD, chronic gout, and ascending aortic ectasia. Patient had presented to the ER with c/o stomach pain and CT imaging of his chest revealed an ascending aortic aneurysm measuring 4.3 cm. he denies a family history of aneurysmal disease or personal history of connective tissue disorder. he  reports good blood pressure control. he  denies any abnormal chest, back, scapular, or abdominal pain. There is a positive smoking history, quit .       Review of Systems:  Review of Systems   Constitutional: Positive for malaise/fatigue. Negative for chills, decreased appetite, diaphoresis, fever, night sweats, weight gain and weight loss.   HENT:  Negative for hoarse voice.    Eyes:  Negative for blurred vision, double vision and visual disturbance.   Cardiovascular:  Negative for chest pain, claudication, dyspnea on exertion, irregular heartbeat, leg swelling, near-syncope, orthopnea, palpitations, paroxysmal nocturnal dyspnea and syncope.   Respiratory:  Negative for cough, hemoptysis, shortness of breath, sputum production and wheezing.    Hematologic/Lymphatic: Negative for adenopathy and bleeding problem. Bruises/bleeds easily.   Skin:  Negative for color change, nail changes, poor wound healing and rash.   Musculoskeletal:  Positive for back pain.  Negative for falls and muscle cramps.   Gastrointestinal:  Negative for abdominal pain, dysphagia and heartburn.   Genitourinary:  Negative for flank pain.   Neurological:  Negative for brief paralysis, disturbances in coordination, dizziness, focal weakness, headaches, light-headedness, loss of balance, numbness, paresthesias, sensory change, vertigo and weakness.   Psychiatric/Behavioral:  Negative for depression and suicidal ideas.    Allergic/Immunologic: Negative for persistent infections.       I have reviewed the following portions of the patient's history: problem list, current medications, allergies, past surgical history, past medical history, past social history, past family history, and ROS and confirm it's accurate.    Allergies:  No Known Allergies    Medications:      Current Outpatient Medications:     allopurinol (ZYLOPRIM) 100 MG tablet, Take 1 tablet by mouth 2 (Two) Times a Day., Disp: 180 tablet, Rfl: 1    amLODIPine (NORVASC) 5 MG tablet, Take 1 tablet by mouth Daily., Disp: 90 tablet, Rfl: 3    aspirin 81 MG EC tablet, Take 1 tablet by mouth Daily., Disp: 90 tablet, Rfl: 3    chlorthalidone (HYGROTON) 25 MG tablet, Take 1 tablet by mouth Daily., Disp: 90 tablet, Rfl: 3    finasteride (PROSCAR) 5 MG tablet, Take 1 tablet by mouth Daily., Disp: , Rfl:     hydrALAZINE (APRESOLINE) 50 MG tablet, Take 1 tablet by mouth 2 (Two) Times a Day., Disp: 180 tablet, Rfl: 3    Inclisiran Sodium (LEQVIO SC), Inject  under the skin into the appropriate area as directed Every 6 (Six) Months., Disp: , Rfl:     levothyroxine (SYNTHROID, LEVOTHROID) 88 MCG tablet, Take 1 tablet by mouth Daily., Disp: 90 tablet, Rfl: 1    rosuvastatin (CRESTOR) 10 MG tablet, Take 1 tablet by mouth Daily., Disp: 90 tablet, Rfl: 3    tamsulosin (FLOMAX) 0.4 MG capsule 24 hr capsule, Take 1 capsule by mouth Daily. 1/2 HOUR FOLLOWING THE SAME MEAL EACH DAY, Disp: , Rfl:     History:   Past Medical History:   Diagnosis Date    Acquired  hypothyroidism     Allergic rhinitis due to pollen     Appendicitis     Ascending aortic aneurysm     Atherosclerotic heart disease of native coronary artery with other forms of angina pectoris     Benign prostate hyperplasia     Bilateral cataracts     BMI 28.0-28.9,adult     Chronic atrial fibrillation     Chronic gout, unspecified, without tophus (tophi)     Chronic gouty arthritis     Chronic low back pain     DJD (degenerative joint disease)     Dyslipidemia     Essential hypertension     Gastroesophageal reflux disease without esophagitis     Hiatal hernia     History of rheumatic fever as a child     Hyperlipidemia     Kidney stones     Knee injury     Mixed hyperlipidemia     Obesity     Peptic ulcer disease     Presence of cardiac pacemaker     Skin cancer        Past Surgical History:   Procedure Laterality Date    APPENDECTOMY      ATRIAL APPENDAGE EXCLUSION LEFT WITH TRANSESOPHAGEAL ECHOCARDIOGRAM N/A 2023    Procedure: Atrial Appendage Occlusion;  Surgeon: Vicente Rodriguez DO;  Location: [facility] INVASIVE LOCATION;  Service: Cardiology;  Laterality: N/A;    BYPASS GRAFT      CARDIAC PACEMAKER PLACEMENT      VVI    CATARACT EXTRACTION Bilateral     COLONOSCOPY      CORONARY ARTERY BYPASS GRAFT      EYE SURGERY Right     laser    KIDNEY STONE SURGERY      LITHOTRIPSY    KNEE SURGERY Bilateral     MANDIBLE FRACTURE SURGERY  2009    KICKED BY HORSE    NISSEN FUNDOPLICATION      OTHER SURGICAL HISTORY      Watchman device    TURP / TRANSURETHRAL INCISION / DRAINAGE PROSTATE      VENTRAL HERNIA REPAIR         Social History     Socioeconomic History    Marital status:     Number of children: 2    Highest education level: 12th grade   Tobacco Use    Smoking status: Former     Current packs/day: 0.00     Average packs/day: 0.3 packs/day for 5.0 years (1.3 ttl pk-yrs)     Types: Cigarettes     Quit date:      Years since quittin.5     Passive exposure: Never    Smokeless tobacco:  Never   Vaping Use    Vaping status: Never Used   Substance and Sexual Activity    Alcohol use: Never    Drug use: Never    Sexual activity: Defer        Family History   Problem Relation Age of Onset    Pancreatic cancer Father     Heart attack Sister     Diabetes Sister     Hypertension Sister        Objective     Imaging/Labs:    EXTERNAL MEDICAL RECORDS - SCAN - CT/CTA A/P [facility] (05/10/2025)   CT Outside Abd/Pelvis (05/10/2025 00:00) (Personally reviewed and unable to appropriately measure TAA due to incomplete images on abdomen/pelvis CT)      CT Angiogram Chest (12/06/2023 11:02)   IMPRESSION:  1. Cardiac enlargement without pericardial effusion.     2. No anomalous pulmonary venous return.     3. Left atrial appendage well-opacified without significant thrombus burden.     4. Esophagus traverses the chest posterior to the left atrium, with large hiatal hernia noted including intrathoracic stomach with the gastric lumen interposed between the esophagus and left atrium over much of its course as above.      Electronically Signed: Shaka Back MD   12/6/2023     Adult Transesophageal Echo 3D (LA) W/ Cont If Necessary Per Protocol (12/17/2024 15:15)   Interpretation Summary    Left ventricular systolic function is low normal. Left ventricular ejection fraction appears to be 46 - 50%.    The left atrial cavity is dilated.    There is a well positioned Watchman FLX left atrial appendage occlusion device. There is a mild terrell-prosthetic leak present.    There is mild calcification of the aortic valve.    Mild mitral valve regurgitation is present  Greater Vessels No dilation of the aortic root is present. No dilation of the sinuses of Valsalva is present.     Aortic Valve The aortic valve is abnormal in structure. There is mild calcification of the aortic valve. The aortic valve appears trileaflet. Trace aortic valve regurgitation is present. No aortic valve stenosis is present.         Physical  "Exam:  Vitals:    07/21/25 1259 07/21/25 1300   BP: 142/70 150/80   BP Location: Left arm Right arm   Patient Position: Sitting Sitting   Pulse: 70    Temp: 97.5 °F (36.4 °C)    SpO2: 96%    Weight: 92.1 kg (203 lb)    Height: 180.3 cm (71\")  Comment: patient reported       Body mass index is 28.31 kg/m².       Physical Exam  Vitals and nursing note reviewed.   Constitutional:       General: He is not in acute distress.     Appearance: Normal appearance.   HENT:      Head: Normocephalic and atraumatic.   Neck:      Vascular: No carotid bruit or JVD.   Cardiovascular:      Rate and Rhythm: Normal rate and regular rhythm.      Pulses:           Radial pulses are 2+ on the right side and 2+ on the left side.        Dorsalis pedis pulses are 2+ on the right side and 2+ on the left side.        Posterior tibial pulses are 2+ on the right side and 2+ on the left side.      Heart sounds: Normal heart sounds. No murmur heard.  Pulmonary:      Effort: Pulmonary effort is normal.      Breath sounds: Normal breath sounds.   Abdominal:      General: There is no abdominal bruit.      Palpations: There is no pulsatile mass.   Musculoskeletal:      Right lower leg: No edema.      Left lower leg: No edema.   Skin:     General: Skin is warm.   Neurological:      Mental Status: He is alert.      Gait: Gait is intact.   Psychiatric:         Attention and Perception: Attention normal.         Behavior: Behavior is cooperative.         Assessment / Plan    Reg Guerra is a 85 y.o. male referred to Dr. Oliver for ascending aortic aneurysm. PMH: CAD s/p remote CABG ([facility], 20089/2009), atrial fibrillation, complete heart block s/p ppm insertion, HTN, HLD, hypothyroidism, GERD, chronic gout, and ascending aortic ectasia.     Assessment / Plan:  1. Aortic ectasia   No family history of aneurysmal disease or personal history of connective tissue disorder  Reports good BP control, discussed goal of <130/80. Elevated in " clinic today.  Positive smoking history, cessation 1985. Discussed avoidance of all tobacco products  Denies unusual chest, back, scapular, or abdominal pain  CTA abdomen revealed 4.3 cm ascending aortic aneurysm. No evidence of AAA  Personally reviewed CTA imaging.  Unable to view entire ascending aorta.  Will need to repeat scan to properly visualize entire thoracic aorta.   LA 12/2024 without any dilation of aortic root and structurally normal aortic valve  Follow up in 4 months with dedicated CTA chest imaging to document stability  Remains non interventional until 5.5 cm  Return November 2025 with CTA chest +3D reconstruction      Patient Education: Continue to avoid tobacco use. Continue to maintain strict BP control with goal <130/80 mmHg.       Follow Up:   Return in about 4 months (around 11/21/2025) for CTA chest +3D reconstruction.   Or sooner for any further concerns or worsening sign and symptoms. If unable to reach us in the office please dial 911 or go to the nearest emergency department.      TEODORA Jaquez  [facility]oth[facility]acic [facility]    Time Spent: I spent 50 minutes caring for Reg on this date of service. This time includes time spent by me in the following activities: preparing for the visit, reviewing tests, obtaining and/or reviewing a separately obtained history, performing a medically appropriate examination and/or evaluation, counseling and educating the patient/family/caregiver, ordering medications, tests, or procedures, referring and communicating with other health care professionals, documenting information in the medical record, independently interpreting results and communicating that information with the patient/family/caregiver, and care coordination.

## 2025-07-21 ENCOUNTER — OFFICE VISIT (OUTPATIENT)
Dept: CARDIAC SURGERY | Facility: CLINIC | Age: 85
End: 2025-07-21
Payer: MEDICARE

## 2025-07-21 VITALS
HEIGHT: 71 IN | OXYGEN SATURATION: 96 % | HEART RATE: 70 BPM | BODY MASS INDEX: 28.42 KG/M2 | TEMPERATURE: 97.5 F | SYSTOLIC BLOOD PRESSURE: 150 MMHG | DIASTOLIC BLOOD PRESSURE: 80 MMHG | WEIGHT: 203 LBS

## 2025-07-21 DIAGNOSIS — I77.819 AORTIC ECTASIA: Primary | ICD-10-CM

## 2025-07-25 ENCOUNTER — PATIENT ROUNDING (BHMG ONLY) (OUTPATIENT)
Dept: CARDIAC SURGERY | Facility: CLINIC | Age: 85
End: 2025-07-25
Payer: MEDICARE

## 2025-07-25 NOTE — PROGRESS NOTES
July 25, 2025    Hello, may I speak with Reg Guerra?    My name is ROSEANNA RAWLS      I am  with [facility]ACIC SURGERY  [address]  [place] [address] [phone].    Before we get started may I verify your date of birth? 1940    I am calling to officially welcome you to our practice and ask about your recent visit. Is this a good time to talk? yes    Tell me about your visit with us. What things went well?  EVERYTHING WENT FINE       We're always looking for ways to make our patients' experiences even better. Do you have recommendations on ways we may improve?  no    Overall were you satisfied with your first visit to our practice? yes       I appreciate you taking the time to speak with me today. Is there anything else I can do for you? no      Thank you, and have a great day.

## (undated) DEVICE — DOME MONITORING W BONDED STPCK BIOTRANS2

## (undated) DEVICE — CATH ULTRASND ECHO ACUNAV FOR ACUSON 8F 90CM

## (undated) DEVICE — ADULT NASAL CO2 SAMPLING WITH O2 DELIVERY CANNULA FOR CAPNOFLEX MODULE: Brand: VITAL SIGNS™

## (undated) DEVICE — Device: Brand: ELECSYS TUPTAKE ASSAY

## (undated) DEVICE — KT MANIFLD EP

## (undated) DEVICE — Device: Brand: MEDEX

## (undated) DEVICE — GW DIAG .032

## (undated) DEVICE — SYS CLS VASC/VENI VASCADE MVP 6TO12F

## (undated) DEVICE — SOL NACL 0.9PCT 1000ML

## (undated) DEVICE — LIMB HOLDER, WRIST/ANKLE: Brand: DEROYAL

## (undated) DEVICE — CONTRL CONTRST CHMBRD W/TBG72IN REUS

## (undated) DEVICE — ACCESS SHEATH WITH DILATOR: Brand: WATCHMAN FXD CURVE™ ACCESS SYSTEM

## (undated) DEVICE — LEX ELECTRO PHYSIOLOGY: Brand: MEDLINE INDUSTRIES, INC.

## (undated) DEVICE — INTRO SHEATH ENGAGE W/50 GW .038 9F12

## (undated) DEVICE — ST INF PRI SMRTSTE 20DRP 2VLV 24ML 117

## (undated) DEVICE — SET PRIMARY GRVTY 10DP MALE LL 104IN

## (undated) DEVICE — ELECTRD RETRN/GRND MEGADYNE SGL/PLT W/CORD 9FT DISP

## (undated) DEVICE — 1 X VERSACROSS CONNECT TRANSSEPTAL DILATOR (INCLUDING 1 X J-TIP MECHANICAL GUIDEWIRE); 1 X VERSACROSS RF WIRE (INCLUDING 1 X CONNECTOR CABLE (SINGLE USE)); 1 X DISPERSIVE ELECTRODE: Brand: VERSACROSS CONNECT LAAC ACCESS SOLUTION

## (undated) DEVICE — DECANT BG O JET

## (undated) DEVICE — ST EXT IV SMRTSTE 2VLV FIX M LL 6ML 41

## (undated) DEVICE — DRSNG SURESITE123 4X4.8IN

## (undated) DEVICE — ADULT, W/LG. BACK PAD, RADIOTRANSPARENT ELEMENT AND LEAD WIRE COMPATIBLE W/: Brand: DEFIBRILLATION ELECTRODES

## (undated) DEVICE — CATH DIAG EXPO .052 PIG145 6F 110CM